# Patient Record
Sex: MALE | Race: BLACK OR AFRICAN AMERICAN | NOT HISPANIC OR LATINO
[De-identification: names, ages, dates, MRNs, and addresses within clinical notes are randomized per-mention and may not be internally consistent; named-entity substitution may affect disease eponyms.]

---

## 2017-11-07 ENCOUNTER — RESULT REVIEW (OUTPATIENT)
Age: 52
End: 2017-11-07

## 2017-11-07 ENCOUNTER — OUTPATIENT (OUTPATIENT)
Dept: OUTPATIENT SERVICES | Facility: HOSPITAL | Age: 52
LOS: 1 days | Discharge: HOME | End: 2017-11-07
Payer: COMMERCIAL

## 2017-11-07 DIAGNOSIS — K92.0 HEMATEMESIS: ICD-10-CM

## 2017-11-07 PROCEDURE — 43239 EGD BIOPSY SINGLE/MULTIPLE: CPT

## 2017-11-07 PROCEDURE — 88312 SPECIAL STAINS GROUP 1: CPT | Mod: 26

## 2017-11-07 PROCEDURE — 88305 TISSUE EXAM BY PATHOLOGIST: CPT | Mod: 26

## 2017-11-09 LAB — SURGICAL PATHOLOGY FINAL REPORT - CH: SIGNIFICANT CHANGE UP

## 2018-04-24 ENCOUNTER — EMERGENCY (EMERGENCY)
Facility: HOSPITAL | Age: 53
LOS: 1 days | Discharge: HOME | End: 2018-04-24
Attending: EMERGENCY MEDICINE
Payer: COMMERCIAL

## 2018-04-24 VITALS
DIASTOLIC BLOOD PRESSURE: 80 MMHG | SYSTOLIC BLOOD PRESSURE: 133 MMHG | TEMPERATURE: 98 F | RESPIRATION RATE: 18 BRPM | OXYGEN SATURATION: 98 % | HEART RATE: 78 BPM

## 2018-04-24 VITALS
HEART RATE: 80 BPM | TEMPERATURE: 98 F | DIASTOLIC BLOOD PRESSURE: 84 MMHG | RESPIRATION RATE: 20 BRPM | SYSTOLIC BLOOD PRESSURE: 125 MMHG | OXYGEN SATURATION: 97 %

## 2018-04-24 LAB
ALBUMIN SERPL ELPH-MCNC: 4.5 G/DL — SIGNIFICANT CHANGE UP (ref 3.3–5)
ALP SERPL-CCNC: 112 U/L — SIGNIFICANT CHANGE UP (ref 40–120)
ALT FLD-CCNC: 76 U/L — HIGH (ref 10–45)
ANION GAP SERPL CALC-SCNC: 20 MMOL/L — HIGH (ref 5–17)
APTT BLD: 31.6 SEC — SIGNIFICANT CHANGE UP (ref 27.5–37.4)
AST SERPL-CCNC: 180 U/L — HIGH (ref 10–40)
BASOPHILS # BLD AUTO: 0.1 K/UL — SIGNIFICANT CHANGE UP (ref 0–0.2)
BASOPHILS NFR BLD AUTO: 1 % — SIGNIFICANT CHANGE UP (ref 0–2)
BILIRUB SERPL-MCNC: 0.8 MG/DL — SIGNIFICANT CHANGE UP (ref 0.2–1.2)
BUN SERPL-MCNC: 10 MG/DL — SIGNIFICANT CHANGE UP (ref 7–23)
CALCIUM SERPL-MCNC: 9.3 MG/DL — SIGNIFICANT CHANGE UP (ref 8.4–10.5)
CHLORIDE SERPL-SCNC: 96 MMOL/L — SIGNIFICANT CHANGE UP (ref 96–108)
CO2 SERPL-SCNC: 25 MMOL/L — SIGNIFICANT CHANGE UP (ref 22–31)
CREAT SERPL-MCNC: 0.88 MG/DL — SIGNIFICANT CHANGE UP (ref 0.5–1.3)
EOSINOPHIL # BLD AUTO: 0.1 K/UL — SIGNIFICANT CHANGE UP (ref 0–0.5)
EOSINOPHIL NFR BLD AUTO: 2 % — SIGNIFICANT CHANGE UP (ref 0–6)
GLUCOSE SERPL-MCNC: 93 MG/DL — SIGNIFICANT CHANGE UP (ref 70–99)
HCT VFR BLD CALC: 41.3 % — SIGNIFICANT CHANGE UP (ref 39–50)
HGB BLD-MCNC: 14.1 G/DL — SIGNIFICANT CHANGE UP (ref 13–17)
INR BLD: 1.05 RATIO — SIGNIFICANT CHANGE UP (ref 0.88–1.16)
LYMPHOCYTES # BLD AUTO: 1.3 K/UL — SIGNIFICANT CHANGE UP (ref 1–3.3)
LYMPHOCYTES # BLD AUTO: 41 % — SIGNIFICANT CHANGE UP (ref 13–44)
MCHC RBC-ENTMCNC: 34.1 GM/DL — SIGNIFICANT CHANGE UP (ref 32–36)
MCHC RBC-ENTMCNC: 38.1 PG — HIGH (ref 27–34)
MCV RBC AUTO: 112 FL — HIGH (ref 80–100)
MONOCYTES # BLD AUTO: 0.3 K/UL — SIGNIFICANT CHANGE UP (ref 0–0.9)
MONOCYTES NFR BLD AUTO: 9 % — SIGNIFICANT CHANGE UP (ref 2–14)
NEUTROPHILS # BLD AUTO: 1.3 K/UL — LOW (ref 1.8–7.4)
NEUTROPHILS NFR BLD AUTO: 47 % — SIGNIFICANT CHANGE UP (ref 43–77)
PLATELET # BLD AUTO: 196 K/UL — SIGNIFICANT CHANGE UP (ref 150–400)
POTASSIUM SERPL-MCNC: 5.2 MMOL/L — SIGNIFICANT CHANGE UP (ref 3.5–5.3)
POTASSIUM SERPL-SCNC: 5.2 MMOL/L — SIGNIFICANT CHANGE UP (ref 3.5–5.3)
PROT SERPL-MCNC: 8.2 G/DL — SIGNIFICANT CHANGE UP (ref 6–8.3)
PROTHROM AB SERPL-ACNC: 11.3 SEC — SIGNIFICANT CHANGE UP (ref 9.8–12.7)
RBC # BLD: 3.7 M/UL — LOW (ref 4.2–5.8)
RBC # FLD: 11.9 % — SIGNIFICANT CHANGE UP (ref 10.3–14.5)
SODIUM SERPL-SCNC: 141 MMOL/L — SIGNIFICANT CHANGE UP (ref 135–145)
TROPONIN T SERPL-MCNC: <0.01 NG/ML — SIGNIFICANT CHANGE UP (ref 0–0.06)
TROPONIN T SERPL-MCNC: <0.01 NG/ML — SIGNIFICANT CHANGE UP (ref 0–0.06)
TSH SERPL-MCNC: 24.58 UIU/ML — HIGH (ref 0.27–4.2)
WBC # BLD: 3.1 K/UL — LOW (ref 3.8–10.5)
WBC # FLD AUTO: 3.1 K/UL — LOW (ref 3.8–10.5)

## 2018-04-24 PROCEDURE — 85730 THROMBOPLASTIN TIME PARTIAL: CPT

## 2018-04-24 PROCEDURE — 84484 ASSAY OF TROPONIN QUANT: CPT

## 2018-04-24 PROCEDURE — 71046 X-RAY EXAM CHEST 2 VIEWS: CPT | Mod: 26

## 2018-04-24 PROCEDURE — 80053 COMPREHEN METABOLIC PANEL: CPT

## 2018-04-24 PROCEDURE — 99284 EMERGENCY DEPT VISIT MOD MDM: CPT | Mod: 25

## 2018-04-24 PROCEDURE — 96374 THER/PROPH/DIAG INJ IV PUSH: CPT

## 2018-04-24 PROCEDURE — 99285 EMERGENCY DEPT VISIT HI MDM: CPT | Mod: 25

## 2018-04-24 PROCEDURE — 93005 ELECTROCARDIOGRAM TRACING: CPT

## 2018-04-24 PROCEDURE — 85027 COMPLETE CBC AUTOMATED: CPT

## 2018-04-24 PROCEDURE — 71046 X-RAY EXAM CHEST 2 VIEWS: CPT

## 2018-04-24 PROCEDURE — 96375 TX/PRO/DX INJ NEW DRUG ADDON: CPT

## 2018-04-24 PROCEDURE — 85610 PROTHROMBIN TIME: CPT

## 2018-04-24 PROCEDURE — 93010 ELECTROCARDIOGRAM REPORT: CPT

## 2018-04-24 PROCEDURE — 84443 ASSAY THYROID STIM HORMONE: CPT

## 2018-04-24 RX ORDER — FAMOTIDINE 10 MG/ML
20 INJECTION INTRAVENOUS ONCE
Qty: 0 | Refills: 0 | Status: COMPLETED | OUTPATIENT
Start: 2018-04-24 | End: 2018-04-24

## 2018-04-24 RX ORDER — ONDANSETRON 8 MG/1
4 TABLET, FILM COATED ORAL ONCE
Qty: 0 | Refills: 0 | Status: COMPLETED | OUTPATIENT
Start: 2018-04-24 | End: 2018-04-24

## 2018-04-24 RX ORDER — SODIUM CHLORIDE 9 MG/ML
1000 INJECTION INTRAMUSCULAR; INTRAVENOUS; SUBCUTANEOUS ONCE
Qty: 0 | Refills: 0 | Status: COMPLETED | OUTPATIENT
Start: 2018-04-24 | End: 2018-04-24

## 2018-04-24 RX ORDER — LIDOCAINE 4 G/100G
5 CREAM TOPICAL ONCE
Qty: 0 | Refills: 0 | Status: COMPLETED | OUTPATIENT
Start: 2018-04-24 | End: 2018-04-24

## 2018-04-24 RX ORDER — SUCRALFATE 1 G
1 TABLET ORAL ONCE
Qty: 0 | Refills: 0 | Status: COMPLETED | OUTPATIENT
Start: 2018-04-24 | End: 2018-04-24

## 2018-04-24 RX ADMIN — ONDANSETRON 4 MILLIGRAM(S): 8 TABLET, FILM COATED ORAL at 05:34

## 2018-04-24 RX ADMIN — Medication 30 MILLILITER(S): at 05:34

## 2018-04-24 RX ADMIN — FAMOTIDINE 20 MILLIGRAM(S): 10 INJECTION INTRAVENOUS at 05:34

## 2018-04-24 RX ADMIN — Medication 1 GRAM(S): at 05:34

## 2018-04-24 RX ADMIN — LIDOCAINE 5 MILLILITER(S): 4 CREAM TOPICAL at 05:34

## 2018-04-24 RX ADMIN — SODIUM CHLORIDE 4000 MILLILITER(S): 9 INJECTION INTRAMUSCULAR; INTRAVENOUS; SUBCUTANEOUS at 05:35

## 2018-04-24 NOTE — ED ADULT NURSE NOTE - CHIEF COMPLAINT QUOTE
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

## 2018-04-24 NOTE — ED PROVIDER NOTE - PROGRESS NOTE DETAILS
Guera ARAIZA: Patient signed out by Dr. Castillo pending 2nd trop. 2nd troponin negative. Patient reassessed - given initial complains of vomiting/ hematemesis (intermittently for weeks on tx for ulcer, has GI doc), no melena - will try PO challenge and reassess. Labs discussed with patient. Pt was reassessed, tolerating PO, stable and ready to be discharged.

## 2018-04-24 NOTE — ED PROVIDER NOTE - PHYSICAL EXAMINATION
Gen: NAD, AOx3  Head: NCAT  HEENT: PERRL, oral mucosa moist, normal conjunctiva  Lung: CTAB, no respiratory distress  CV: rrr, no murmurs, Normal perfusion  Abd: soft, epigastric tenderness no guarding no rebound, no CVA tenderness  MSK: No edema, no visible deformities  Neuro: No focal neurologic deficits  Skin: No rash   Psych: normal affect

## 2018-04-24 NOTE — ED ADULT TRIAGE NOTE - CHIEF COMPLAINT QUOTE
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

## 2018-04-24 NOTE — ED PROVIDER NOTE - OBJECTIVE STATEMENT
Patient is 52 y M with PMH thyroid presenting with hematemesis and decreased PO intake x 3 months. States that when he eats or drinks he often vomits dark red vomitus without coffee ground appearance. Also has "belching/acid" pain in chest rad to back, had upper endoscopy 10/2018 and was h pylori positive, discomfort now interfering with sleep.  Followed by GI and heme, currently on week 1 of triple therapy for h pylori. Has lost 10-15 lbs in a few months. Fam hx of heart disease in mother, ca in father.     PMD/GI: Reginaldo  Heme: Feldhammer?  ROS: Denies fever, palpitations, chills, recent sickness, HA, vision changes, cough, SOB, abdominal pain, dysuria, hematuria, rash, new joint aches, sick contacts, and recent travel.

## 2018-04-24 NOTE — ED ADULT NURSE NOTE - OBJECTIVE STATEMENT
Received patient awake and alert x 4, presenting to the ED with hematemesis and decreased PO in stake for the past 3 months. Patient states that when he eats and drinks he often vomits dark red emesis. Patient states he also has belching/acid type pain in the chest radiating to back, verbalized this pain woke him up from sleep. Denies any fever/chills, no SOB, no cough. Breathing unlabored with no S/S of distress noted, safety and comfort measures maintained, will continue to monitor.

## 2018-04-24 NOTE — ED PROVIDER NOTE - MEDICAL DECISION MAKING DETAILS
Attending MD Castillo: 52M with h/o hypothyroidism, gastritis pw chest and back pain, belching acute on chronic. Likely symptoms to known gastritis but given age will rule out ACS with serial Elli. GI cocktail and reassess

## 2018-06-12 ENCOUNTER — EMERGENCY (EMERGENCY)
Facility: HOSPITAL | Age: 53
LOS: 1 days | Discharge: HOME | End: 2018-06-12
Attending: EMERGENCY MEDICINE
Payer: COMMERCIAL

## 2018-06-12 VITALS
HEIGHT: 71 IN | DIASTOLIC BLOOD PRESSURE: 85 MMHG | RESPIRATION RATE: 16 BRPM | SYSTOLIC BLOOD PRESSURE: 121 MMHG | WEIGHT: 184.97 LBS | HEART RATE: 85 BPM | TEMPERATURE: 98 F | OXYGEN SATURATION: 97 %

## 2018-06-12 PROCEDURE — 99284 EMERGENCY DEPT VISIT MOD MDM: CPT

## 2018-06-12 PROCEDURE — 70450 CT HEAD/BRAIN W/O DYE: CPT

## 2018-06-12 PROCEDURE — 70450 CT HEAD/BRAIN W/O DYE: CPT | Mod: 26

## 2018-06-12 RX ORDER — ACETAMINOPHEN 500 MG
650 TABLET ORAL ONCE
Qty: 0 | Refills: 0 | Status: COMPLETED | OUTPATIENT
Start: 2018-06-12 | End: 2018-06-12

## 2018-06-12 RX ADMIN — Medication 650 MILLIGRAM(S): at 14:54

## 2018-06-12 NOTE — ED PROVIDER NOTE - PLAN OF CARE
1. Follow up with your primary care doctor in the next 1-2 days  2.Take Tylenol 1 g every 6 hours alternated with Motrin 400mg every 6 hours as needed for pain  3. For persistent symptoms follow up with the concussion center call [phone] to make an appointment   4. Return to the Emergency Room if you experience new or worsening symptoms
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
## 2018-06-12 NOTE — ED PROVIDER NOTE - PHYSICAL EXAMINATION
CONSTITUTIONAL: Patient is awake, alert and oriented x 3. Patient is well appearing and in no acute distress.  HEAD: NCAT,   EYES: PERRL b/l, EOMI,   ENT: Airway patent, Nasal mucosa clear. Mouth with normal mucosa. Throat has no vesicles, no oropharyngeal exudates and uvula is midline.  NECK: supple, No LAD,  LUNGS: CTA B/L, No ttp on palpation of chest   HEART: RRR.+S1S2 no murmurs,   ABDOMEN: Soft nd/nt+bs no rebound or guarding. No seat belt sign.   EXTREMITY: no edema or calf tenderness b/l, FROM upper and lower ext b/l  SKIN: with no rash or lesions.   NEURO: Cn3-12 grossly intact. Strength5/5UE/LE.NmlSensation.Gait normal. No pronator drift.

## 2018-06-12 NOTE — ED PROVIDER NOTE - CARE PLAN
Principal Discharge DX:	Concussion without loss of consciousness, initial encounter Principal Discharge DX:	Concussion without loss of consciousness, initial encounter  Assessment and plan of treatment:	1. Follow up with your primary care doctor in the next 1-2 days  2.Take Tylenol 1 g every 6 hours alternated with Motrin 400mg every 6 hours as needed for pain  3. For persistent symptoms follow up with the concussion center call [phone] to make an appointment   4. Return to the Emergency Room if you experience new or worsening symptoms

## 2018-06-21 NOTE — ED PROVIDER NOTE - ATTENDING CONTRIBUTION TO CARE
Attending MD Castillo:  I personally have seen and examined this patient.  Resident note reviewed and agree on plan of care and except where noted.  See HPI, PE, and MDM for details.       Attending MD Castillo: A & O x 3, NAD, EOMI b/l, PERRL b/l; lungs CTAB, heart with reg rhythm without murmur; abdomen soft NTND; extremities with no edema; affect appropriate. neuro exam non focal with no motor or sensory deficits. peripheral pulses full and equal in bilateral upper and lower extremities No

## 2018-06-29 ENCOUNTER — EMERGENCY (EMERGENCY)
Facility: HOSPITAL | Age: 53
LOS: 1 days | Discharge: HOME | End: 2018-06-29
Attending: EMERGENCY MEDICINE
Payer: COMMERCIAL

## 2018-06-29 VITALS
SYSTOLIC BLOOD PRESSURE: 137 MMHG | TEMPERATURE: 98 F | DIASTOLIC BLOOD PRESSURE: 87 MMHG | HEART RATE: 86 BPM | OXYGEN SATURATION: 95 % | RESPIRATION RATE: 16 BRPM

## 2018-06-29 VITALS
SYSTOLIC BLOOD PRESSURE: 136 MMHG | TEMPERATURE: 98 F | DIASTOLIC BLOOD PRESSURE: 88 MMHG | RESPIRATION RATE: 18 BRPM | HEART RATE: 93 BPM | OXYGEN SATURATION: 98 %

## 2018-06-29 DIAGNOSIS — M54.2 CERVICALGIA: ICD-10-CM

## 2018-06-29 DIAGNOSIS — R11.2 NAUSEA WITH VOMITING, UNSPECIFIED: ICD-10-CM

## 2018-06-29 DIAGNOSIS — R74.8 ABNORMAL LEVELS OF OTHER SERUM ENZYMES: ICD-10-CM
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
## 2018-06-29 DIAGNOSIS — R10.13 EPIGASTRIC PAIN: ICD-10-CM
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
## 2018-06-29 DIAGNOSIS — Z79.899 OTHER LONG TERM (CURRENT) DRUG THERAPY: ICD-10-CM
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
## 2018-06-29 DIAGNOSIS — E03.9 HYPOTHYROIDISM, UNSPECIFIED: ICD-10-CM
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
## 2018-06-29 LAB
ALBUMIN SERPL ELPH-MCNC: 4.4 G/DL — SIGNIFICANT CHANGE UP (ref 3.3–5)
ALP SERPL-CCNC: 106 U/L — SIGNIFICANT CHANGE UP (ref 40–120)
ALT FLD-CCNC: 62 U/L — HIGH (ref 10–45)
ANION GAP SERPL CALC-SCNC: 17 MMOL/L — SIGNIFICANT CHANGE UP (ref 5–17)
APTT BLD: 33.5 SEC — SIGNIFICANT CHANGE UP (ref 27.5–37.4)
AST SERPL-CCNC: 185 U/L — HIGH (ref 10–40)
BASOPHILS # BLD AUTO: 0 K/UL — SIGNIFICANT CHANGE UP (ref 0–0.2)
BILIRUB SERPL-MCNC: 0.7 MG/DL — SIGNIFICANT CHANGE UP (ref 0.2–1.2)
BUN SERPL-MCNC: 10 MG/DL — SIGNIFICANT CHANGE UP (ref 7–23)
CALCIUM SERPL-MCNC: 9.7 MG/DL — SIGNIFICANT CHANGE UP (ref 8.4–10.5)
CHLORIDE SERPL-SCNC: 99 MMOL/L — SIGNIFICANT CHANGE UP (ref 96–108)
CO2 SERPL-SCNC: 26 MMOL/L — SIGNIFICANT CHANGE UP (ref 22–31)
CREAT SERPL-MCNC: 0.82 MG/DL — SIGNIFICANT CHANGE UP (ref 0.5–1.3)
EOSINOPHIL # BLD AUTO: 0.1 K/UL — SIGNIFICANT CHANGE UP (ref 0–0.5)
EOSINOPHIL NFR BLD AUTO: 2 % — SIGNIFICANT CHANGE UP (ref 0–6)
GLUCOSE SERPL-MCNC: 102 MG/DL — HIGH (ref 70–99)
HCT VFR BLD CALC: 40.8 % — SIGNIFICANT CHANGE UP (ref 39–50)
HGB BLD-MCNC: 14.2 G/DL — SIGNIFICANT CHANGE UP (ref 13–17)
INR BLD: 1.06 RATIO — SIGNIFICANT CHANGE UP (ref 0.88–1.16)
LIDOCAIN IGE QN: 99 U/L — HIGH (ref 7–60)
LYMPHOCYTES # BLD AUTO: 1.1 K/UL — SIGNIFICANT CHANGE UP (ref 1–3.3)
LYMPHOCYTES # BLD AUTO: 39 % — SIGNIFICANT CHANGE UP (ref 13–44)
MCHC RBC-ENTMCNC: 34.8 GM/DL — SIGNIFICANT CHANGE UP (ref 32–36)
MCHC RBC-ENTMCNC: 39.1 PG — HIGH (ref 27–34)
MCV RBC AUTO: 112 FL — HIGH (ref 80–100)
MONOCYTES # BLD AUTO: 0.2 K/UL — SIGNIFICANT CHANGE UP (ref 0–0.9)
MONOCYTES NFR BLD AUTO: 9 % — SIGNIFICANT CHANGE UP (ref 2–14)
NEUTROPHILS # BLD AUTO: 1 K/UL — LOW (ref 1.8–7.4)
NEUTROPHILS NFR BLD AUTO: 50 % — SIGNIFICANT CHANGE UP (ref 43–77)
PLATELET # BLD AUTO: 205 K/UL — SIGNIFICANT CHANGE UP (ref 150–400)
POTASSIUM SERPL-MCNC: 4.3 MMOL/L — SIGNIFICANT CHANGE UP (ref 3.5–5.3)
POTASSIUM SERPL-SCNC: 4.3 MMOL/L — SIGNIFICANT CHANGE UP (ref 3.5–5.3)
PROT SERPL-MCNC: 7.9 G/DL — SIGNIFICANT CHANGE UP (ref 6–8.3)
PROTHROM AB SERPL-ACNC: 11.6 SEC — SIGNIFICANT CHANGE UP (ref 9.8–12.7)
RBC # BLD: 3.63 M/UL — LOW (ref 4.2–5.8)
RBC # FLD: 11.3 % — SIGNIFICANT CHANGE UP (ref 10.3–14.5)
SODIUM SERPL-SCNC: 142 MMOL/L — SIGNIFICANT CHANGE UP (ref 135–145)
WBC # BLD: 2.4 K/UL — LOW (ref 3.8–10.5)
WBC # FLD AUTO: 2.4 K/UL — LOW (ref 3.8–10.5)

## 2018-06-29 PROCEDURE — 71046 X-RAY EXAM CHEST 2 VIEWS: CPT | Mod: 26

## 2018-06-29 PROCEDURE — 85027 COMPLETE CBC AUTOMATED: CPT

## 2018-06-29 PROCEDURE — 96375 TX/PRO/DX INJ NEW DRUG ADDON: CPT

## 2018-06-29 PROCEDURE — 99053 MED SERV 10PM-8AM 24 HR FAC: CPT

## 2018-06-29 PROCEDURE — 96374 THER/PROPH/DIAG INJ IV PUSH: CPT

## 2018-06-29 PROCEDURE — 72125 CT NECK SPINE W/O DYE: CPT

## 2018-06-29 PROCEDURE — 85730 THROMBOPLASTIN TIME PARTIAL: CPT

## 2018-06-29 PROCEDURE — 99284 EMERGENCY DEPT VISIT MOD MDM: CPT | Mod: 25

## 2018-06-29 PROCEDURE — 72125 CT NECK SPINE W/O DYE: CPT | Mod: 26

## 2018-06-29 PROCEDURE — 80053 COMPREHEN METABOLIC PANEL: CPT

## 2018-06-29 PROCEDURE — 71046 X-RAY EXAM CHEST 2 VIEWS: CPT

## 2018-06-29 PROCEDURE — 85610 PROTHROMBIN TIME: CPT

## 2018-06-29 PROCEDURE — 83690 ASSAY OF LIPASE: CPT

## 2018-06-29 RX ORDER — FAMOTIDINE 10 MG/ML
20 INJECTION INTRAVENOUS ONCE
Qty: 0 | Refills: 0 | Status: COMPLETED | OUTPATIENT
Start: 2018-06-29 | End: 2018-06-29

## 2018-06-29 RX ORDER — LIDOCAINE 4 G/100G
1 CREAM TOPICAL ONCE
Qty: 0 | Refills: 0 | Status: COMPLETED | OUTPATIENT
Start: 2018-06-29 | End: 2018-06-29

## 2018-06-29 RX ORDER — PANTOPRAZOLE SODIUM 20 MG/1
1 TABLET, DELAYED RELEASE ORAL
Qty: 14 | Refills: 0 | OUTPATIENT
Start: 2018-06-29 | End: 2018-07-12

## 2018-06-29 RX ORDER — METHOCARBAMOL 500 MG/1
2 TABLET, FILM COATED ORAL
Qty: 20 | Refills: 0 | OUTPATIENT
Start: 2018-06-29

## 2018-06-29 RX ORDER — ONDANSETRON 8 MG/1
4 TABLET, FILM COATED ORAL ONCE
Qty: 0 | Refills: 0 | Status: COMPLETED | OUTPATIENT
Start: 2018-06-29 | End: 2018-06-29

## 2018-06-29 RX ORDER — ACETAMINOPHEN 500 MG
1000 TABLET ORAL ONCE
Qty: 0 | Refills: 0 | Status: COMPLETED | OUTPATIENT
Start: 2018-06-29 | End: 2018-06-29

## 2018-06-29 RX ADMIN — FAMOTIDINE 20 MILLIGRAM(S): 10 INJECTION INTRAVENOUS at 02:34

## 2018-06-29 RX ADMIN — Medication 400 MILLIGRAM(S): at 02:34

## 2018-06-29 RX ADMIN — ONDANSETRON 4 MILLIGRAM(S): 8 TABLET, FILM COATED ORAL at 02:34

## 2018-06-29 RX ADMIN — LIDOCAINE 1 PATCH: 4 CREAM TOPICAL at 02:35

## 2018-06-29 RX ADMIN — Medication 30 MILLILITER(S): at 02:34
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
## 2018-06-29 NOTE — ED PROVIDER NOTE - CONSTITUTIONAL, MLM
separately recorded in the [facility] 

 


Mono % (normal... Well appearing, well nourished, awake, alert, oriented to person, place, time/situation and in no apparent distress.
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
## 2018-12-31 PROBLEM — E03.9 HYPOTHYROIDISM, UNSPECIFIED: Chronic | Status: ACTIVE | Noted: 2018-06-29

## 2019-01-15 ENCOUNTER — APPOINTMENT (OUTPATIENT)
Dept: PULMONOLOGY | Facility: CLINIC | Age: 54
End: 2019-01-15

## 2019-02-19 ENCOUNTER — APPOINTMENT (OUTPATIENT)
Dept: PULMONOLOGY | Facility: CLINIC | Age: 54
End: 2019-02-19
Payer: COMMERCIAL

## 2019-02-19 VITALS
HEIGHT: 71 IN | SYSTOLIC BLOOD PRESSURE: 147 MMHG | TEMPERATURE: 98.4 F | DIASTOLIC BLOOD PRESSURE: 90 MMHG | WEIGHT: 170 LBS | OXYGEN SATURATION: 99 % | BODY MASS INDEX: 23.8 KG/M2 | HEART RATE: 67 BPM

## 2019-02-19 PROCEDURE — 99203 OFFICE O/P NEW LOW 30 MIN: CPT | Mod: 25

## 2019-02-19 PROCEDURE — 94729 DIFFUSING CAPACITY: CPT

## 2019-02-19 PROCEDURE — 94726 PLETHYSMOGRAPHY LUNG VOLUMES: CPT

## 2019-02-19 PROCEDURE — ZZZZZ: CPT

## 2019-02-19 PROCEDURE — 94010 BREATHING CAPACITY TEST: CPT

## 2019-02-19 NOTE — PROCEDURE
[FreeTextEntry1] : CXR- 6/18- no acute infiltrates. Reviewed personally\par \par Spirometry from [facility] records -12/10/18- normal\par \par CXR report only 12/10/18- Normal\par \par PFTs today- Spirometry is normal. Lung volumes are normal. DLCO is normal.

## 2019-02-19 NOTE — ASSESSMENT
[FreeTextEntry1] : 1) Dyspnea- possibly from GERD vs nasal congestion. DDx- Diaphragmatic weakness, CHF. He has no functional limitations. Symptoms have only been on lying down flat and are intermittent.

## 2019-04-20 ENCOUNTER — INPATIENT (INPATIENT)
Facility: HOSPITAL | Age: 54
LOS: 4 days | Discharge: HOME | DRG: 438 | End: 2019-04-25
Attending: INTERNAL MEDICINE | Admitting: INTERNAL MEDICINE
Payer: COMMERCIAL

## 2019-04-20 VITALS
RESPIRATION RATE: 18 BRPM | DIASTOLIC BLOOD PRESSURE: 87 MMHG | TEMPERATURE: 98 F | OXYGEN SATURATION: 99 % | WEIGHT: 182.98 LBS | SYSTOLIC BLOOD PRESSURE: 145 MMHG | HEART RATE: 128 BPM | HEIGHT: 71 IN

## 2019-04-20 DIAGNOSIS — N17.9 ACUTE KIDNEY FAILURE, UNSPECIFIED: ICD-10-CM

## 2019-04-20 DIAGNOSIS — E03.9 HYPOTHYROIDISM, UNSPECIFIED: ICD-10-CM

## 2019-04-20 DIAGNOSIS — K85.90 ACUTE PANCREATITIS WITHOUT NECROSIS OR INFECTION, UNSPECIFIED: ICD-10-CM

## 2019-04-20 DIAGNOSIS — K85.01 IDIOPATHIC ACUTE PANCREATITIS WITH UNINFECTED NECROSIS: ICD-10-CM

## 2019-04-20 DIAGNOSIS — F10.10 ALCOHOL ABUSE, UNCOMPLICATED: ICD-10-CM

## 2019-04-20 DIAGNOSIS — K21.9 GASTRO-ESOPHAGEAL REFLUX DISEASE WITHOUT ESOPHAGITIS: ICD-10-CM

## 2019-04-20 DIAGNOSIS — R94.5 ABNORMAL RESULTS OF LIVER FUNCTION STUDIES: ICD-10-CM

## 2019-04-20 LAB
ALBUMIN SERPL ELPH-MCNC: 5 G/DL — SIGNIFICANT CHANGE UP (ref 3.3–5)
ALP SERPL-CCNC: 151 U/L — HIGH (ref 40–120)
ALT FLD-CCNC: 47 U/L — HIGH (ref 10–45)
ANION GAP SERPL CALC-SCNC: 24 MMOL/L — HIGH (ref 5–17)
APTT BLD: 30.9 SEC — SIGNIFICANT CHANGE UP (ref 27.5–36.3)
AST SERPL-CCNC: 130 U/L — HIGH (ref 10–40)
BASE EXCESS BLDV CALC-SCNC: -3.1 MMOL/L — LOW (ref -2–2)
BASE EXCESS BLDV CALC-SCNC: 0.2 MMOL/L — SIGNIFICANT CHANGE UP (ref -2–2)
BASOPHILS # BLD AUTO: 0 K/UL — SIGNIFICANT CHANGE UP (ref 0–0.2)
BASOPHILS NFR BLD AUTO: 0 % — SIGNIFICANT CHANGE UP (ref 0–2)
BILIRUB DIRECT SERPL-MCNC: 0.5 MG/DL — HIGH (ref 0–0.2)
BILIRUB INDIRECT FLD-MCNC: 1.4 MG/DL — HIGH (ref 0.2–1)
BILIRUB SERPL-MCNC: 1.9 MG/DL — HIGH (ref 0.2–1.2)
BILIRUB SERPL-MCNC: 1.9 MG/DL — HIGH (ref 0.2–1.2)
BUN SERPL-MCNC: 9 MG/DL — SIGNIFICANT CHANGE UP (ref 7–23)
CA-I SERPL-SCNC: 1.05 MMOL/L — LOW (ref 1.12–1.3)
CA-I SERPL-SCNC: 1.13 MMOL/L — SIGNIFICANT CHANGE UP (ref 1.12–1.3)
CALCIUM SERPL-MCNC: 10.1 MG/DL — SIGNIFICANT CHANGE UP (ref 8.4–10.5)
CHLORIDE BLDV-SCNC: 104 MMOL/L — SIGNIFICANT CHANGE UP (ref 96–108)
CHLORIDE BLDV-SCNC: 104 MMOL/L — SIGNIFICANT CHANGE UP (ref 96–108)
CHLORIDE SERPL-SCNC: 95 MMOL/L — LOW (ref 96–108)
CO2 BLDV-SCNC: 22 MMOL/L — SIGNIFICANT CHANGE UP (ref 22–30)
CO2 BLDV-SCNC: 27 MMOL/L — SIGNIFICANT CHANGE UP (ref 22–30)
CO2 SERPL-SCNC: 21 MMOL/L — LOW (ref 22–31)
CREAT SERPL-MCNC: 1.34 MG/DL — HIGH (ref 0.5–1.3)
EOSINOPHIL # BLD AUTO: 0 K/UL — SIGNIFICANT CHANGE UP (ref 0–0.5)
EOSINOPHIL NFR BLD AUTO: 0.1 % — SIGNIFICANT CHANGE UP (ref 0–6)
GAS PNL BLDV: 137 MMOL/L — SIGNIFICANT CHANGE UP (ref 136–145)
GAS PNL BLDV: 140 MMOL/L — SIGNIFICANT CHANGE UP (ref 136–145)
GAS PNL BLDV: SIGNIFICANT CHANGE UP
GLUCOSE BLDV-MCNC: 115 MG/DL — HIGH (ref 70–99)
GLUCOSE BLDV-MCNC: 92 MG/DL — SIGNIFICANT CHANGE UP (ref 70–99)
GLUCOSE SERPL-MCNC: 117 MG/DL — HIGH (ref 70–99)
HAV IGM SER-ACNC: SIGNIFICANT CHANGE UP
HBV CORE IGM SER-ACNC: SIGNIFICANT CHANGE UP
HBV SURFACE AG SER-ACNC: SIGNIFICANT CHANGE UP
HCO3 BLDV-SCNC: 21 MMOL/L — SIGNIFICANT CHANGE UP (ref 21–29)
HCO3 BLDV-SCNC: 25 MMOL/L — SIGNIFICANT CHANGE UP (ref 21–29)
HCT VFR BLD CALC: 46.4 % — SIGNIFICANT CHANGE UP (ref 39–50)
HCT VFR BLDA CALC: 42 % — SIGNIFICANT CHANGE UP (ref 39–50)
HCT VFR BLDA CALC: 43 % — SIGNIFICANT CHANGE UP (ref 39–50)
HCV AB S/CO SERPL IA: 0.11 S/CO — SIGNIFICANT CHANGE UP (ref 0–0.99)
HCV AB SERPL-IMP: SIGNIFICANT CHANGE UP
HGB BLD CALC-MCNC: 13.8 G/DL — SIGNIFICANT CHANGE UP (ref 13–17)
HGB BLD CALC-MCNC: 13.9 G/DL — SIGNIFICANT CHANGE UP (ref 13–17)
HGB BLD-MCNC: 15.7 G/DL — SIGNIFICANT CHANGE UP (ref 13–17)
INR BLD: 1.27 RATIO — HIGH (ref 0.88–1.16)
LACTATE BLDV-MCNC: 2.7 MMOL/L — HIGH (ref 0.7–2)
LACTATE BLDV-MCNC: 5.7 MMOL/L — CRITICAL HIGH (ref 0.7–2)
LIDOCAIN IGE QN: >530 U/L — HIGH (ref 7–60)
LYMPHOCYTES # BLD AUTO: 0.7 K/UL — LOW (ref 1–3.3)
LYMPHOCYTES # BLD AUTO: 5.6 % — LOW (ref 13–44)
MCHC RBC-ENTMCNC: 33.8 GM/DL — SIGNIFICANT CHANGE UP (ref 32–36)
MCHC RBC-ENTMCNC: 38.3 PG — HIGH (ref 27–34)
MCV RBC AUTO: 113 FL — HIGH (ref 80–100)
MONOCYTES # BLD AUTO: 1 K/UL — HIGH (ref 0–0.9)
MONOCYTES NFR BLD AUTO: 8.2 % — SIGNIFICANT CHANGE UP (ref 2–14)
NEUTROPHILS # BLD AUTO: 11 K/UL — HIGH (ref 1.8–7.4)
NEUTROPHILS NFR BLD AUTO: 86.1 % — HIGH (ref 43–77)
PCO2 BLDV: 34 MMHG — LOW (ref 35–50)
PCO2 BLDV: 45 MMHG — SIGNIFICANT CHANGE UP (ref 35–50)
PH BLDV: 7.37 — SIGNIFICANT CHANGE UP (ref 7.35–7.45)
PH BLDV: 7.4 — SIGNIFICANT CHANGE UP (ref 7.35–7.45)
PLATELET # BLD AUTO: 318 K/UL — SIGNIFICANT CHANGE UP (ref 150–400)
PO2 BLDV: 38 MMHG — SIGNIFICANT CHANGE UP (ref 25–45)
PO2 BLDV: 50 MMHG — HIGH (ref 25–45)
POTASSIUM BLDV-SCNC: 5 MMOL/L — SIGNIFICANT CHANGE UP (ref 3.5–5.3)
POTASSIUM BLDV-SCNC: 7.4 MMOL/L — CRITICAL HIGH (ref 3.5–5.3)
POTASSIUM SERPL-MCNC: 4.9 MMOL/L — SIGNIFICANT CHANGE UP (ref 3.5–5.3)
POTASSIUM SERPL-SCNC: 4.9 MMOL/L — SIGNIFICANT CHANGE UP (ref 3.5–5.3)
PROT SERPL-MCNC: 8.7 G/DL — HIGH (ref 6–8.3)
PROTHROM AB SERPL-ACNC: 14.7 SEC — HIGH (ref 10–12.9)
RBC # BLD: 4.1 M/UL — LOW (ref 4.2–5.8)
RBC # FLD: 12.2 % — SIGNIFICANT CHANGE UP (ref 10.3–14.5)
SAO2 % BLDV: 68 % — SIGNIFICANT CHANGE UP (ref 67–88)
SAO2 % BLDV: 84 % — SIGNIFICANT CHANGE UP (ref 67–88)
SODIUM SERPL-SCNC: 140 MMOL/L — SIGNIFICANT CHANGE UP (ref 135–145)
WBC # BLD: 12.8 K/UL — HIGH (ref 3.8–10.5)
WBC # FLD AUTO: 12.8 K/UL — HIGH (ref 3.8–10.5)

## 2019-04-20 PROCEDURE — 93010 ELECTROCARDIOGRAM REPORT: CPT

## 2019-04-20 PROCEDURE — 76705 ECHO EXAM OF ABDOMEN: CPT | Mod: 26,RT

## 2019-04-20 PROCEDURE — 99285 EMERGENCY DEPT VISIT HI MDM: CPT | Mod: 25

## 2019-04-20 PROCEDURE — 99221 1ST HOSP IP/OBS SF/LOW 40: CPT

## 2019-04-20 PROCEDURE — 74177 CT ABD & PELVIS W/CONTRAST: CPT | Mod: 26

## 2019-04-20 RX ORDER — MORPHINE SULFATE 50 MG/1
2 CAPSULE, EXTENDED RELEASE ORAL EVERY 4 HOURS
Qty: 0 | Refills: 0 | Status: DISCONTINUED | OUTPATIENT
Start: 2019-04-20 | End: 2019-04-25

## 2019-04-20 RX ORDER — PIPERACILLIN AND TAZOBACTAM 4; .5 G/20ML; G/20ML
3.38 INJECTION, POWDER, LYOPHILIZED, FOR SOLUTION INTRAVENOUS ONCE
Qty: 0 | Refills: 0 | Status: COMPLETED | OUTPATIENT
Start: 2019-04-20 | End: 2019-04-20

## 2019-04-20 RX ORDER — MORPHINE SULFATE 50 MG/1
4 CAPSULE, EXTENDED RELEASE ORAL ONCE
Qty: 0 | Refills: 0 | Status: DISCONTINUED | OUTPATIENT
Start: 2019-04-20 | End: 2019-04-20

## 2019-04-20 RX ORDER — PANTOPRAZOLE SODIUM 20 MG/1
40 TABLET, DELAYED RELEASE ORAL DAILY
Qty: 0 | Refills: 0 | Status: DISCONTINUED | OUTPATIENT
Start: 2019-04-20 | End: 2019-04-25

## 2019-04-20 RX ORDER — SODIUM CHLORIDE 9 MG/ML
1000 INJECTION INTRAMUSCULAR; INTRAVENOUS; SUBCUTANEOUS ONCE
Qty: 0 | Refills: 0 | Status: COMPLETED | OUTPATIENT
Start: 2019-04-20 | End: 2019-04-20

## 2019-04-20 RX ORDER — SODIUM CHLORIDE 9 MG/ML
2000 INJECTION INTRAMUSCULAR; INTRAVENOUS; SUBCUTANEOUS ONCE
Qty: 0 | Refills: 0 | Status: COMPLETED | OUTPATIENT
Start: 2019-04-20 | End: 2019-04-20

## 2019-04-20 RX ORDER — INFLUENZA VIRUS VACCINE 15; 15; 15; 15 UG/.5ML; UG/.5ML; UG/.5ML; UG/.5ML
0.5 SUSPENSION INTRAMUSCULAR ONCE
Qty: 0 | Refills: 0 | Status: DISCONTINUED | OUTPATIENT
Start: 2019-04-20 | End: 2019-04-25

## 2019-04-20 RX ORDER — SODIUM CHLORIDE 9 MG/ML
1000 INJECTION INTRAMUSCULAR; INTRAVENOUS; SUBCUTANEOUS
Qty: 0 | Refills: 0 | Status: DISCONTINUED | OUTPATIENT
Start: 2019-04-20 | End: 2019-04-22

## 2019-04-20 RX ORDER — ONDANSETRON 8 MG/1
4 TABLET, FILM COATED ORAL ONCE
Qty: 0 | Refills: 0 | Status: COMPLETED | OUTPATIENT
Start: 2019-04-20 | End: 2019-04-20

## 2019-04-20 RX ORDER — ENOXAPARIN SODIUM 100 MG/ML
40 INJECTION SUBCUTANEOUS DAILY
Qty: 0 | Refills: 0 | Status: DISCONTINUED | OUTPATIENT
Start: 2019-04-20 | End: 2019-04-23

## 2019-04-20 RX ADMIN — ONDANSETRON 4 MILLIGRAM(S): 8 TABLET, FILM COATED ORAL at 12:46

## 2019-04-20 RX ADMIN — PANTOPRAZOLE SODIUM 40 MILLIGRAM(S): 20 TABLET, DELAYED RELEASE ORAL at 19:49

## 2019-04-20 RX ADMIN — SODIUM CHLORIDE 2000 MILLILITER(S): 9 INJECTION INTRAMUSCULAR; INTRAVENOUS; SUBCUTANEOUS at 12:47

## 2019-04-20 RX ADMIN — ENOXAPARIN SODIUM 40 MILLIGRAM(S): 100 INJECTION SUBCUTANEOUS at 23:04

## 2019-04-20 RX ADMIN — ONDANSETRON 4 MILLIGRAM(S): 8 TABLET, FILM COATED ORAL at 13:20

## 2019-04-20 RX ADMIN — SODIUM CHLORIDE 150 MILLILITER(S): 9 INJECTION INTRAMUSCULAR; INTRAVENOUS; SUBCUTANEOUS at 19:49

## 2019-04-20 RX ADMIN — MORPHINE SULFATE 4 MILLIGRAM(S): 50 CAPSULE, EXTENDED RELEASE ORAL at 18:30

## 2019-04-20 RX ADMIN — SODIUM CHLORIDE 1000 MILLILITER(S): 9 INJECTION INTRAMUSCULAR; INTRAVENOUS; SUBCUTANEOUS at 16:25

## 2019-04-20 RX ADMIN — PIPERACILLIN AND TAZOBACTAM 200 GRAM(S): 4; .5 INJECTION, POWDER, LYOPHILIZED, FOR SOLUTION INTRAVENOUS at 13:25

## 2019-04-20 RX ADMIN — MORPHINE SULFATE 4 MILLIGRAM(S): 50 CAPSULE, EXTENDED RELEASE ORAL at 14:09

## 2019-04-20 RX ADMIN — MORPHINE SULFATE 4 MILLIGRAM(S): 50 CAPSULE, EXTENDED RELEASE ORAL at 18:00

## 2019-04-20 RX ADMIN — MORPHINE SULFATE 4 MILLIGRAM(S): 50 CAPSULE, EXTENDED RELEASE ORAL at 13:41

## 2019-04-20 NOTE — ED ADULT NURSE NOTE - OBJECTIVE STATEMENT
53 yr old male to ED A&Ox3 presents with +abd discomfort, nausea, and vomiting since Wednesday. Pt reports last BM was Wednesday. Pt reports that he unable to tolerate PO intake including Gatorade and water.  +weakness. No chest pain or SOB. Denies any fevers or chills.  No acure resp distress noted. Resp even and unlabored. +guarding noted with palpation of abd. abd ND. +BS. +PP. YBARRA. Skin warm, dry and intact. 20 G IV placed to RT FA. Bloods obtained and sent. Will continue to monitor.
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
## 2019-04-20 NOTE — CONSULT NOTE ADULT - ATTENDING COMMENTS
seen and examined 04-20-19 @ 1855    afeb  AVSS  belching  soft / mild epigastric tenderness with guarding / ND  no jaundice    WBC = 13  hgb = 15.7  MCV = 113  Cr = 1.3 (baseline = 0.8)  Mg = 1.4  D/T bili = 0.5 / 1.9  AST = 130  ALT = 47  lipase > 530    U/S abd (4/20/2019) - no gallstones, CBD 4 mm  CT abd/pelvis (4/20/2019) - enlarged liver with steatosis, inflammation of pancreas, 1.5 cm hypodensity in uncinate, proximal duodenal diverticulum which was present on 9/8/2009 CT abd/pelvis.      alcoholic pancreatitis  -The patient's family gives a history of significant alcohol abuse, although the patient is in denial. His AST>ALT, high MCV and hepatic steatosis also suggest significant alcohol abuse.  -I recommended against consuming alcohol in the future. I warned the patient that alcoholic pancreatitis will likely recur if he continues to drink and that any episode of this disease could be fatal.  -supportive care and advance diet as tolerated  -maintenance IVF when NPO    hypodense pancreatic lesion  -f/u with his PMD or GI for repeat imaging in 6 weeks to assure this resolves or is benign seen and examined 04-20-19 @ 1855    afeb  AVSS  belching  soft / mild epigastric tenderness with guarding / ND  no jaundice    WBC = 13  hgb = 15.7  MCV = 113  Cr = 1.3 (baseline = 0.8)  Mg = 1.4  D/T bili = 0.5 / 1.9  AST = 130  ALT = 47  lipase > 530    U/S abd (4/20/2019) - no gallstones, CBD 4 mm  CT abd/pelvis (4/20/2019) - enlarged liver with steatosis, inflammation of pancreas, 1.5 cm hypodensity in uncinate, proximal duodenal diverticulum which was present on 9/8/2009 CT abd/pelvis.      alcoholic pancreatitis  -The patient's family gives a history of significant alcohol abuse, although the patient is in denial. His AST>ALT, high MCV and hepatic steatosis also suggest significant alcohol abuse.  -I recommended against consuming alcohol in the future. I warned the patient that alcoholic pancreatitis will likely recur if he continues to drink and that any episode of this disease could be fatal.  -supportive care and advance diet as tolerated  -maintenance IVF when NPO    hypodense pancreatic lesion  -f/u with his PMD or GI for repeat imaging in 6 weeks to assure this resolves or is benign    hepatic steatosis raises concern for alcoholic cirrhosis  -outpatient hepatology referral

## 2019-04-20 NOTE — H&P ADULT - RS GEN PE MLT RESP DETAILS PC
normal/respirations non-labored/clear to auscultation bilaterally/airway patent/breath sounds equal/good air movement

## 2019-04-20 NOTE — ED PROVIDER NOTE - SHIFT CHANGE DETAILS
Attending MD Quarles: Pancreatitis, with area of necrosis on CT, initial Lac 5.7, down trending after IVFs but still elevated 3.5.  Pending sx consult.  Will await.  Will need admission.

## 2019-04-20 NOTE — H&P ADULT - PROBLEM SELECTOR PLAN 1
Hemodynamically stable. IVF,NPO ,Pain meds and holding Abxs as no fever . Likely cause Alcohol . CT scan and US noted.

## 2019-04-20 NOTE — ED PROVIDER NOTE - ATTENDING CONTRIBUTION TO CARE
attending Dk: 53yM h/o GI ulcer and ? hiatal hernia p/w 4 days N/V, not tolerating PO. Epigastric pain. No prior abdominal surgeries. On exam, tachycardic, ill appearing, dry MM, abdomen soft with +guarding, tenderness mid abdomen without rebound or rigidity. Will obtain labs including lipase/lytes, IVF, pain/nausea control, CT A/P and reassess

## 2019-04-20 NOTE — CONSULT NOTE ADULT - ASSESSMENT
54 yo man with a hx of GERD presents to the ED with epigastric pain, N/V, concerning for pancreatitis. Tachycardia and lactic acidemia improved with 3L IVF. Mild epigastric TTP on exam. CT reveals pancreatitis with a 1.3 cm hypodense lesion in the pancreatitic head, possibly necrotic.    - no urgent surgical intervention  - recommend internal medicine evaluation for continued management of pancreatitis, pain control  - may switch patient to maintenance IVF at this time  - no indication of antibiotics The pancreatic necrosis does not appear infected, and the leukocytosis if likely reactive  - surgery will cont to follow    [facility]  [id number] 54 yo man with a hx of GERD presents to the ED with epigastric pain, N/V, concerning for pancreatitis. Tachycardia and lactic acidemia improved with 3L IVF. Mild epigastric TTP on exam. CT reveals pancreatitis with a 1.3 cm hypodense lesion in the pancreatitic head, possibly necrotic.    - no urgent surgical intervention  - recommend internal medicine evaluation for continued management of pancreatitis, pain control  - may switch patient to maintenance IVF at this time  - no indication of antibiotics The pancreatic necrosis does not appear infected, and the leukocytosis is likely reactive  - surgery will cont to follow    [facility]  [id number]

## 2019-04-20 NOTE — CONSULT NOTE ADULT - SUBJECTIVE AND OBJECTIVE BOX
Consulting surgical team: [facility], [id number]  Consulting attending: Dr. Magnsu Morejon    HPI:  54 yo man with a hx of GERD presents to the ED due to epigastric abdominal pain with associated nausea and vomiting x4 days. He is also complaining of hiccups. The patient reports he was recently in [place] where he was drinking daily. He denies fever, chills, diarrhea. He reports a colonoscopy within the past few years that was normal.    ED Course: afebrile, tachycardic to 128. Lactate initially 5.7. Received 3L IVF, zosyn, and pain control. HR decreased to 109, lactate improved to 3.5. Mild epigastric TTP on exam. CT reveals pancreatitis with a 1.3 cm hypodense lesion in the pancreatitic head, possibly necrotic.      PAST MEDICAL HISTORY:  Hernia  Hypothyroid      PAST SURGICAL HISTORY:  Right knee meniscal repair    MEDICATIONS:  Protonix    ALLERGIES:  No Known Allergies      VITALS & I/Os:  Vital Signs Last 24 Hrs  T(C): 36.9 (20 Apr 2019 13:28), Max: 36.9 (20 Apr 2019 13:28)  T(F): 98.5 (20 Apr 2019 13:28), Max: 98.5 (20 Apr 2019 13:28)  HR: 109 (20 Apr 2019 13:28) (109 - 128)  BP: 164/98 (20 Apr 2019 13:28) (142/97 - 164/98)  BP(mean): --  RR: 19 (20 Apr 2019 13:28) (16 - 19)  SpO2: 100% (20 Apr 2019 13:28) (98% - 100%)      PHYSICAL EXAM:  GEN: NAD, resting quietly  PULM: symmetric chest rise bilaterally, no increased WOB  CV: regular rate, peripheral pulses intact  ABD: soft, non-distended, mild epigastric tenderness, no rebound, no guarding, no peritoneal signs  EXTR: no cyanosis or edema, moving all extremities    LABS:                        15.7   12.8  )-----------( 318      ( 20 Apr 2019 12:37 )             46.4     04-20    140  |  95<L>  |  9   ----------------------------<  117<H>  4.9   |  21<L>  |  1.34<H>    Ca    10.1      20 Apr 2019 12:37  Phos  2.6     04-20  Mg     1.4     04-20    TPro  8.7<H>  /  Alb  5.0  /  TBili  1.9<H>  /  DBili  0.5<H>  /  AST  130<H>  /  ALT  47<H>  /  AlkPhos  151<H>  04-20    Lactate:  04-20 @ 15:18  3.5  04-20 @ 12:37  5.7    PT/INR - ( 20 Apr 2019 12:37 )   PT: 14.7 sec;   INR: 1.27 ratio         PTT - ( 20 Apr 2019 12:37 )  PTT:30.9 sec      IMAGING:  < from: CT Abdomen and Pelvis w/ IV Cont (04.20.19 @ 15:43) >  IMPRESSION:     Acute pancreatitis with 1.3 cm hypodense lesion in the pancreatic   head/uncinate process which may represent focus of necrosis. Follow-up is   required.    A hypodensity measuring 1.3 x 1.3 x 1.1 cm in the distal stomach/proximal   duodenum which may represent aduodenal diverticulum versus an ulcer.    Hepatomegaly and diffuse hepatic steatosis..    < end of copied text >

## 2019-04-21 LAB
ALBUMIN SERPL ELPH-MCNC: 3.6 G/DL — SIGNIFICANT CHANGE UP (ref 3.3–5)
ALP SERPL-CCNC: 97 U/L — SIGNIFICANT CHANGE UP (ref 40–120)
ALT FLD-CCNC: 28 U/L — SIGNIFICANT CHANGE UP (ref 10–45)
AMYLASE P1 CFR SERPL: 151 U/L — HIGH (ref 25–125)
ANION GAP SERPL CALC-SCNC: 14 MMOL/L — SIGNIFICANT CHANGE UP (ref 5–17)
AST SERPL-CCNC: 76 U/L — HIGH (ref 10–40)
BILIRUB SERPL-MCNC: 1.2 MG/DL — SIGNIFICANT CHANGE UP (ref 0.2–1.2)
BUN SERPL-MCNC: 13 MG/DL — SIGNIFICANT CHANGE UP (ref 7–23)
CALCIUM SERPL-MCNC: 8.2 MG/DL — LOW (ref 8.4–10.5)
CHLORIDE SERPL-SCNC: 104 MMOL/L — SIGNIFICANT CHANGE UP (ref 96–108)
CO2 SERPL-SCNC: 23 MMOL/L — SIGNIFICANT CHANGE UP (ref 22–31)
CREAT SERPL-MCNC: 0.99 MG/DL — SIGNIFICANT CHANGE UP (ref 0.5–1.3)
GLUCOSE SERPL-MCNC: 65 MG/DL — LOW (ref 70–99)
HCT VFR BLD CALC: 37.1 % — LOW (ref 39–50)
HCV AB S/CO SERPL IA: 0.09 S/CO — SIGNIFICANT CHANGE UP (ref 0–0.99)
HCV AB SERPL-IMP: SIGNIFICANT CHANGE UP
HGB BLD-MCNC: 12.4 G/DL — LOW (ref 13–17)
LIDOCAIN IGE QN: 295 U/L — HIGH (ref 7–60)
MAGNESIUM SERPL-MCNC: 1.4 MG/DL — LOW (ref 1.6–2.6)
MCHC RBC-ENTMCNC: 33.4 GM/DL — SIGNIFICANT CHANGE UP (ref 32–36)
MCHC RBC-ENTMCNC: 37.2 PG — HIGH (ref 27–34)
MCV RBC AUTO: 111.4 FL — HIGH (ref 80–100)
PLATELET # BLD AUTO: 193 K/UL — SIGNIFICANT CHANGE UP (ref 150–400)
POTASSIUM SERPL-MCNC: 4.7 MMOL/L — SIGNIFICANT CHANGE UP (ref 3.5–5.3)
POTASSIUM SERPL-SCNC: 4.7 MMOL/L — SIGNIFICANT CHANGE UP (ref 3.5–5.3)
PROT SERPL-MCNC: 6.1 G/DL — SIGNIFICANT CHANGE UP (ref 6–8.3)
RBC # BLD: 3.33 M/UL — LOW (ref 4.2–5.8)
RBC # FLD: 13.2 % — SIGNIFICANT CHANGE UP (ref 10.3–14.5)
SODIUM SERPL-SCNC: 141 MMOL/L — SIGNIFICANT CHANGE UP (ref 135–145)
WBC # BLD: 7.82 K/UL — SIGNIFICANT CHANGE UP (ref 3.8–10.5)
WBC # FLD AUTO: 7.82 K/UL — SIGNIFICANT CHANGE UP (ref 3.8–10.5)

## 2019-04-21 RX ORDER — LEVOTHYROXINE SODIUM 125 MCG
75 TABLET ORAL AT BEDTIME
Qty: 0 | Refills: 0 | Status: DISCONTINUED | OUTPATIENT
Start: 2019-04-21 | End: 2019-04-24

## 2019-04-21 RX ORDER — MAGNESIUM SULFATE 500 MG/ML
1 VIAL (ML) INJECTION ONCE
Qty: 0 | Refills: 0 | Status: COMPLETED | OUTPATIENT
Start: 2019-04-21 | End: 2019-04-21

## 2019-04-21 RX ORDER — MAGNESIUM OXIDE 400 MG ORAL TABLET 241.3 MG
400 TABLET ORAL
Qty: 0 | Refills: 0 | Status: DISCONTINUED | OUTPATIENT
Start: 2019-04-21 | End: 2019-04-21

## 2019-04-21 RX ORDER — ONDANSETRON 8 MG/1
4 TABLET, FILM COATED ORAL EVERY 6 HOURS
Qty: 0 | Refills: 0 | Status: DISCONTINUED | OUTPATIENT
Start: 2019-04-21 | End: 2019-04-25

## 2019-04-21 RX ORDER — MAGNESIUM SULFATE 500 MG/ML
2 VIAL (ML) INJECTION ONCE
Qty: 0 | Refills: 0 | Status: COMPLETED | OUTPATIENT
Start: 2019-04-21 | End: 2019-04-21

## 2019-04-21 RX ADMIN — Medication 50 GRAM(S): at 13:54

## 2019-04-21 RX ADMIN — MORPHINE SULFATE 2 MILLIGRAM(S): 50 CAPSULE, EXTENDED RELEASE ORAL at 10:05

## 2019-04-21 RX ADMIN — ENOXAPARIN SODIUM 40 MILLIGRAM(S): 100 INJECTION SUBCUTANEOUS at 13:54

## 2019-04-21 RX ADMIN — PANTOPRAZOLE SODIUM 40 MILLIGRAM(S): 20 TABLET, DELAYED RELEASE ORAL at 13:53

## 2019-04-21 RX ADMIN — SODIUM CHLORIDE 150 MILLILITER(S): 9 INJECTION INTRAMUSCULAR; INTRAVENOUS; SUBCUTANEOUS at 09:23

## 2019-04-21 RX ADMIN — MORPHINE SULFATE 2 MILLIGRAM(S): 50 CAPSULE, EXTENDED RELEASE ORAL at 15:57

## 2019-04-21 RX ADMIN — MORPHINE SULFATE 2 MILLIGRAM(S): 50 CAPSULE, EXTENDED RELEASE ORAL at 16:56

## 2019-04-21 RX ADMIN — Medication 100 GRAM(S): at 17:13

## 2019-04-21 RX ADMIN — Medication 75 MICROGRAM(S): at 22:29

## 2019-04-21 RX ADMIN — ONDANSETRON 4 MILLIGRAM(S): 8 TABLET, FILM COATED ORAL at 10:11

## 2019-04-21 RX ADMIN — MORPHINE SULFATE 2 MILLIGRAM(S): 50 CAPSULE, EXTENDED RELEASE ORAL at 09:23

## 2019-04-21 NOTE — PROGRESS NOTE ADULT - SUBJECTIVE AND OBJECTIVE BOX
INTERVAL HPI/OVERNIGHT EVENTS: I feel better.   Vital Signs Last 24 Hrs  T(C): 36.6 (21 Apr 2019 16:39), Max: 37.1 (21 Apr 2019 00:24)  T(F): 97.9 (21 Apr 2019 16:39), Max: 98.8 (21 Apr 2019 04:24)  HR: 81 (21 Apr 2019 16:39) (77 - 92)  BP: 162/88 (21 Apr 2019 16:39) (137/70 - 162/88)  BP(mean): --  RR: 18 (21 Apr 2019 16:39) (18 - 18)  SpO2: 96% (21 Apr 2019 16:39) (94% - 97%)  I&O's Summary    20 Apr 2019 07:01  -  21 Apr 2019 07:00  --------------------------------------------------------  IN: 1800 mL / OUT: 0 mL / NET: 1800 mL    21 Apr 2019 07:01  -  21 Apr 2019 19:12  --------------------------------------------------------  IN: 750 mL / OUT: 0 mL / NET: 750 mL      MEDICATIONS  (STANDING):  enoxaparin Injectable 40 milliGRAM(s) SubCutaneous daily  influenza   Vaccine 0.5 milliLiter(s) IntraMuscular once  pantoprazole  Injectable 40 milliGRAM(s) IV Push daily  sodium chloride 0.9%. 1000 milliLiter(s) (150 mL/Hr) IV Continuous <Continuous>    MEDICATIONS  (PRN):  morphine  - Injectable 2 milliGRAM(s) IV Push every 4 hours PRN Severe Pain (7 - 10)  ondansetron Injectable 4 milliGRAM(s) IV Push every 6 hours PRN Nausea and/or Vomiting    LABS:                        12.4   7.82  )-----------( 193      ( 21 Apr 2019 10:10 )             37.1     04-21    141  |  104  |  13  ----------------------------<  65<L>  4.7   |  23  |  0.99    Ca    8.2<L>      21 Apr 2019 07:21  Phos  2.6     04-20  Mg     1.4     04-21    TPro  6.1  /  Alb  3.6  /  TBili  1.2  /  DBili  x   /  AST  76<H>  /  ALT  28  /  AlkPhos  97  04-21    PT/INR - ( 20 Apr 2019 12:37 )   PT: 14.7 sec;   INR: 1.27 ratio         PTT - ( 20 Apr 2019 12:37 )  PTT:30.9 sec    CAPILLARY BLOOD GLUCOSE              REVIEW OF SYSTEMS:  CONSTITUTIONAL: No fever, weight loss, or fatigue  EYES: No eye pain, visual disturbances, or discharge  ENMT:  No difficulty hearing, tinnitus, vertigo; No sinus or throat pain  NECK: No pain or stiffness  RESPIRATORY: No cough, wheezing, chills or hemoptysis; No shortness of breath  CARDIOVASCULAR: No chest pain, palpitations, dizziness, or leg swelling  GASTROINTESTINAL: No abdominal or epigastric pain. No nausea, vomiting, or hematemesis; No diarrhea or constipation. No melena or hematochezia.  GENITOURINARY: No dysuria, frequency, hematuria, or incontinence  NEUROLOGICAL: No headaches, memory loss, loss of strength, numbness, or tremors      Consultant(s) Notes Reviewed:  [x ] YES  [ ] NO    PHYSICAL EXAM:  GENERAL: NAD, well-groomed, well-developed ,not in any distress ,  HEAD:  Atraumatic, Normocephalic  EYES: EOMI, PERRLA, conjunctiva and sclera clear  ENMT: No tonsillar erythema, exudates, or enlargement; Moist mucous membranes, Good dentition, No lesions  NECK: Supple, No JVD, Normal thyroid  NERVOUS SYSTEM:  Alert & Oriented X3, No focal deficit   CHEST/LUNG: Good air entry bilateral with no  rales, rhonchi, wheezing, or rubs  HEART: Regular rate and rhythm; No murmurs, rubs, or gallops  ABDOMEN: Soft, tender, Nondistended; Bowel sounds present  EXTREMITIES:  2+ Peripheral Pulses, No clubbing, cyanosis, or edema    Care Discussed with Consultants/Other Providers [ x] YES  [ ] NO

## 2019-04-21 NOTE — PROGRESS NOTE ADULT - ASSESSMENT
52 y/o male with PMH of Hypothyroidism, GERD and recent back surgeries with alcohol abuse  presents to the ED for 4 days of nausea and vomiting. Patient not tolerating even small sips of water. + epigastric pain, mostly just prior to vomiting. last bm 3 days ago, not passing gas since that time. no fever or chills. no hx abd surgeries. Reports last colonoscopy a few years ago and reportedly normal.  Recently returned from [place], no sick contacts .     Problem/Plan - 1:  ·  Problem: Idiopathic acute pancreatitis with uninfected necrosis.  Plan: Improving . Hemodynamically stable. IVF,NPO ,Pain meds and holding Abxs as no fever . Likely cause Alcohol . CT scan and US noted.   Will check MRI abdomen.      Problem/Plan - 2:  ·  Problem: Hypothyroid.  Plan: Synthroid 75mcg IV as takes 150mcg PO.      Problem/Plan - 3:  ·  Problem: GERD (gastroesophageal reflux disease).  Plan: PPI IV.      Problem/Plan - 4:  ·  Problem: Alcohol abuse.  Plan: Will watch for withdrawal .      Problem/Plan - 5:  ·  Problem: JOHNATHAN (acute kidney injury).  Plan: IVF . No NSAID etc.      Problem/Plan - 6:  Problem: LFTs abnormal. Plan: Likely alcohol induced.

## 2019-04-22 LAB
ALBUMIN SERPL ELPH-MCNC: 3.1 G/DL — LOW (ref 3.3–5)
ALP SERPL-CCNC: 101 U/L — SIGNIFICANT CHANGE UP (ref 40–120)
ALT FLD-CCNC: 23 U/L — SIGNIFICANT CHANGE UP (ref 10–45)
AMYLASE P1 CFR SERPL: 63 U/L — SIGNIFICANT CHANGE UP (ref 25–125)
ANION GAP SERPL CALC-SCNC: 13 MMOL/L — SIGNIFICANT CHANGE UP (ref 5–17)
AST SERPL-CCNC: 57 U/L — HIGH (ref 10–40)
BILIRUB SERPL-MCNC: 1.1 MG/DL — SIGNIFICANT CHANGE UP (ref 0.2–1.2)
BUN SERPL-MCNC: 16 MG/DL — SIGNIFICANT CHANGE UP (ref 7–23)
CALCIUM SERPL-MCNC: 8 MG/DL — LOW (ref 8.4–10.5)
CHLORIDE SERPL-SCNC: 103 MMOL/L — SIGNIFICANT CHANGE UP (ref 96–108)
CO2 SERPL-SCNC: 24 MMOL/L — SIGNIFICANT CHANGE UP (ref 22–31)
CREAT SERPL-MCNC: 0.7 MG/DL — SIGNIFICANT CHANGE UP (ref 0.5–1.3)
GLUCOSE SERPL-MCNC: 86 MG/DL — SIGNIFICANT CHANGE UP (ref 70–99)
HCT VFR BLD CALC: 32.7 % — LOW (ref 39–50)
HGB BLD-MCNC: 11.4 G/DL — LOW (ref 13–17)
LIDOCAIN IGE QN: 172 U/L — HIGH (ref 7–60)
MAGNESIUM SERPL-MCNC: 2.2 MG/DL — SIGNIFICANT CHANGE UP (ref 1.6–2.6)
MCHC RBC-ENTMCNC: 34.9 GM/DL — SIGNIFICANT CHANGE UP (ref 32–36)
MCHC RBC-ENTMCNC: 37.6 PG — HIGH (ref 27–34)
MCV RBC AUTO: 107.9 FL — HIGH (ref 80–100)
PLATELET # BLD AUTO: 159 K/UL — SIGNIFICANT CHANGE UP (ref 150–400)
POTASSIUM SERPL-MCNC: 4.1 MMOL/L — SIGNIFICANT CHANGE UP (ref 3.5–5.3)
POTASSIUM SERPL-SCNC: 4.1 MMOL/L — SIGNIFICANT CHANGE UP (ref 3.5–5.3)
PROT SERPL-MCNC: 5.5 G/DL — LOW (ref 6–8.3)
RBC # BLD: 3.03 M/UL — LOW (ref 4.2–5.8)
RBC # FLD: 12.5 % — SIGNIFICANT CHANGE UP (ref 10.3–14.5)
SODIUM SERPL-SCNC: 140 MMOL/L — SIGNIFICANT CHANGE UP (ref 135–145)
WBC # BLD: 5.65 K/UL — SIGNIFICANT CHANGE UP (ref 3.8–10.5)
WBC # FLD AUTO: 5.65 K/UL — SIGNIFICANT CHANGE UP (ref 3.8–10.5)

## 2019-04-22 PROCEDURE — 74183 MRI ABD W/O CNTR FLWD CNTR: CPT | Mod: 26

## 2019-04-22 RX ORDER — SODIUM CHLORIDE 9 MG/ML
1000 INJECTION, SOLUTION INTRAVENOUS
Qty: 0 | Refills: 0 | Status: DISCONTINUED | OUTPATIENT
Start: 2019-04-22 | End: 2019-04-23

## 2019-04-22 RX ADMIN — Medication 75 MICROGRAM(S): at 21:49

## 2019-04-22 RX ADMIN — MORPHINE SULFATE 2 MILLIGRAM(S): 50 CAPSULE, EXTENDED RELEASE ORAL at 19:14

## 2019-04-22 RX ADMIN — PANTOPRAZOLE SODIUM 40 MILLIGRAM(S): 20 TABLET, DELAYED RELEASE ORAL at 12:07

## 2019-04-22 RX ADMIN — MORPHINE SULFATE 2 MILLIGRAM(S): 50 CAPSULE, EXTENDED RELEASE ORAL at 01:30

## 2019-04-22 RX ADMIN — SODIUM CHLORIDE 150 MILLILITER(S): 9 INJECTION, SOLUTION INTRAVENOUS at 19:40

## 2019-04-22 RX ADMIN — MORPHINE SULFATE 2 MILLIGRAM(S): 50 CAPSULE, EXTENDED RELEASE ORAL at 10:25

## 2019-04-22 RX ADMIN — MORPHINE SULFATE 2 MILLIGRAM(S): 50 CAPSULE, EXTENDED RELEASE ORAL at 00:39

## 2019-04-22 RX ADMIN — SODIUM CHLORIDE 150 MILLILITER(S): 9 INJECTION INTRAMUSCULAR; INTRAVENOUS; SUBCUTANEOUS at 06:17

## 2019-04-22 RX ADMIN — MORPHINE SULFATE 2 MILLIGRAM(S): 50 CAPSULE, EXTENDED RELEASE ORAL at 17:22

## 2019-04-22 RX ADMIN — SODIUM CHLORIDE 150 MILLILITER(S): 9 INJECTION INTRAMUSCULAR; INTRAVENOUS; SUBCUTANEOUS at 12:07

## 2019-04-22 RX ADMIN — MORPHINE SULFATE 2 MILLIGRAM(S): 50 CAPSULE, EXTENDED RELEASE ORAL at 12:05

## 2019-04-22 NOTE — PROGRESS NOTE ADULT - SUBJECTIVE AND OBJECTIVE BOX
INTERVAL HPI/OVERNIGHT EVENTS: I feel better so want to eat.   Vital Signs Last 24 Hrs  T(C): 36.9 (22 Apr 2019 20:00), Max: 37.1 (22 Apr 2019 00:30)  T(F): 98.4 (22 Apr 2019 20:00), Max: 98.8 (22 Apr 2019 00:30)  HR: 68 (22 Apr 2019 20:00) (61 - 77)  BP: 158/70 (22 Apr 2019 20:00) (150/82 - 168/88)  BP(mean): --  RR: 18 (22 Apr 2019 20:00) (18 - 18)  SpO2: 98% (22 Apr 2019 20:00) (96% - 99%)  I&O's Summary    21 Apr 2019 07:01  -  22 Apr 2019 07:00  --------------------------------------------------------  IN: 2550 mL / OUT: 0 mL / NET: 2550 mL    22 Apr 2019 07:01  -  22 Apr 2019 20:14  --------------------------------------------------------  IN: 1350 mL / OUT: 0 mL / NET: 1350 mL      MEDICATIONS  (STANDING):  enoxaparin Injectable 40 milliGRAM(s) SubCutaneous daily  influenza   Vaccine 0.5 milliLiter(s) IntraMuscular once  lactated ringers. 1000 milliLiter(s) (150 mL/Hr) IV Continuous <Continuous>  levothyroxine Injectable 75 MICROGram(s) IV Push at bedtime  pantoprazole  Injectable 40 milliGRAM(s) IV Push daily    MEDICATIONS  (PRN):  morphine  - Injectable 2 milliGRAM(s) IV Push every 4 hours PRN Severe Pain (7 - 10)  ondansetron Injectable 4 milliGRAM(s) IV Push every 6 hours PRN Nausea and/or Vomiting    LABS:                        11.4   5.65  )-----------( 159      ( 22 Apr 2019 08:53 )             32.7     04-22    140  |  103  |  16  ----------------------------<  86  4.1   |  24  |  0.70    Ca    8.0<L>      22 Apr 2019 07:50  Mg     2.2     04-22    TPro  5.5<L>  /  Alb  3.1<L>  /  TBili  1.1  /  DBili  x   /  AST  57<H>  /  ALT  23  /  AlkPhos  101  04-22        CAPILLARY BLOOD GLUCOSE              REVIEW OF SYSTEMS:  CONSTITUTIONAL: No fever, weight loss, or fatigue  EYES: No eye pain, visual disturbances, or discharge  ENMT:  No difficulty hearing, tinnitus, vertigo; No sinus or throat pain  RESPIRATORY: No cough, wheezing, chills or hemoptysis; No shortness of breath  CARDIOVASCULAR: No chest pain, palpitations, dizziness, or leg swelling  GASTROINTESTINAL: No abdominal or epigastric pain. No nausea, vomiting, or hematemesis; No diarrhea or constipation. No melena or hematochezia.  GENITOURINARY: No dysuria, frequency, hematuria, or incontinence  NEUROLOGICAL: No headaches, memory loss, loss of strength, numbness, or tremors      RADIOLOGY & ADDITIONAL TESTS:    Consultant(s) Notes Reviewed:  [x ] YES  [ ] NO    PHYSICAL EXAM:  GENERAL: NAD, well-groomed, well-developed,not in any distress ,  HEAD:  Atraumatic, Normocephalic  EYES: EOMI, PERRLA, conjunctiva and sclera clear  ENMT: No tonsillar erythema, exudates, or enlargement; Moist mucous membranes, Good dentition, No lesions  NECK: Supple, No JVD, Normal thyroid  NERVOUS SYSTEM:  Alert & Oriented X3, No focal deficit   CHEST/LUNG: Good air entry bilateral with no  rales, rhonchi, wheezing, or rubs  HEART: Regular rate and rhythm; No murmurs, rubs, or gallops  ABDOMEN: Soft, Nontender, Nondistended; Bowel sounds present  EXTREMITIES:  2+ Peripheral Pulses, No clubbing, cyanosis, or edema    Care Discussed with Consultants/Other Providers [ x] YES  [ ] NO

## 2019-04-22 NOTE — PROGRESS NOTE ADULT - ASSESSMENT
52 yo man with a hx of GERD presents to the ED with epigastric pain, N/V, concerning for pancreatitis. Tachycardia and lactic acidemia improved with 3L IVF. Mild epigastric TTP on exam. CT reveals pancreatitis with a 1.3 cm hypodense lesion in the pancreatitic head, possibly necrotic.    - No urgent surgical intervention  - No indication of antibiotics, the pancreatic necrosis does not appear infected, and the leukocytosis is likely reactive  - Pending MRCP  - Surgery will cont to follow    Karen Khan PGY-2  Surgery Pager [phone]

## 2019-04-22 NOTE — CHART NOTE - NSCHARTNOTEFT_GEN_A_CORE
patient s/p MRCP results as follows: Acute pancreatitis with concern for focal necrosis in the uncinate process. Short-term follow-up imaging is recommended. Acute thrombus within the anterior division right portal vein. Marked hepatic steatosis. " Dr everett made aware of portal vein thrombus and GI consult called. Await recs.

## 2019-04-22 NOTE — PROGRESS NOTE ADULT - SUBJECTIVE AND OBJECTIVE BOX
General Surgery Team Daily Progress Note    SUBJECTIVE:   Patient seen and examined, no acute events overnight. Denies abdominal pain at this time, endorses intermittent hiccuping w/ pain but denies N/V.     OBJECTIVE:   Vital Signs Last 24 Hrs  T(C): 36.9 (22 Apr 2019 10:08), Max: 37.1 (22 Apr 2019 00:30)  T(F): 98.4 (22 Apr 2019 10:08), Max: 98.8 (22 Apr 2019 00:30)  HR: 73 (22 Apr 2019 10:08) (70 - 81)  BP: 166/93 (22 Apr 2019 10:08) (150/82 - 166/93)  BP(mean): --  RR: 18 (22 Apr 2019 10:08) (18 - 18)  SpO2: 98% (22 Apr 2019 10:08) (96% - 99%)    PHYSICAL EXAM:  GEN: NAD, resting quietly  PULM: symmetric chest rise bilaterally, no increased WOB  CV: regular rate, peripheral pulses intact  ABD: soft, non-distended, mild epigastric tenderness, no rebound, no guarding, no peritoneal signs  EXTR: no cyanosis or edema, moving all extremities    RADIOLOGY & ADDITIONAL STUDIES: no new studies performed    CBC (04-22 @ 08:53)                          11.4<L>                   5.65    )--------------(  159        --    % Neuts, --    % Lymphs, ANC: --                              32.7<L>  CBC (04-21 @ 10:10)                          12.4<L>                   7.82    )--------------(  193        --    % Neuts, --    % Lymphs, ANC: --                              37.1<L>    BMP (04-22 @ 07:50)       140     |  103     |  16    			Ca++ --      Ca 8.0<L>       ---------------------------------( 86    		Mg 2.2          4.1     |  24      |  0.70  			Ph --      BMP (04-21 @ 07:21)       141     |  104     |  13    			Ca++ --      Ca 8.2<L>       ---------------------------------( 65<L> 		Mg 1.4<L>       4.7     |  23      |  0.99  			Ph --        LFTs (04-22 @ 07:50)      TPro 5.5<L> / Alb 3.1<L> / TBili 1.1 / DBili -- / AST 57<H> / ALT 23 / AlkPhos 101  LFTs (04-21 @ 07:21)      TPro 6.1 / Alb 3.6 / TBili 1.2 / DBili -- / AST 76<H> / ALT 28 / AlkPhos 97

## 2019-04-22 NOTE — PROGRESS NOTE ADULT - ASSESSMENT
54 y/o male with PMH of Hypothyroidism, GERD and recent back surgeries with alcohol abuse  presents to the ED for 4 days of nausea and vomiting. Patient not tolerating even small sips of water. + epigastric pain, mostly just prior to vomiting. last bm 3 days ago, not passing gas since that time. no fever or chills. no hx abd surgeries. Reports last colonoscopy a few years ago and reportedly normal.  Recently returned from [place], no sick contacts .     Problem/Plan - 1:  ·  Problem: Idiopathic acute pancreatitis with uninfected necrosis.  Plan: Improving . Hemodynamically stable. IVF and will start Clears. Pain meds and holding Abxs as no fever . Likely cause Alcohol . CT scan and US noted.    MRI abdomen. < from: MR MRCP w/wo IV Cont (04.22.19 @ 14:55) >  IMPRESSION:     Acute pancreatitis with concern for focal necrosis in the uncinate   process. Short-term follow-up imaging is recommended.    Acute thrombus within the anterior division right portal vein.    Marked hepatic steatosis.    < end of copied text >  [facility] consulted .     Problem/Plan - 2:  ·  Problem: Hypothyroid.  Plan: Synthroid 75mcg IV as takes 150mcg PO.      Problem/Plan - 3:  ·  Problem: GERD (gastroesophageal reflux disease).  Plan: PPI IV.      Problem/Plan - 4:  ·  Problem: Alcohol abuse.  Plan: No Sign or Symptom of withdrawal .      Problem/Plan - 5:  ·  Problem: JOHNATHAN (acute kidney injury).  Plan: IVF . No NSAID etc.      Problem/Plan - 6:  Problem: Right Portal Vein Thrombosis . Plan: GI consulted.      Problem/Plan - 7:  Problem: LFTs abnormal. Plan: Likely alcohol induced. LFT trending.

## 2019-04-23 ENCOUNTER — TRANSCRIPTION ENCOUNTER (OUTPATIENT)
Age: 54
End: 2019-04-23

## 2019-04-23 LAB
FOLATE SERPL-MCNC: <2 NG/ML — LOW
VIT B12 SERPL-MCNC: 391 PG/ML — SIGNIFICANT CHANGE UP (ref 232–1245)

## 2019-04-23 PROCEDURE — 99223 1ST HOSP IP/OBS HIGH 75: CPT | Mod: GC

## 2019-04-23 PROCEDURE — 99233 SBSQ HOSP IP/OBS HIGH 50: CPT

## 2019-04-23 RX ORDER — SODIUM CHLORIDE 9 MG/ML
1000 INJECTION, SOLUTION INTRAVENOUS
Qty: 0 | Refills: 0 | Status: DISCONTINUED | OUTPATIENT
Start: 2019-04-23 | End: 2019-04-24

## 2019-04-23 RX ORDER — APIXABAN 2.5 MG/1
10 TABLET, FILM COATED ORAL EVERY 12 HOURS
Qty: 0 | Refills: 0 | Status: DISCONTINUED | OUTPATIENT
Start: 2019-04-23 | End: 2019-04-25

## 2019-04-23 RX ORDER — AMLODIPINE BESYLATE 2.5 MG/1
2.5 TABLET ORAL DAILY
Qty: 0 | Refills: 0 | Status: DISCONTINUED | OUTPATIENT
Start: 2019-04-23 | End: 2019-04-24

## 2019-04-23 RX ORDER — APIXABAN 2.5 MG/1
1 TABLET, FILM COATED ORAL
Qty: 60 | Refills: 0 | OUTPATIENT
Start: 2019-04-23 | End: 2019-05-22

## 2019-04-23 RX ORDER — APIXABAN 2.5 MG/1
2 TABLET, FILM COATED ORAL
Qty: 24 | Refills: 0 | OUTPATIENT
Start: 2019-04-23 | End: 2019-04-28

## 2019-04-23 RX ADMIN — APIXABAN 10 MILLIGRAM(S): 2.5 TABLET, FILM COATED ORAL at 21:08

## 2019-04-23 RX ADMIN — MORPHINE SULFATE 2 MILLIGRAM(S): 50 CAPSULE, EXTENDED RELEASE ORAL at 08:49

## 2019-04-23 RX ADMIN — Medication 75 MICROGRAM(S): at 21:37

## 2019-04-23 RX ADMIN — SODIUM CHLORIDE 100 MILLILITER(S): 9 INJECTION, SOLUTION INTRAVENOUS at 21:07

## 2019-04-23 RX ADMIN — MORPHINE SULFATE 2 MILLIGRAM(S): 50 CAPSULE, EXTENDED RELEASE ORAL at 21:23

## 2019-04-23 RX ADMIN — AMLODIPINE BESYLATE 2.5 MILLIGRAM(S): 2.5 TABLET ORAL at 21:07

## 2019-04-23 RX ADMIN — MORPHINE SULFATE 2 MILLIGRAM(S): 50 CAPSULE, EXTENDED RELEASE ORAL at 09:15

## 2019-04-23 RX ADMIN — MORPHINE SULFATE 2 MILLIGRAM(S): 50 CAPSULE, EXTENDED RELEASE ORAL at 13:15

## 2019-04-23 RX ADMIN — MORPHINE SULFATE 2 MILLIGRAM(S): 50 CAPSULE, EXTENDED RELEASE ORAL at 12:58

## 2019-04-23 RX ADMIN — PANTOPRAZOLE SODIUM 40 MILLIGRAM(S): 20 TABLET, DELAYED RELEASE ORAL at 12:53

## 2019-04-23 RX ADMIN — SODIUM CHLORIDE 150 MILLILITER(S): 9 INJECTION, SOLUTION INTRAVENOUS at 08:49

## 2019-04-23 RX ADMIN — ENOXAPARIN SODIUM 40 MILLIGRAM(S): 100 INJECTION SUBCUTANEOUS at 12:53

## 2019-04-23 RX ADMIN — MORPHINE SULFATE 2 MILLIGRAM(S): 50 CAPSULE, EXTENDED RELEASE ORAL at 21:08

## 2019-04-23 NOTE — PROGRESS NOTE ADULT - ATTENDING COMMENTS
seen and examined 04-23-19 @ 1420    tolerating clears  c/o nausea but no vomiting    soft / NT / ND      acute alcoholic pancreatitis resolved  -advance diet as tolerated  -abstinence from alcohol in the future    small heterogenous cystic collection in uncinate process  -I'm still not convinced that this is necrosis based on MRI, so he needs repeat CT or MRI in a few months to assure resolution and r/o a pancreatic tumor.    right anterior portal vein thrombosis  possible EtOH cirrhosis  -Given that his acute pancreatitis was relatively mild, I'm still not convinced that it is the etiology of his splanchnic vein thrombosis.  -care as per hepatology

## 2019-04-23 NOTE — CONSULT NOTE ADULT - SUBJECTIVE AND OBJECTIVE BOX
HEPATOLOGY INITIAL CONSULT    HPI:  52 y/o male with PMH of Hypothyroidism, GERD and recent back surgeries with alcohol abuse  presents to the ED for 4 days of nausea and vomiting. Patient not tolerating even small sips of water. + epigastric pain, mostly just prior to vomiting. last bm 3 days ago, not passing gas since that time. no fever or chills. no hx abd surgeries. reports last colonoscopy a few years ago and reportedly normal. recently returned from [place], no sick contacts (20 Apr 2019 19:09)      The patient has been treated for acute pancreatitis. He received further imaging including an MRCP which was significant for an acute R portal vein thrombus and therefore hepatology was consulted for recommendations in regards to anticoagulation.     PAST MEDICAL & SURGICAL HISTORY:  Hernia  Hypothyroid      Review of Systems: Negative except as per HPI.    MEDICATIONS  (STANDING):  enoxaparin Injectable 40 milliGRAM(s) SubCutaneous daily  influenza   Vaccine 0.5 milliLiter(s) IntraMuscular once  lactated ringers. 1000 milliLiter(s) (150 mL/Hr) IV Continuous <Continuous>  levothyroxine Injectable 75 MICROGram(s) IV Push at bedtime  pantoprazole  Injectable 40 milliGRAM(s) IV Push daily    MEDICATIONS  (PRN):  morphine  - Injectable 2 milliGRAM(s) IV Push every 4 hours PRN Severe Pain (7 - 10)  ondansetron Injectable 4 milliGRAM(s) IV Push every 6 hours PRN Nausea and/or Vomiting      ALLERGIES:      SOCIAL HISTORY:  - Alcohol:  - Recreational drug use:    FAMILY HISTORY:      Vital Signs Last 24 Hrs  T(C): 36.9 (23 Apr 2019 07:47), Max: 37.2 (23 Apr 2019 00:00)  T(F): 98.5 (23 Apr 2019 07:47), Max: 98.9 (23 Apr 2019 00:00)  HR: 67 (23 Apr 2019 07:47) (61 - 72)  BP: 168/92 (23 Apr 2019 07:47) (158/70 - 168/92)  BP(mean): --  RR: 18 (23 Apr 2019 07:47) (17 - 18)  SpO2: 96% (23 Apr 2019 07:47) (95% - 98%)    PHYSICAL EXAM:  Constitutional: no acute distress  Eyes: no icterus  Neck: no masses, no LAD  Respiratory: normal inspiratory effort; no wheezing or crackles  Cardiovascular: RRR, normal S1/S2, no murmurs/rubs/gallops  Gastrointestinal: soft, nondistended, nontender, +BS  Rectal:   Extremities: no LE edema  Neurological: AAOx3, no asterixis  Skin: no rashes, bruises, petechiae    LABS:                        11.4   5.65  )-----------( 159      ( 22 Apr 2019 08:53 )             32.7     04-22    140  |  103  |  16  ----------------------------<  86  4.1   |  24  |  0.70    Ca    8.0<L>      22 Apr 2019 07:50  Mg     2.2     04-22    TPro  5.5<L>  /  Alb  3.1<L>  /  TBili  1.1  /  DBili  x   /  AST  57<H>  /  ALT  23  /  AlkPhos  101  04-22      LIVER FUNCTIONS - ( 22 Apr 2019 07:50 )  Alb: 3.1 g/dL / Pro: 5.5 g/dL / ALK PHOS: 101 U/L / ALT: 23 U/L / AST: 57 U/L / GGT: x             RADIOLOGY & ADDITIONAL STUDIES: HEPATOLOGY INITIAL CONSULT    HPI:  52 y/o male with PMH of Hypothyroidism, GERD and recent back surgeries with alcohol abuse  presents to the ED for 4 days of nausea and vomiting. Patient not tolerating even small sips of water. + epigastric pain, mostly just prior to vomiting. last bm 3 days ago, not passing gas since that time. no fever or chills. no hx abd surgeries. reports last colonoscopy a few years ago and reportedly normal. recently returned from [place], no sick contacts (20 Apr 2019 19:09)      The patient has been treated for acute pancreatitis during this admission. He received further imaging including an MRCP with IV contrast which was significant for an acute R portal vein thrombus and therefore hepatology was consulted for recommendations in regards to anticoagulation. He has never had pancreatitis before and endorses social alcohol use apart from an increase in his use in the last week while on vacation. No history of cirrhosis or blood clots.    PAST MEDICAL & SURGICAL HISTORY:  Hernia  Hypothyroid      Review of Systems: Negative except as per HPI.    MEDICATIONS  (STANDING):  enoxaparin Injectable 40 milliGRAM(s) SubCutaneous daily  influenza   Vaccine 0.5 milliLiter(s) IntraMuscular once  lactated ringers. 1000 milliLiter(s) (150 mL/Hr) IV Continuous <Continuous>  levothyroxine Injectable 75 MICROGram(s) IV Push at bedtime  pantoprazole  Injectable 40 milliGRAM(s) IV Push daily    MEDICATIONS  (PRN):  morphine  - Injectable 2 milliGRAM(s) IV Push every 4 hours PRN Severe Pain (7 - 10)  ondansetron Injectable 4 milliGRAM(s) IV Push every 6 hours PRN Nausea and/or Vomiting      ALLERGIES: None       SOCIAL HISTORY:  - Alcohol: Drinks on average one drink per day for the last 10 years.   - Recreational drug use: None     FAMILY HISTORY: Mother on hospice with MM. Father passed from prostate cancer.       Vital Signs Last 24 Hrs  T(C): 36.9 (23 Apr 2019 07:47), Max: 37.2 (23 Apr 2019 00:00)  T(F): 98.5 (23 Apr 2019 07:47), Max: 98.9 (23 Apr 2019 00:00)  HR: 67 (23 Apr 2019 07:47) (61 - 72)  BP: 168/92 (23 Apr 2019 07:47) (158/70 - 168/92)  BP(mean): --  RR: 18 (23 Apr 2019 07:47) (17 - 18)  SpO2: 96% (23 Apr 2019 07:47) (95% - 98%)    PHYSICAL EXAM:  Constitutional: no acute distress  Eyes: no icterus  Neck: no masses, no LAD  Respiratory: normal inspiratory effort; no wheezing or crackles  Cardiovascular: RRR, normal S1/S2, no murmurs/rubs/gallops  Gastrointestinal: soft, nondistended, nontender, +BS  Extremities: no LE edema  Neurological: AAOx3, no asterixis  Skin: no rashes, bruises, petechiae    LABS:                        11.4   5.65  )-----------( 159      ( 22 Apr 2019 08:53 )             32.7     04-22    140  |  103  |  16  ----------------------------<  86  4.1   |  24  |  0.70    Ca    8.0<L>      22 Apr 2019 07:50  Mg     2.2     04-22    TPro  5.5<L>  /  Alb  3.1<L>  /  TBili  1.1  /  DBili  x   /  AST  57<H>  /  ALT  23  /  AlkPhos  101  04-22      LIVER FUNCTIONS - ( 22 Apr 2019 07:50 )  Alb: 3.1 g/dL / Pro: 5.5 g/dL / ALK PHOS: 101 U/L / ALT: 23 U/L / AST: 57 U/L / GGT: x             RADIOLOGY & ADDITIONAL STUDIES:    < from: MR MRCP w/wo IV Cont (04.22.19 @ 14:55) >  IMPRESSION:     Acute pancreatitis with concern for focal necrosis in the uncinate   process. Short-term follow-up imaging is recommended.    Acute thrombus within the anterior division right portal vein.    Marked hepatic steatosis.                    CAMERON MILLAN M.D., RADIOLOGY RESIDENT  This document has been electronically signed.  JABARI CORDON M.D., ATTENDING RADIOLOGIST  This document has been electronically signed. Apr 22 2019  3:34PM    < end of copied text >

## 2019-04-23 NOTE — CONSULT NOTE ADULT - ASSESSMENT
This is a 52 y/o M with PMH of Hypothyroidism, GERD, and alcohol abuse who presented to the ED with 4 days of abdominal pain, nausea, and vomiting and admitted for acute pancreatitis now complicated by new R portal vein thrombus.    IMPRESSION  # Acute Pancreatitis likely secondary to alcohol use  # R portal vein thrombus in the setting of acute pancreatitis without evidence of cirrhosis     RECOMMENDATIONS  - Continue management of pancreatitis with pain control and feeding as tolerated, no need to trend lipase.  - Alcohol cessation counseling  - R portal vein thrombus without evidence of cirrhosis should be anti-coagulated. If no invasive procedures are planned, could initiate a DOAC (rivaroxaban, apixaban) and treat for at least 3-6 months with appropriate outpatient follow up. This is a 54 y/o M with PMH of Hypothyroidism, GERD, and alcohol abuse who presented to the ED with 4 days of abdominal pain, nausea, and vomiting and admitted for acute pancreatitis now complicated by new R portal vein thrombus.    IMPRESSION  # Acute Pancreatitis likely secondary to alcohol use  # R portal vein thrombus in the setting of acute pancreatitis without evidence of cirrhosis     RECOMMENDATIONS  - Continue management of pancreatitis with pain control and feeding as tolerated, no need to trend lipase.  - Alcohol cessation counseling  - R portal vein thrombus without evidence of cirrhosis should be anti-coagulated. If no invasive procedures are planned, could initiate a DOAC (rivaroxaban, apixaban) and treat for at least 3-6 months with appropriate outpatient follow up.     Discussed with Dr. Charles Rocha and GI fellow    Isi Gonzalez  PGY 2 Internal Medicine Resident

## 2019-04-23 NOTE — PROGRESS NOTE ADULT - SUBJECTIVE AND OBJECTIVE BOX
INTERVAL HPI/OVERNIGHT EVENTS: Kids in room . Said feel better.   Vital Signs Last 24 Hrs  T(C): 36.6 (23 Apr 2019 16:02), Max: 37.2 (23 Apr 2019 00:00)  T(F): 97.9 (23 Apr 2019 16:02), Max: 98.9 (23 Apr 2019 00:00)  HR: 63 (23 Apr 2019 16:02) (62 - 72)  BP: 165/98 (23 Apr 2019 16:02) (158/70 - 168/92)  BP(mean): --  RR: 18 (23 Apr 2019 16:02) (17 - 18)  SpO2: 97% (23 Apr 2019 16:02) (95% - 98%)  I&O's Summary    22 Apr 2019 07:01  -  23 Apr 2019 07:00  --------------------------------------------------------  IN: 3390 mL / OUT: 0 mL / NET: 3390 mL    23 Apr 2019 07:01  -  23 Apr 2019 19:13  --------------------------------------------------------  IN: 400 mL / OUT: 0 mL / NET: 400 mL      MEDICATIONS  (STANDING):  apixaban 10 milliGRAM(s) Oral every 12 hours  influenza   Vaccine 0.5 milliLiter(s) IntraMuscular once  lactated ringers. 1000 milliLiter(s) (150 mL/Hr) IV Continuous <Continuous>  levothyroxine Injectable 75 MICROGram(s) IV Push at bedtime  pantoprazole  Injectable 40 milliGRAM(s) IV Push daily    MEDICATIONS  (PRN):  morphine  - Injectable 2 milliGRAM(s) IV Push every 4 hours PRN Severe Pain (7 - 10)  ondansetron Injectable 4 milliGRAM(s) IV Push every 6 hours PRN Nausea and/or Vomiting    LABS:                        11.4   5.65  )-----------( 159      ( 22 Apr 2019 08:53 )             32.7     04-22    140  |  103  |  16  ----------------------------<  86  4.1   |  24  |  0.70    Ca    8.0<L>      22 Apr 2019 07:50  Mg     2.2     04-22    TPro  5.5<L>  /  Alb  3.1<L>  /  TBili  1.1  /  DBili  x   /  AST  57<H>  /  ALT  23  /  AlkPhos  101  04-22        CAPILLARY BLOOD GLUCOSE              REVIEW OF SYSTEMS:  CONSTITUTIONAL: No fever, weight loss, or fatigue  EYES: No eye pain, visual disturbances, or discharge  ENMT:  No difficulty hearing, tinnitus, vertigo; No sinus or throat pain  NECK: No pain or stiffness  RESPIRATORY: No cough, wheezing, chills or hemoptysis; No shortness of breath  CARDIOVASCULAR: No chest pain, palpitations, dizziness, or leg swelling  GASTROINTESTINAL: No abdominal or epigastric pain. No nausea, vomiting, or hematemesis; No diarrhea or constipation. No melena or hematochezia.  GENITOURINARY: No dysuria, frequency, hematuria, or incontinence  NEUROLOGICAL: No headaches, memory loss, loss of strength, numbness, or tremors      Consultant(s) Notes Reviewed:  [x ] YES  [ ] NO    PHYSICAL EXAM:  GENERAL: NAD, well-groomed, well-developed, not in any distress ,  HEAD:  Atraumatic, Normocephalic  EYES: EOMI, PERRLA, conjunctiva and sclera clear  ENMT: No tonsillar erythema, exudates, or enlargement; Moist mucous membranes, Good dentition, No lesions  NECK: Supple, No JVD, Normal thyroid  NERVOUS SYSTEM:  Alert & Oriented X3, No focal deficit   CHEST/LUNG: Good air entry bilateral with no  rales, rhonchi, wheezing, or rubs  HEART: Regular rate and rhythm; No murmurs, rubs, or gallops  ABDOMEN: Soft, mild tender, Nondistended; Bowel sounds present  EXTREMITIES:  2+ Peripheral Pulses, No clubbing, cyanosis, or edema    Care Discussed with Consultants/Other Providers [ x] YES  [ ] NO

## 2019-04-23 NOTE — DISCHARGE NOTE PROVIDER - HOSPITAL COURSE
52 y/o male with PMH of Hypothyroidism, GERD and recent back surgeries with alcohol abuse  presents to the ED for 4 days of nausea and vomiting. Patient not tolerating even small sips of water. + epigastric pain, mostly just prior to vomiting. last bm 3 days ago, not passing gas since that time. no fever or chills. no hx abd surgeries. Reports last colonoscopy a few years ago and reportedly normal.  Recently returned from [place], no sick contacts . 52 y/o male with PMH of Hypothyroidism, GERD and recent back surgeries with alcohol abuse  presents to the ED for 4 days of nausea and vomiting. Patient not tolerating even small sips of water. + epigastric pain, mostly just prior to vomiting. last bm 3 days ago, not passing gas since that time. no fever or chills. no hx abd surgeries. Reports last colonoscopy a few years ago and reportedly normal.  Recently returned from [place], no sick contacts .    TC reveals pancreatitis with a 1.3 cm hypodense lesion in the pancreatitic head, possibly necrotic. · Assessment        52 y/o male with PMH of Hypothyroidism, GERD and recent back surgeries with alcohol abuse  presents to the ED for 4 days of nausea and vomiting. Patient not tolerating even small sips of water. + epigastric pain, mostly just prior to vomiting. last bm 3 days ago, not passing gas since that time. no fever or chills. no hx abd surgeries. Reports last colonoscopy a few years ago and reportedly normal.  Recently returned from [place], no sick contacts .         Problem/Plan - 1:    ·  Problem: Acute pancreatitis with uninfected necrosis.  Plan: Improving . Hemodynamically stable. IVF and will start regular diet. Pain meds and holding Abxs as no fever . Likely cause Alcohol so advised to quit. . CT scan and US noted.      MRI abdomen. < from: MR MRCP w/wo IV Cont (04.22.19 @ 14:55) >    IMPRESSION:         Acute pancreatitis with concern for focal necrosis in the uncinate     process. Short-term follow-up imaging is recommended.        Acute thrombus within the anterior division right portal vein.        Marked hepatic steatosis.        < end of copied text >        Needs repeat CT scan or mRI in 4 to 6 weeks . Explained in detail to patient      Problem/Plan - 2:    ·  Problem: Hypothyroid.  Plan: Synthroid 75mcg IV to PO 150mcg PO.          Problem/Plan - 3:    ·  Problem: GERD (gastroesophageal reflux disease).  Plan: PPI PO now.          Problem/Plan - 4:    ·  Problem: Alcohol abuse.  Plan: No Sign or Symptom of withdrawal . Advised to quit .          Problem/Plan - 5:    ·  Problem: JOHNATHAN (acute kidney injury).  Plan: IVF . No NSAID etc.          Problem/Plan - 6:    Problem: Right Portal Vein Thrombosis . Plan: Hepatology recommending eliquis  for 6 months and Rpt Doppler US in 2 months. Outpt follow up.          Problem/Plan - 7:    Problem: HTN . Plan: Increased Amlodipine to 5mg .         Problem/Plan - 8:    Problem: LFTs abnormal. Plan: Likely alcohol induced. LFT trending.         Disposition : DC planning home to follow up with PCP and GI/Hepatology in 1 to 2 weeks.

## 2019-04-23 NOTE — PROGRESS NOTE ADULT - SUBJECTIVE AND OBJECTIVE BOX
General Surgery Team Daily Progress Note    SUBJECTIVE:   Patient seen and examined, no acute events overnight. Denies abdominal pain at this time, tolerating CLD.     OBJECTIVE:   Vital Signs Last 24 Hrs  T(C): 36.9 (23 Apr 2019 03:57), Max: 37.2 (23 Apr 2019 00:00)  T(F): 98.5 (23 Apr 2019 03:57), Max: 98.9 (23 Apr 2019 00:00)  HR: 72 (23 Apr 2019 03:57) (61 - 73)  BP: 162/70 (23 Apr 2019 03:57) (158/70 - 168/91)  BP(mean): --  RR: 18 (23 Apr 2019 03:57) (17 - 18)  SpO2: 96% (23 Apr 2019 03:57) (95% - 98%)    PHYSICAL EXAM:  GEN: NAD, resting quietly  PULM: symmetric chest rise bilaterally, no increased WOB  CV: regular rate, peripheral pulses intact  ABD: soft, non-distended, nontender, no rebound, no guarding, no peritoneal signs  EXTR: no cyanosis or edema, moving all extremities    CBC (04-22 @ 08:53)                          11.4<L>                   5.65    )--------------(  159        --    % Neuts, --    % Lymphs, ANC: --                              32.7<L>    BMP (04-22 @ 07:50)       140     |  103     |  16    			Ca++ --      Ca 8.0<L>       ---------------------------------( 86    		Mg 2.2          4.1     |  24      |  0.70  			Ph --        LFTs (04-22 @ 07:50)      TPro 5.5<L> / Alb 3.1<L> / TBili 1.1 / DBili -- / AST 57<H> / ALT 23 / AlkPhos 101    < from: MR MRCP w/wo IV Cont (04.22.19 @ 14:55) >  IMPRESSION:     Acute pancreatitis with concern for focal necrosis in the uncinate   process. Short-term follow-up imaging is recommended.    Acute thrombus within the anterior division right portal vein.    Marked hepatic steatosis.    CAMERON MILLAN M.D., RADIOLOGY RESIDENT  This document has been electronically signed.  JABARI CORDON M.D., ATTENDING RADIOLOGIST  This document has been electronically signed. Apr 22 2019  3:34PM        < end of copied text > General Surgery Team Daily Progress Note    SUBJECTIVE:   Patient seen and examined, no acute events overnight. Denies abdominal pain at this time, tolerating CLD.     OBJECTIVE:   Vital Signs Last 24 Hrs  T(C): 36.9 (23 Apr 2019 07:47), Max: 37.2 (23 Apr 2019 00:00)  T(F): 98.5 (23 Apr 2019 07:47), Max: 98.9 (23 Apr 2019 00:00)  HR: 67 (23 Apr 2019 07:47) (61 - 73)  BP: 168/92 (23 Apr 2019 07:47) (158/70 - 168/92)  BP(mean): --  RR: 18 (23 Apr 2019 07:47) (17 - 18)  SpO2: 96% (23 Apr 2019 07:47) (95% - 98%)    PHYSICAL EXAM:  GEN: NAD, resting quietly  PULM: symmetric chest rise bilaterally, no increased WOB  CV: regular rate, peripheral pulses intact  ABD: soft, non-distended, nontender, no rebound, no guarding, no peritoneal signs  EXTR: no cyanosis or edema, moving all extremities    CBC (04-22 @ 08:53)                          11.4<L>                   5.65    )--------------(  159        --    % Neuts, --    % Lymphs, ANC: --                              32.7<L>    BMP (04-22 @ 07:50)       140     |  103     |  16    			Ca++ --      Ca 8.0<L>       ---------------------------------( 86    		Mg 2.2          4.1     |  24      |  0.70  			Ph --        LFTs (04-22 @ 07:50)      TPro 5.5<L> / Alb 3.1<L> / TBili 1.1 / DBili -- / AST 57<H> / ALT 23 / AlkPhos 101    < from: MR MRCP w/wo IV Cont (04.22.19 @ 14:55) >  IMPRESSION:     Acute pancreatitis with concern for focal necrosis in the uncinate   process. Short-term follow-up imaging is recommended.    Acute thrombus within the anterior division right portal vein.    Marked hepatic steatosis.    CAMERON MILLAN M.D., RADIOLOGY RESIDENT  This document has been electronically signed.  JABARI CORDON M.D., ATTENDING RADIOLOGIST  This document has been electronically signed. Apr 22 2019  3:34PM        < end of copied text >

## 2019-04-23 NOTE — DISCHARGE NOTE PROVIDER - CARE PROVIDER_API CALL
Nick Rocha)  Gastroenterology; Internal Medicine  [address] [place] [address]  Phone: [phone]  Fax: [phone]  Follow Up Time:

## 2019-04-23 NOTE — PROGRESS NOTE ADULT - ASSESSMENT
52 y/o male with PMH of Hypothyroidism, GERD and recent back surgeries with alcohol abuse  presents to the ED for 4 days of nausea and vomiting. Patient not tolerating even small sips of water. + epigastric pain, mostly just prior to vomiting. last bm 3 days ago, not passing gas since that time. no fever or chills. no hx abd surgeries. Reports last colonoscopy a few years ago and reportedly normal.  Recently returned from [place], no sick contacts .     Problem/Plan - 1:  ·  Problem: Idiopathic acute pancreatitis with uninfected necrosis.  Plan: Improving . Hemodynamically stable. IVF and will start Clears. Pain meds and holding Abxs as no fever . Likely cause Alcohol . CT scan and US noted.    MRI abdomen. < from: MR MRCP w/wo IV Cont (04.22.19 @ 14:55) >  IMPRESSION:     Acute pancreatitis with concern for focal necrosis in the uncinate   process. Short-term follow-up imaging is recommended.    Acute thrombus within the anterior division right portal vein.    Marked hepatic steatosis.    < end of copied text >     Problem/Plan - 2:  ·  Problem: Hypothyroid.  Plan: Synthroid 75mcg IV as takes 150mcg PO.      Problem/Plan - 3:  ·  Problem: GERD (gastroesophageal reflux disease).  Plan: PPI IV.      Problem/Plan - 4:  ·  Problem: Alcohol abuse.  Plan: No Sign or Symptom of withdrawal .      Problem/Plan - 5:  ·  Problem: JOHNATHAN (acute kidney injury).  Plan: IVF . No NSAID etc.      Problem/Plan - 6:  Problem: Right Portal Vein Thrombosis . Plan: Hepatology recommending NOAC for 6 months and Rpt Doppler US in 2 months.      Problem/Plan - 7:  Problem: HTN . Plan: Started Amlodipine.     Problem/Plan - 8:  Problem: LFTs abnormal. Plan: Likely alcohol induced. LFT trending.

## 2019-04-23 NOTE — DISCHARGE NOTE PROVIDER - NSDCCPCAREPLAN_GEN_ALL_CORE_FT
PRINCIPAL DISCHARGE DIAGNOSIS  Diagnosis: Idiopathic acute pancreatitis with uninfected necrosis  Assessment and Plan of Treatment: Idiopathic acute pancreatitis with uninfected necrosis      SECONDARY DISCHARGE DIAGNOSES  Diagnosis: Thrombosis, portal vein  Assessment and Plan of Treatment:     Diagnosis: LFTs abnormal  Assessment and Plan of Treatment: LFTs abnormal PRINCIPAL DISCHARGE DIAGNOSIS  Diagnosis: Idiopathic acute pancreatitis with uninfected necrosis  Assessment and Plan of Treatment: please  follow up with gastroenterology  outpatient for further care      SECONDARY DISCHARGE DIAGNOSES  Diagnosis: Hypothyroid  Assessment and Plan of Treatment: you do not make enough thyroid hormone  signs & symptoms of low levels of thyroid hormone - tired, getting cold easily, coarse or thin hair, constipation, shortness of breath, swelling, irregular periods  your doctor will do thyroid hormone blood tests at least once a year to monitor if medication dose is adequate  take your thyroid medicine as directed by your doctor & on empty stomach      Diagnosis: Thrombosis, portal vein  Assessment and Plan of Treatment: continue xarelto as instructed. Take your "blood thinners" as prescribed.  Walking is encouraged, increase activity as tolerated.  If you develop new leg pain, swelling, and/or redness contact your healthcare provider.  If you develop new chest pain with difficulty breathing, a rapid heart rate and/or a feeling of passing out call emergency medical services 911.      Diagnosis: LFTs abnormal  Assessment and Plan of Treatment: improved

## 2019-04-23 NOTE — PROGRESS NOTE ADULT - ASSESSMENT
54 yo man with a hx of GERD presents to the ED with epigastric pain, N/V, concerning for pancreatitis. Tachycardia and lactic acidemia improved with 3L IVF. Mild epigastric TTP on exam. CT reveals pancreatitis with a 1.3 cm hypodense lesion in the pancreatitic head, possibly necrotic.    - No urgent surgical intervention  - No indication of antibiotics, the pancreatic necrosis does not appear infected, and the leukocytosis is likely reactive  - Surgery will cont to follow    Karen Khan PGY-2  Surgery Pager [phone] 52 yo man with a hx of GERD presents to the ED with epigastric pain, N/V, concerning for pancreatitis. Tachycardia and lactic acidemia improved with 3L IVF. Mild epigastric TTP on exam. CT reveals pancreatitis with a 1.3 cm hypodense lesion in the pancreatitic head, possibly necrotic.    - No urgent surgical intervention  - No indication of antibiotics, the pancreatic necrosis does not appear infected, and the leukocytosis is likely reactive  - Recommend hepatology for portal vein thrombosis, diffuse steatosis seen on CT and MRCP    Karen Khan PGY-2  Surgery Pager [phone]

## 2019-04-23 NOTE — CONSULT NOTE ADULT - ATTENDING COMMENTS
Patient was seen and examined with Hepatology team at rounds. Agree with above.   A 52 y/o man with history of Hypothyroidism, GERD, and alcohol abuse presented with acute pancreatitis was seen for new Rt. PVT.   Patient denies decompensated liver disease or jaundice or alcohol cirrhosis in the past. Abdominal imaging did not suggest cirrhosis.   PVT likely provoked by acute pancreatitis.   Would recommend anticoagulation DOAC for up to 6 months, and serial D-dimers.  Also abdominal US with Doppler in 2 months to assess resolution of PVT.

## 2019-04-24 LAB
ANION GAP SERPL CALC-SCNC: 13 MMOL/L — SIGNIFICANT CHANGE UP (ref 5–17)
BUN SERPL-MCNC: 12 MG/DL — SIGNIFICANT CHANGE UP (ref 7–23)
CALCIUM SERPL-MCNC: 8.8 MG/DL — SIGNIFICANT CHANGE UP (ref 8.4–10.5)
CHLORIDE SERPL-SCNC: 94 MMOL/L — LOW (ref 96–108)
CO2 SERPL-SCNC: 25 MMOL/L — SIGNIFICANT CHANGE UP (ref 22–31)
CREAT SERPL-MCNC: 0.71 MG/DL — SIGNIFICANT CHANGE UP (ref 0.5–1.3)
GLUCOSE SERPL-MCNC: 85 MG/DL — SIGNIFICANT CHANGE UP (ref 70–99)
HCT VFR BLD CALC: 36.3 % — LOW (ref 39–50)
HGB BLD-MCNC: 12.6 G/DL — LOW (ref 13–17)
MCHC RBC-ENTMCNC: 34.7 GM/DL — SIGNIFICANT CHANGE UP (ref 32–36)
MCHC RBC-ENTMCNC: 37.4 PG — HIGH (ref 27–34)
MCV RBC AUTO: 107.7 FL — HIGH (ref 80–100)
PLATELET # BLD AUTO: 180 K/UL — SIGNIFICANT CHANGE UP (ref 150–400)
POTASSIUM SERPL-MCNC: 4 MMOL/L — SIGNIFICANT CHANGE UP (ref 3.5–5.3)
POTASSIUM SERPL-SCNC: 4 MMOL/L — SIGNIFICANT CHANGE UP (ref 3.5–5.3)
RBC # BLD: 3.37 M/UL — LOW (ref 4.2–5.8)
RBC # FLD: 11.5 % — SIGNIFICANT CHANGE UP (ref 10.3–14.5)
SODIUM SERPL-SCNC: 132 MMOL/L — LOW (ref 135–145)
WBC # BLD: 4.91 K/UL — SIGNIFICANT CHANGE UP (ref 3.8–10.5)
WBC # FLD AUTO: 4.91 K/UL — SIGNIFICANT CHANGE UP (ref 3.8–10.5)

## 2019-04-24 RX ORDER — APIXABAN 2.5 MG/1
1 TABLET, FILM COATED ORAL
Qty: 60 | Refills: 0 | OUTPATIENT
Start: 2019-04-24 | End: 2019-05-23

## 2019-04-24 RX ORDER — RIVAROXABAN 15 MG-20MG
1 KIT ORAL
Qty: 1 | Refills: 0 | OUTPATIENT
Start: 2019-04-24

## 2019-04-24 RX ORDER — FOLIC ACID 0.8 MG
1 TABLET ORAL DAILY
Qty: 0 | Refills: 0 | Status: DISCONTINUED | OUTPATIENT
Start: 2019-04-24 | End: 2019-04-25

## 2019-04-24 RX ORDER — DOCUSATE SODIUM 100 MG
100 CAPSULE ORAL DAILY
Qty: 0 | Refills: 0 | Status: DISCONTINUED | OUTPATIENT
Start: 2019-04-24 | End: 2019-04-25

## 2019-04-24 RX ORDER — APIXABAN 2.5 MG/1
2 TABLET, FILM COATED ORAL
Qty: 24 | Refills: 0 | OUTPATIENT
Start: 2019-04-24 | End: 2019-04-29

## 2019-04-24 RX ORDER — SODIUM CHLORIDE 9 MG/ML
1000 INJECTION INTRAMUSCULAR; INTRAVENOUS; SUBCUTANEOUS
Qty: 0 | Refills: 0 | Status: DISCONTINUED | OUTPATIENT
Start: 2019-04-24 | End: 2019-04-25

## 2019-04-24 RX ORDER — LEVOTHYROXINE SODIUM 125 MCG
150 TABLET ORAL DAILY
Qty: 0 | Refills: 0 | Status: DISCONTINUED | OUTPATIENT
Start: 2019-04-24 | End: 2019-04-25

## 2019-04-24 RX ORDER — AMLODIPINE BESYLATE 2.5 MG/1
5 TABLET ORAL DAILY
Qty: 0 | Refills: 0 | Status: DISCONTINUED | OUTPATIENT
Start: 2019-04-24 | End: 2019-04-25

## 2019-04-24 RX ADMIN — MORPHINE SULFATE 2 MILLIGRAM(S): 50 CAPSULE, EXTENDED RELEASE ORAL at 21:53

## 2019-04-24 RX ADMIN — MORPHINE SULFATE 2 MILLIGRAM(S): 50 CAPSULE, EXTENDED RELEASE ORAL at 10:20

## 2019-04-24 RX ADMIN — APIXABAN 10 MILLIGRAM(S): 2.5 TABLET, FILM COATED ORAL at 05:20

## 2019-04-24 RX ADMIN — SODIUM CHLORIDE 75 MILLILITER(S): 9 INJECTION INTRAMUSCULAR; INTRAVENOUS; SUBCUTANEOUS at 17:56

## 2019-04-24 RX ADMIN — AMLODIPINE BESYLATE 2.5 MILLIGRAM(S): 2.5 TABLET ORAL at 05:20

## 2019-04-24 RX ADMIN — MORPHINE SULFATE 2 MILLIGRAM(S): 50 CAPSULE, EXTENDED RELEASE ORAL at 22:05

## 2019-04-24 RX ADMIN — APIXABAN 10 MILLIGRAM(S): 2.5 TABLET, FILM COATED ORAL at 17:57

## 2019-04-24 RX ADMIN — PANTOPRAZOLE SODIUM 40 MILLIGRAM(S): 20 TABLET, DELAYED RELEASE ORAL at 14:03

## 2019-04-24 RX ADMIN — MORPHINE SULFATE 2 MILLIGRAM(S): 50 CAPSULE, EXTENDED RELEASE ORAL at 10:09

## 2019-04-24 RX ADMIN — SODIUM CHLORIDE 100 MILLILITER(S): 9 INJECTION, SOLUTION INTRAVENOUS at 14:03

## 2019-04-24 RX ADMIN — SODIUM CHLORIDE 100 MILLILITER(S): 9 INJECTION, SOLUTION INTRAVENOUS at 10:09

## 2019-04-24 RX ADMIN — Medication 1 MILLIGRAM(S): at 14:02

## 2019-04-24 NOTE — PROGRESS NOTE ADULT - SUBJECTIVE AND OBJECTIVE BOX
INTERVAL HPI/OVERNIGHT EVENTS: I want to eat .   Vital Signs Last 24 Hrs  T(C): 37.2 (24 Apr 2019 16:07), Max: 37.2 (24 Apr 2019 16:07)  T(F): 98.9 (24 Apr 2019 16:07), Max: 98.9 (24 Apr 2019 16:07)  HR: 68 (24 Apr 2019 16:07) (62 - 68)  BP: 162/95 (24 Apr 2019 16:07) (155/94 - 163/95)  BP(mean): --  RR: 18 (24 Apr 2019 16:07) (18 - 18)  SpO2: 96% (24 Apr 2019 16:07) (94% - 96%)  I&O's Summary    23 Apr 2019 07:01  -  24 Apr 2019 07:00  --------------------------------------------------------  IN: 800 mL / OUT: 0 mL / NET: 800 mL    24 Apr 2019 07:01  -  24 Apr 2019 17:12  --------------------------------------------------------  IN: 350 mL / OUT: 0 mL / NET: 350 mL      MEDICATIONS  (STANDING):  amLODIPine   Tablet 2.5 milliGRAM(s) Oral daily  apixaban 10 milliGRAM(s) Oral every 12 hours  folic acid 1 milliGRAM(s) Oral daily  influenza   Vaccine 0.5 milliLiter(s) IntraMuscular once  lactated ringers. 1000 milliLiter(s) (100 mL/Hr) IV Continuous <Continuous>  levothyroxine Injectable 75 MICROGram(s) IV Push at bedtime  pantoprazole  Injectable 40 milliGRAM(s) IV Push daily    MEDICATIONS  (PRN):  morphine  - Injectable 2 milliGRAM(s) IV Push every 4 hours PRN Severe Pain (7 - 10)  ondansetron Injectable 4 milliGRAM(s) IV Push every 6 hours PRN Nausea and/or Vomiting    LABS:                        12.6   4.91  )-----------( 180      ( 24 Apr 2019 10:34 )             36.3     04-24    132<L>  |  94<L>  |  12  ----------------------------<  85  4.0   |  25  |  0.71    Ca    8.8      24 Apr 2019 07:17          CAPILLARY BLOOD GLUCOSE              REVIEW OF SYSTEMS:  CONSTITUTIONAL: No fever, weight loss, or fatigue  EYES: No eye pain, visual disturbances, or discharge  ENMT:  No difficulty hearing, tinnitus, vertigo; No sinus or throat pain  NECK: No pain or stiffness  RESPIRATORY: No cough, wheezing, chills or hemoptysis; No shortness of breath  CARDIOVASCULAR: No chest pain, palpitations, dizziness, or leg swelling  GASTROINTESTINAL: No abdominal or epigastric pain. No nausea, vomiting, or hematemesis; No diarrhea or constipation. No melena or hematochezia.  GENITOURINARY: No dysuria, frequency, hematuria, or incontinence  NEUROLOGICAL: No headaches, memory loss, loss of strength, numbness, or tremors    Consultant(s) Notes Reviewed:  [x ] YES  [ ] NO    PHYSICAL EXAM:  GENERAL: NAD, well-groomed, well-developed, not in any distress ,  HEAD:  Atraumatic, Normocephalic  EYES: EOMI, PERRLA, conjunctiva and sclera clear  NECK: Supple, No JVD, Normal thyroid  NERVOUS SYSTEM:  Alert & Oriented X3, No focal deficit   CHEST/LUNG: Good air entry bilateral with no  rales, rhonchi, wheezing, or rubs  HEART: Regular rate and rhythm; No murmurs, rubs, or gallops  ABDOMEN: Soft, Nontender, Nondistended; Bowel sounds present  EXTREMITIES:  2+ Peripheral Pulses, No clubbing, cyanosis, or edema    Care Discussed with Consultants/Other Providers [ x] YES  [ ] NO

## 2019-04-24 NOTE — PROGRESS NOTE ADULT - ASSESSMENT
52 y/o male with PMH of Hypothyroidism, GERD and recent back surgeries with alcohol abuse  presents to the ED for 4 days of nausea and vomiting. Patient not tolerating even small sips of water. + epigastric pain, mostly just prior to vomiting. last bm 3 days ago, not passing gas since that time. no fever or chills. no hx abd surgeries. Reports last colonoscopy a few years ago and reportedly normal.  Recently returned from [place], no sick contacts .     Problem/Plan - 1:  ·  Problem: Acute pancreatitis with uninfected necrosis.  Plan: Improving . Hemodynamically stable. IVF and will start regular diet. Pain meds and holding Abxs as no fever . Likely cause Alcohol so advised to quit. . CT scan and US noted.    MRI abdomen. < from: MR MRCP w/wo IV Cont (04.22.19 @ 14:55) >  IMPRESSION:     Acute pancreatitis with concern for focal necrosis in the uncinate   process. Short-term follow-up imaging is recommended.    Acute thrombus within the anterior division right portal vein.    Marked hepatic steatosis.    < end of copied text >     Problem/Plan - 2:  ·  Problem: Hypothyroid.  Plan: Synthroid 75mcg IV to PO 150mcg PO.      Problem/Plan - 3:  ·  Problem: GERD (gastroesophageal reflux disease).  Plan: PPI PO now.      Problem/Plan - 4:  ·  Problem: Alcohol abuse.  Plan: No Sign or Symptom of withdrawal . Advised to quit .      Problem/Plan - 5:  ·  Problem: JOHNATHAN (acute kidney injury).  Plan: IVF . No NSAID etc.      Problem/Plan - 6:  Problem: Right Portal Vein Thrombosis . Plan: Hepatology recommending NOAC for 6 months and Rpt Doppler US in 2 months. Outpt follow up.      Problem/Plan - 7:  Problem: HTN . Plan: Increased Amlodipine to 5mg .     Problem/Plan - 8:  Problem: LFTs abnormal. Plan: Likely alcohol induced. LFT trending.

## 2019-04-25 ENCOUNTER — TRANSCRIPTION ENCOUNTER (OUTPATIENT)
Age: 54
End: 2019-04-25

## 2019-04-25 VITALS
OXYGEN SATURATION: 98 % | TEMPERATURE: 98 F | DIASTOLIC BLOOD PRESSURE: 92 MMHG | SYSTOLIC BLOOD PRESSURE: 156 MMHG | HEART RATE: 67 BPM | RESPIRATION RATE: 18 BRPM

## 2019-04-25 PROCEDURE — 82947 ASSAY GLUCOSE BLOOD QUANT: CPT

## 2019-04-25 PROCEDURE — 96375 TX/PRO/DX INJ NEW DRUG ADDON: CPT

## 2019-04-25 PROCEDURE — 82803 BLOOD GASES ANY COMBINATION: CPT

## 2019-04-25 PROCEDURE — 84100 ASSAY OF PHOSPHORUS: CPT

## 2019-04-25 PROCEDURE — 85610 PROTHROMBIN TIME: CPT

## 2019-04-25 PROCEDURE — 80053 COMPREHEN METABOLIC PANEL: CPT

## 2019-04-25 PROCEDURE — 83605 ASSAY OF LACTIC ACID: CPT

## 2019-04-25 PROCEDURE — 96374 THER/PROPH/DIAG INJ IV PUSH: CPT | Mod: XU

## 2019-04-25 PROCEDURE — 85014 HEMATOCRIT: CPT

## 2019-04-25 PROCEDURE — 85730 THROMBOPLASTIN TIME PARTIAL: CPT

## 2019-04-25 PROCEDURE — 93005 ELECTROCARDIOGRAM TRACING: CPT

## 2019-04-25 PROCEDURE — 86803 HEPATITIS C AB TEST: CPT

## 2019-04-25 PROCEDURE — 82150 ASSAY OF AMYLASE: CPT

## 2019-04-25 PROCEDURE — 82248 BILIRUBIN DIRECT: CPT

## 2019-04-25 PROCEDURE — 82746 ASSAY OF FOLIC ACID SERUM: CPT

## 2019-04-25 PROCEDURE — A9585: CPT

## 2019-04-25 PROCEDURE — 80074 ACUTE HEPATITIS PANEL: CPT

## 2019-04-25 PROCEDURE — 83735 ASSAY OF MAGNESIUM: CPT

## 2019-04-25 PROCEDURE — 80048 BASIC METABOLIC PNL TOTAL CA: CPT

## 2019-04-25 PROCEDURE — 82435 ASSAY OF BLOOD CHLORIDE: CPT

## 2019-04-25 PROCEDURE — 85027 COMPLETE CBC AUTOMATED: CPT

## 2019-04-25 PROCEDURE — 82607 VITAMIN B-12: CPT

## 2019-04-25 PROCEDURE — 82330 ASSAY OF CALCIUM: CPT

## 2019-04-25 PROCEDURE — 83690 ASSAY OF LIPASE: CPT

## 2019-04-25 PROCEDURE — 74183 MRI ABD W/O CNTR FLWD CNTR: CPT

## 2019-04-25 PROCEDURE — 76705 ECHO EXAM OF ABDOMEN: CPT

## 2019-04-25 PROCEDURE — 74177 CT ABD & PELVIS W/CONTRAST: CPT

## 2019-04-25 PROCEDURE — 84295 ASSAY OF SERUM SODIUM: CPT

## 2019-04-25 PROCEDURE — 84132 ASSAY OF SERUM POTASSIUM: CPT

## 2019-04-25 PROCEDURE — 99285 EMERGENCY DEPT VISIT HI MDM: CPT | Mod: 25

## 2019-04-25 PROCEDURE — 96376 TX/PRO/DX INJ SAME DRUG ADON: CPT

## 2019-04-25 PROCEDURE — 82247 BILIRUBIN TOTAL: CPT

## 2019-04-25 RX ORDER — AMLODIPINE BESYLATE 2.5 MG/1
1 TABLET ORAL
Qty: 0 | Refills: 0 | DISCHARGE
Start: 2019-04-25

## 2019-04-25 RX ORDER — APIXABAN 2.5 MG/1
1 TABLET, FILM COATED ORAL
Qty: 60 | Refills: 0 | OUTPATIENT
Start: 2019-04-25 | End: 2019-05-24

## 2019-04-25 RX ORDER — ACETAMINOPHEN 500 MG
650 TABLET ORAL ONCE
Qty: 0 | Refills: 0 | Status: COMPLETED | OUTPATIENT
Start: 2019-04-25 | End: 2019-04-25

## 2019-04-25 RX ORDER — APIXABAN 2.5 MG/1
2 TABLET, FILM COATED ORAL
Qty: 20 | Refills: 0 | OUTPATIENT
Start: 2019-04-25 | End: 2019-04-29

## 2019-04-25 RX ORDER — PANTOPRAZOLE SODIUM 20 MG/1
40 TABLET, DELAYED RELEASE ORAL
Qty: 0 | Refills: 0 | DISCHARGE
Start: 2019-04-25

## 2019-04-25 RX ORDER — APIXABAN 2.5 MG/1
1 TABLET, FILM COATED ORAL
Qty: 60 | Refills: 0
Start: 2019-04-25 | End: 2019-05-24

## 2019-04-25 RX ORDER — LEVOTHYROXINE SODIUM 125 MCG
1 TABLET ORAL
Qty: 30 | Refills: 0
Start: 2019-04-25 | End: 2019-05-24

## 2019-04-25 RX ORDER — APIXABAN 2.5 MG/1
2 TABLET, FILM COATED ORAL
Qty: 20 | Refills: 0
Start: 2019-04-25 | End: 2019-04-29

## 2019-04-25 RX ORDER — FOLIC ACID 0.8 MG
1 TABLET ORAL
Qty: 30 | Refills: 0
Start: 2019-04-25 | End: 2019-05-24

## 2019-04-25 RX ADMIN — Medication 100 MILLIGRAM(S): at 05:01

## 2019-04-25 RX ADMIN — Medication 150 MICROGRAM(S): at 05:02

## 2019-04-25 RX ADMIN — Medication 1 MILLIGRAM(S): at 11:08

## 2019-04-25 RX ADMIN — Medication 650 MILLIGRAM(S): at 02:31

## 2019-04-25 RX ADMIN — PANTOPRAZOLE SODIUM 40 MILLIGRAM(S): 20 TABLET, DELAYED RELEASE ORAL at 11:07

## 2019-04-25 RX ADMIN — MORPHINE SULFATE 2 MILLIGRAM(S): 50 CAPSULE, EXTENDED RELEASE ORAL at 11:14

## 2019-04-25 RX ADMIN — AMLODIPINE BESYLATE 5 MILLIGRAM(S): 2.5 TABLET ORAL at 05:00

## 2019-04-25 RX ADMIN — APIXABAN 10 MILLIGRAM(S): 2.5 TABLET, FILM COATED ORAL at 17:02

## 2019-04-25 RX ADMIN — MORPHINE SULFATE 2 MILLIGRAM(S): 50 CAPSULE, EXTENDED RELEASE ORAL at 11:47

## 2019-04-25 RX ADMIN — APIXABAN 10 MILLIGRAM(S): 2.5 TABLET, FILM COATED ORAL at 05:01

## 2019-04-25 NOTE — PROGRESS NOTE ADULT - REASON FOR ADMISSION
Vomiting with abdominal pain

## 2019-04-25 NOTE — PROGRESS NOTE ADULT - SUBJECTIVE AND OBJECTIVE BOX
INTERVAL HPI/OVERNIGHT EVENTS: Seen and examined earlier . I feel great so want to go home.   Vital Signs Last 24 Hrs  T(C): 36.7 (25 Apr 2019 08:08), Max: 37.2 (24 Apr 2019 16:07)  T(F): 98 (25 Apr 2019 08:08), Max: 98.9 (24 Apr 2019 16:07)  HR: 67 (25 Apr 2019 08:08) (65 - 68)  BP: 156/92 (25 Apr 2019 08:08) (156/92 - 168/96)  BP(mean): --  RR: 18 (25 Apr 2019 08:08) (18 - 18)  SpO2: 98% (25 Apr 2019 08:08) (96% - 98%)  I&O's Summary    24 Apr 2019 07:01  -  25 Apr 2019 07:00  --------------------------------------------------------  IN: 1250 mL / OUT: 0 mL / NET: 1250 mL    25 Apr 2019 07:01  -  25 Apr 2019 11:07  --------------------------------------------------------  IN: 250 mL / OUT: 0 mL / NET: 250 mL      MEDICATIONS  (STANDING):  amLODIPine   Tablet 5 milliGRAM(s) Oral daily  apixaban 10 milliGRAM(s) Oral every 12 hours  docusate sodium 100 milliGRAM(s) Oral daily  folic acid 1 milliGRAM(s) Oral daily  influenza   Vaccine 0.5 milliLiter(s) IntraMuscular once  levothyroxine 150 MICROGram(s) Oral daily  pantoprazole  Injectable 40 milliGRAM(s) IV Push daily  sodium chloride 0.9%. 1000 milliLiter(s) (75 mL/Hr) IV Continuous <Continuous>    MEDICATIONS  (PRN):  morphine  - Injectable 2 milliGRAM(s) IV Push every 4 hours PRN Severe Pain (7 - 10)  ondansetron Injectable 4 milliGRAM(s) IV Push every 6 hours PRN Nausea and/or Vomiting    LABS:                        12.6   4.91  )-----------( 180      ( 24 Apr 2019 10:34 )             36.3     04-24    132<L>  |  94<L>  |  12  ----------------------------<  85  4.0   |  25  |  0.71    Ca    8.8      24 Apr 2019 07:17          CAPILLARY BLOOD GLUCOSE              REVIEW OF SYSTEMS:  CONSTITUTIONAL: No fever, weight loss, or fatigue  EYES: No eye pain, visual disturbances, or discharge  ENMT:  No difficulty hearing, tinnitus, vertigo; No sinus or throat pain  NECK: No pain or stiffness  RESPIRATORY: No cough, wheezing, chills or hemoptysis; No shortness of breath  CARDIOVASCULAR: No chest pain, palpitations, dizziness, or leg swelling  GASTROINTESTINAL: No abdominal or epigastric pain. No nausea, vomiting, or hematemesis; No diarrhea or constipation. No melena or hematochezia.  GENITOURINARY: No dysuria, frequency, hematuria, or incontinence  NEUROLOGICAL: No headaches, memory loss, loss of strength, numbness, or tremors      Consultant(s) Notes Reviewed:  [x ] YES  [ ] NO    PHYSICAL EXAM:  GENERAL: NAD, well-groomed, well-developed,not in any distress ,  HEAD:  Atraumatic, Normocephalic  EYES: EOMI, PERRLA, conjunctiva and sclera clear  ENMT: No tonsillar erythema, exudates, or enlargement; Moist mucous membranes, Good dentition, No lesions  NECK: Supple, No JVD, Normal thyroid  NERVOUS SYSTEM:  Alert & Oriented X3, No focal deficit   CHEST/LUNG: Good air entry bilateral with no  rales, rhonchi, wheezing, or rubs  HEART: Regular rate and rhythm; No murmurs, rubs, or gallops  ABDOMEN: Soft, Nontender, Nondistended; Bowel sounds present  EXTREMITIES:  2+ Peripheral Pulses, No clubbing, cyanosis, or edema    Care Discussed with Consultants/Other Providers [ x] YES  [ ] NO

## 2019-04-25 NOTE — DISCHARGE NOTE NURSING/CASE MANAGEMENT/SOCIAL WORK - NSDCDPATPORTLINK_GEN_ALL_CORE
You can access the [facility], offered by [facility], by registering with the following website: [email or web address]

## 2019-04-25 NOTE — PROGRESS NOTE ADULT - ASSESSMENT
52 y/o male with PMH of Hypothyroidism, GERD and recent back surgeries with alcohol abuse  presents to the ED for 4 days of nausea and vomiting. Patient not tolerating even small sips of water. + epigastric pain, mostly just prior to vomiting. last bm 3 days ago, not passing gas since that time. no fever or chills. no hx abd surgeries. Reports last colonoscopy a few years ago and reportedly normal.  Recently returned from [place], no sick contacts .     Problem/Plan - 1:  ·  Problem: Acute pancreatitis with uninfected necrosis.  Plan: Improving . Hemodynamically stable. IVF and will start regular diet. Pain meds and holding Abxs as no fever . Likely cause Alcohol so advised to quit. . CT scan and US noted.    MRI abdomen. < from: MR MRCP w/wo IV Cont (04.22.19 @ 14:55) >  IMPRESSION:     Acute pancreatitis with concern for focal necrosis in the uncinate   process. Short-term follow-up imaging is recommended.    Acute thrombus within the anterior division right portal vein.    Marked hepatic steatosis.    < end of copied text >     Problem/Plan - 2:  ·  Problem: Hypothyroid.  Plan: Synthroid 75mcg IV to PO 150mcg PO.      Problem/Plan - 3:  ·  Problem: GERD (gastroesophageal reflux disease).  Plan: PPI PO now.      Problem/Plan - 4:  ·  Problem: Alcohol abuse.  Plan: No Sign or Symptom of withdrawal . Advised to quit .      Problem/Plan - 5:  ·  Problem: JOHNATHAN (acute kidney injury).  Plan: IVF . No NSAID etc.      Problem/Plan - 6:  Problem: Right Portal Vein Thrombosis . Plan: Hepatology recommending NOAC for 6 months and Rpt Doppler US in 2 months. Outpt follow up.      Problem/Plan - 7:  Problem: HTN . Plan: Increased Amlodipine to 5mg .     Problem/Plan - 8:  Problem: LFTs abnormal. Plan: Likely alcohol induced. LFT trending. 52 y/o male with PMH of Hypothyroidism, GERD and recent back surgeries with alcohol abuse  presents to the ED for 4 days of nausea and vomiting. Patient not tolerating even small sips of water. + epigastric pain, mostly just prior to vomiting. last bm 3 days ago, not passing gas since that time. no fever or chills. no hx abd surgeries. Reports last colonoscopy a few years ago and reportedly normal.  Recently returned from [place], no sick contacts .     Problem/Plan - 1:  ·  Problem: Acute pancreatitis with uninfected necrosis.  Plan: Improving . Hemodynamically stable. IVF and will start regular diet. Pain meds and holding Abxs as no fever . Likely cause Alcohol so advised to quit. . CT scan and US noted.    MRI abdomen. < from: MR MRCP w/wo IV Cont (04.22.19 @ 14:55) >  IMPRESSION:     Acute pancreatitis with concern for focal necrosis in the uncinate   process. Short-term follow-up imaging is recommended.    Acute thrombus within the anterior division right portal vein.    Marked hepatic steatosis.    < end of copied text >    Needs repeat CT scan or mRI in 4 to 6 weeks . Explained in detail to patient    Problem/Plan - 2:  ·  Problem: Hypothyroid.  Plan: Synthroid 75mcg IV to PO 150mcg PO.      Problem/Plan - 3:  ·  Problem: GERD (gastroesophageal reflux disease).  Plan: PPI PO now.      Problem/Plan - 4:  ·  Problem: Alcohol abuse.  Plan: No Sign or Symptom of withdrawal . Advised to quit .      Problem/Plan - 5:  ·  Problem: JOHNATHAN (acute kidney injury).  Plan: IVF . No NSAID etc.      Problem/Plan - 6:  Problem: Right Portal Vein Thrombosis . Plan: Hepatology recommending NOAC for 6 months and Rpt Doppler US in 2 months. Outpt follow up.      Problem/Plan - 7:  Problem: HTN . Plan: Increased Amlodipine to 5mg .     Problem/Plan - 8:  Problem: LFTs abnormal. Plan: Likely alcohol induced. LFT trending.     Disposition : DC planning home to follow up with PCP and GI/Hepatology in 1 to 2 weeks.

## 2019-04-29 PROBLEM — K46.9 UNSPECIFIED ABDOMINAL HERNIA WITHOUT OBSTRUCTION OR GANGRENE: Chronic | Status: ACTIVE | Noted: 2019-04-20

## 2019-05-14 ENCOUNTER — APPOINTMENT (OUTPATIENT)
Dept: SURGERY | Facility: CLINIC | Age: 54
End: 2019-05-14
Payer: COMMERCIAL

## 2019-05-14 VITALS
HEIGHT: 71 IN | SYSTOLIC BLOOD PRESSURE: 132 MMHG | OXYGEN SATURATION: 100 % | WEIGHT: 163 LBS | BODY MASS INDEX: 22.82 KG/M2 | HEART RATE: 79 BPM | DIASTOLIC BLOOD PRESSURE: 88 MMHG

## 2019-05-14 DIAGNOSIS — I81 PORTAL VEIN THROMBOSIS: ICD-10-CM

## 2019-05-14 PROCEDURE — 99204 OFFICE O/P NEW MOD 45 MIN: CPT

## 2019-05-17 PROBLEM — I81 PORTAL VEIN THROMBOSIS: Status: ACTIVE | Noted: 2019-05-17

## 2019-06-07 ENCOUNTER — OTHER (OUTPATIENT)
Age: 54
End: 2019-06-07

## 2019-06-11 ENCOUNTER — APPOINTMENT (OUTPATIENT)
Dept: SURGERY | Facility: CLINIC | Age: 54
End: 2019-06-11
Payer: COMMERCIAL

## 2019-06-11 VITALS
TEMPERATURE: 97 F | SYSTOLIC BLOOD PRESSURE: 134 MMHG | DIASTOLIC BLOOD PRESSURE: 83 MMHG | HEIGHT: 71 IN | RESPIRATION RATE: 18 BRPM | HEART RATE: 87 BPM | BODY MASS INDEX: 24.08 KG/M2 | WEIGHT: 172 LBS

## 2019-06-11 PROCEDURE — 99214 OFFICE O/P EST MOD 30 MIN: CPT

## 2019-06-12 NOTE — REASON FOR VISIT
[Referred By: ___] : Referred By: [unfilled] [Initial Consultation] : an initial consultation for [FreeTextEntry2] : s/p acute pancreatitis  [Follow-Up Visit] : a follow-up visit for

## 2019-06-12 NOTE — ASSESSMENT
[FreeTextEntry1] : 53 y.o. M with reported ETOH abuse history, s/p recent hospitalization for acute pancreatitis (index episode) and found to have right portal vein thrombosis for which he was seen by hepatologist and was prescribed AC. Will plan for repeat CT abdomen in 1 week, then RTC after.

## 2019-06-12 NOTE — REASON FOR VISIT
[Initial Consultation] : an initial consultation for [Referred By: ___] : Referred By: [unfilled] [FreeTextEntry2] : s/p acute pancreatitis  [Follow-Up Visit] : a follow-up visit for

## 2019-06-17 NOTE — PHYSICAL EXAM
[de-identified] : Supple  [de-identified] : Normal lips  [de-identified] : Anicteric  [de-identified] : Normal respiratory effort  [de-identified] : No distended  [de-identified] : Oriented to person, place and time

## 2019-06-17 NOTE — HISTORY OF PRESENT ILLNESS
[de-identified] : Patient presents for follow up visit, s/p one acute pancreatitis episode (index 4/20/19). He is known to me and returns for review of repeat imaging.  On recent CT abdomen 5/30/19, there is no reported evidence of portal vein thrombosis; Head of pancreas is disorganized, with ?mass. His recent Ca 19-9 level drawn was < 2, which indicates chantel blood type and is therefore not a helpful marker for this patient. Patient reports he is eating ok.

## 2019-06-25 ENCOUNTER — TRANSCRIPTION ENCOUNTER (OUTPATIENT)
Age: 54
End: 2019-06-25

## 2019-07-11 ENCOUNTER — OUTPATIENT (OUTPATIENT)
Dept: OUTPATIENT SERVICES | Facility: HOSPITAL | Age: 54
LOS: 1 days | End: 2019-07-11
Payer: SELF-PAY

## 2019-07-11 ENCOUNTER — RESULT REVIEW (OUTPATIENT)
Age: 54
End: 2019-07-11

## 2019-07-11 ENCOUNTER — APPOINTMENT (OUTPATIENT)
Dept: GASTROENTEROLOGY | Facility: HOSPITAL | Age: 54
End: 2019-07-11

## 2019-07-11 DIAGNOSIS — K86.2 CYST OF PANCREAS: ICD-10-CM

## 2019-07-11 DIAGNOSIS — K85.11 BILIARY ACUTE PANCREATITIS WITH UNINFECTED NECROSIS: ICD-10-CM

## 2019-07-11 PROCEDURE — 43242 EGD US FINE NEEDLE BX/ASPIR: CPT | Mod: GC

## 2019-07-11 PROCEDURE — 88173 CYTOPATH EVAL FNA REPORT: CPT | Mod: 26

## 2019-07-11 PROCEDURE — 88173 CYTOPATH EVAL FNA REPORT: CPT

## 2019-07-11 PROCEDURE — 88305 TISSUE EXAM BY PATHOLOGIST: CPT

## 2019-07-11 PROCEDURE — 43242 EGD US FINE NEEDLE BX/ASPIR: CPT

## 2019-07-11 PROCEDURE — 88305 TISSUE EXAM BY PATHOLOGIST: CPT | Mod: 26

## 2019-09-25 NOTE — ASSESSMENT
DOS:  2/3/20  Location:  [facility]   Patient Status:  Inpatient  Anesthesia:  Choice and single femoral nerve block    Planned Procedure:  Total knee arthroplasty left    Please obtain 2 weeks prior to patient's surgery date whenever possible:  History & Physical Needed for PreOperative Medical Clearance for upcoming surgery.   PreOp Tests Needed (PLEASE ORDER).    - CBC & AUTO DIFFERENTIAL [01108]  - COMPREHENSIVE METABOLIC PANEL [90510]  - URINALYSIS & REFLEX MICRO WITH CULTURE IF INDICATED [[id number]]  - PROTHROMBIN TIME (AKA INR) [11176]... Order if patient currently on Coumadin  - ELECTROCARDIOGRAM 12-LEAD  [ECG1]... within 6 months (Males over 40 yrs old / Females over 50 yrs old)  - XR CHEST PA AND LATERAL [[id number]]... within 6 months    Total Joint Arthroplasty Only:  - STAPH AUREUS/MRSA (PRE-SURG SCREEN) CLINIC COLLECT [[id number]]   Specimen Source (per surgery type): Total Knee = Nares ... Total Hip = Nares/Groin ... Total Shoulder = Nares    Please place orders needed.    [FreeTextEntry1] : S/p episode of acute pancreatitis in April, might be 2/2 ETOH, however possible pancreatic mass on CT. \par -Referral to advance endoscopy GI for EUS to r/o pancreas mass\par -RTC after EUS \par The purpose of the EUS is to R/O malignancy as the cause of the pancreatitis.

## 2019-11-16 NOTE — ED PROVIDER NOTE - NEUROLOGICAL, MLM
Refill approved as requested.  
Alert and oriented, no focal deficits, no motor or sensory deficits.

## 2020-07-31 NOTE — ED ADULT NURSE NOTE - FINAL NURSING ELECTRONIC SIGNATURE
Requested Prescriptions   Pending Prescriptions Disp Refills     oxybutynin (DITROPAN) 5 MG tablet 30 tablet 1     Sig: Take 1 tablet (5 mg) by mouth daily Appointment required for additional refills       There is no refill protocol information for this order        Last office visit: 7/2/2019 with prescribing provider:  Dr. Yung   Future Office Visit:          Denise Behrendt  Specialty CSS    
20-Apr-2019 18:18

## 2020-08-05 ENCOUNTER — INPATIENT (INPATIENT)
Facility: HOSPITAL | Age: 55
LOS: 4 days | Discharge: HOME | DRG: 871 | End: 2020-08-10
Attending: SURGERY | Admitting: SURGERY
Payer: COMMERCIAL

## 2020-08-05 VITALS
HEIGHT: 71 IN | DIASTOLIC BLOOD PRESSURE: 78 MMHG | HEART RATE: 124 BPM | OXYGEN SATURATION: 100 % | TEMPERATURE: 100 F | WEIGHT: 169.98 LBS | SYSTOLIC BLOOD PRESSURE: 125 MMHG | RESPIRATION RATE: 20 BRPM

## 2020-08-05 DIAGNOSIS — R18.8 OTHER ASCITES: ICD-10-CM

## 2020-08-05 LAB
ALBUMIN SERPL ELPH-MCNC: 4.1 G/DL — SIGNIFICANT CHANGE UP (ref 3.3–5)
ALP SERPL-CCNC: 123 U/L — HIGH (ref 40–120)
ALT FLD-CCNC: 6 U/L — LOW (ref 10–45)
ANION GAP SERPL CALC-SCNC: 19 MMOL/L — HIGH (ref 5–17)
APPEARANCE UR: ABNORMAL
APTT BLD: 22.7 SEC — LOW (ref 27.5–35.5)
AST SERPL-CCNC: 18 U/L — SIGNIFICANT CHANGE UP (ref 10–40)
BACTERIA # UR AUTO: NEGATIVE — SIGNIFICANT CHANGE UP
BASOPHILS # BLD AUTO: 0.02 K/UL — SIGNIFICANT CHANGE UP (ref 0–0.2)
BASOPHILS NFR BLD AUTO: 0.2 % — SIGNIFICANT CHANGE UP (ref 0–2)
BILIRUB SERPL-MCNC: 1.6 MG/DL — HIGH (ref 0.2–1.2)
BILIRUB UR-MCNC: ABNORMAL
BUN SERPL-MCNC: 8 MG/DL — SIGNIFICANT CHANGE UP (ref 7–23)
CALCIUM SERPL-MCNC: 9.6 MG/DL — SIGNIFICANT CHANGE UP (ref 8.4–10.5)
CHLORIDE SERPL-SCNC: 94 MMOL/L — LOW (ref 96–108)
CO2 SERPL-SCNC: 24 MMOL/L — SIGNIFICANT CHANGE UP (ref 22–31)
COLOR SPEC: YELLOW — SIGNIFICANT CHANGE UP
CREAT SERPL-MCNC: 0.81 MG/DL — SIGNIFICANT CHANGE UP (ref 0.5–1.3)
DIFF PNL FLD: ABNORMAL
EOSINOPHIL # BLD AUTO: 0.01 K/UL — SIGNIFICANT CHANGE UP (ref 0–0.5)
EOSINOPHIL NFR BLD AUTO: 0.1 % — SIGNIFICANT CHANGE UP (ref 0–6)
EPI CELLS # UR: 2 /HPF — SIGNIFICANT CHANGE UP
GAS PNL BLDV: SIGNIFICANT CHANGE UP
GAS PNL BLDV: SIGNIFICANT CHANGE UP
GLUCOSE SERPL-MCNC: 138 MG/DL — HIGH (ref 70–99)
GLUCOSE UR QL: NEGATIVE — SIGNIFICANT CHANGE UP
HCT VFR BLD CALC: 39.9 % — SIGNIFICANT CHANGE UP (ref 39–50)
HGB BLD-MCNC: 12.9 G/DL — LOW (ref 13–17)
HYALINE CASTS # UR AUTO: 3 /LPF — SIGNIFICANT CHANGE UP (ref 0–7)
IMM GRANULOCYTES NFR BLD AUTO: 0.4 % — SIGNIFICANT CHANGE UP (ref 0–1.5)
INR BLD: 1.27 RATIO — HIGH (ref 0.88–1.16)
KETONES UR-MCNC: ABNORMAL
LEUKOCYTE ESTERASE UR-ACNC: NEGATIVE — SIGNIFICANT CHANGE UP
LIDOCAIN IGE QN: 58 U/L — SIGNIFICANT CHANGE UP (ref 7–60)
LYMPHOCYTES # BLD AUTO: 1.16 K/UL — SIGNIFICANT CHANGE UP (ref 1–3.3)
LYMPHOCYTES # BLD AUTO: 11.1 % — LOW (ref 13–44)
MCHC RBC-ENTMCNC: 31.9 PG — SIGNIFICANT CHANGE UP (ref 27–34)
MCHC RBC-ENTMCNC: 32.3 GM/DL — SIGNIFICANT CHANGE UP (ref 32–36)
MCV RBC AUTO: 98.5 FL — SIGNIFICANT CHANGE UP (ref 80–100)
MONOCYTES # BLD AUTO: 0.76 K/UL — SIGNIFICANT CHANGE UP (ref 0–0.9)
MONOCYTES NFR BLD AUTO: 7.3 % — SIGNIFICANT CHANGE UP (ref 2–14)
NEUTROPHILS # BLD AUTO: 8.42 K/UL — HIGH (ref 1.8–7.4)
NEUTROPHILS NFR BLD AUTO: 80.9 % — HIGH (ref 43–77)
NITRITE UR-MCNC: NEGATIVE — SIGNIFICANT CHANGE UP
NRBC # BLD: 0 /100 WBCS — SIGNIFICANT CHANGE UP (ref 0–0)
PH UR: 6 — SIGNIFICANT CHANGE UP (ref 5–8)
PLATELET # BLD AUTO: 274 K/UL — SIGNIFICANT CHANGE UP (ref 150–400)
POTASSIUM SERPL-MCNC: 3.8 MMOL/L — SIGNIFICANT CHANGE UP (ref 3.5–5.3)
POTASSIUM SERPL-SCNC: 3.8 MMOL/L — SIGNIFICANT CHANGE UP (ref 3.5–5.3)
PROT SERPL-MCNC: 8.3 G/DL — SIGNIFICANT CHANGE UP (ref 6–8.3)
PROT UR-MCNC: 100 — SIGNIFICANT CHANGE UP
PROTHROM AB SERPL-ACNC: 14.9 SEC — HIGH (ref 10.6–13.6)
RBC # BLD: 4.05 M/UL — LOW (ref 4.2–5.8)
RBC # FLD: 17.3 % — HIGH (ref 10.3–14.5)
RBC CASTS # UR COMP ASSIST: 2 /HPF — SIGNIFICANT CHANGE UP (ref 0–4)
SARS-COV-2 RNA SPEC QL NAA+PROBE: SIGNIFICANT CHANGE UP
SODIUM SERPL-SCNC: 137 MMOL/L — SIGNIFICANT CHANGE UP (ref 135–145)
SP GR SPEC: 1.02 — SIGNIFICANT CHANGE UP (ref 1.01–1.02)
TSH SERPL-MCNC: 21.1 UIU/ML — HIGH (ref 0.27–4.2)
UROBILINOGEN FLD QL: ABNORMAL
WBC # BLD: 10.41 K/UL — SIGNIFICANT CHANGE UP (ref 3.8–10.5)
WBC # FLD AUTO: 10.41 K/UL — SIGNIFICANT CHANGE UP (ref 3.8–10.5)
WBC UR QL: 4 /HPF — SIGNIFICANT CHANGE UP (ref 0–5)

## 2020-08-05 PROCEDURE — 71260 CT THORAX DX C+: CPT | Mod: 26

## 2020-08-05 PROCEDURE — 71045 X-RAY EXAM CHEST 1 VIEW: CPT | Mod: 26

## 2020-08-05 PROCEDURE — 99285 EMERGENCY DEPT VISIT HI MDM: CPT

## 2020-08-05 PROCEDURE — 93010 ELECTROCARDIOGRAM REPORT: CPT

## 2020-08-05 PROCEDURE — 74177 CT ABD & PELVIS W/CONTRAST: CPT | Mod: 26

## 2020-08-05 RX ORDER — MEROPENEM 1 G/30ML
1000 INJECTION INTRAVENOUS ONCE
Refills: 0 | Status: COMPLETED | OUTPATIENT
Start: 2020-08-05 | End: 2020-08-05

## 2020-08-05 RX ORDER — LEVOTHYROXINE SODIUM 125 MCG
150 TABLET ORAL DAILY
Refills: 0 | Status: DISCONTINUED | OUTPATIENT
Start: 2020-08-05 | End: 2020-08-05

## 2020-08-05 RX ORDER — ACETAMINOPHEN 500 MG
650 TABLET ORAL ONCE
Refills: 0 | Status: COMPLETED | OUTPATIENT
Start: 2020-08-05 | End: 2020-08-05

## 2020-08-05 RX ORDER — SODIUM CHLORIDE 9 MG/ML
1000 INJECTION, SOLUTION INTRAVENOUS
Refills: 0 | Status: DISCONTINUED | OUTPATIENT
Start: 2020-08-05 | End: 2020-08-06

## 2020-08-05 RX ORDER — PANTOPRAZOLE SODIUM 20 MG/1
40 TABLET, DELAYED RELEASE ORAL DAILY
Refills: 0 | Status: DISCONTINUED | OUTPATIENT
Start: 2020-08-05 | End: 2020-08-07

## 2020-08-05 RX ORDER — AMLODIPINE BESYLATE 2.5 MG/1
10 TABLET ORAL DAILY
Refills: 0 | Status: DISCONTINUED | OUTPATIENT
Start: 2020-08-05 | End: 2020-08-10

## 2020-08-05 RX ORDER — MORPHINE SULFATE 50 MG/1
4 CAPSULE, EXTENDED RELEASE ORAL ONCE
Refills: 0 | Status: DISCONTINUED | OUTPATIENT
Start: 2020-08-05 | End: 2020-08-05

## 2020-08-05 RX ORDER — MEROPENEM 1 G/30ML
1000 INJECTION INTRAVENOUS EVERY 8 HOURS
Refills: 0 | Status: DISCONTINUED | OUTPATIENT
Start: 2020-08-06 | End: 2020-08-07

## 2020-08-05 RX ORDER — LEVOTHYROXINE SODIUM 125 MCG
75 TABLET ORAL AT BEDTIME
Refills: 0 | Status: DISCONTINUED | OUTPATIENT
Start: 2020-08-05 | End: 2020-08-07

## 2020-08-05 RX ORDER — SODIUM CHLORIDE 9 MG/ML
500 INJECTION INTRAMUSCULAR; INTRAVENOUS; SUBCUTANEOUS ONCE
Refills: 0 | Status: COMPLETED | OUTPATIENT
Start: 2020-08-05 | End: 2020-08-05

## 2020-08-05 RX ORDER — PIPERACILLIN AND TAZOBACTAM 4; .5 G/20ML; G/20ML
3.38 INJECTION, POWDER, LYOPHILIZED, FOR SOLUTION INTRAVENOUS ONCE
Refills: 0 | Status: COMPLETED | OUTPATIENT
Start: 2020-08-05 | End: 2020-08-05

## 2020-08-05 RX ORDER — SODIUM CHLORIDE 9 MG/ML
1000 INJECTION INTRAMUSCULAR; INTRAVENOUS; SUBCUTANEOUS ONCE
Refills: 0 | Status: COMPLETED | OUTPATIENT
Start: 2020-08-05 | End: 2020-08-05

## 2020-08-05 RX ORDER — MEROPENEM 1 G/30ML
INJECTION INTRAVENOUS
Refills: 0 | Status: DISCONTINUED | OUTPATIENT
Start: 2020-08-05 | End: 2020-08-07

## 2020-08-05 RX ORDER — ENOXAPARIN SODIUM 100 MG/ML
40 INJECTION SUBCUTANEOUS DAILY
Refills: 0 | Status: DISCONTINUED | OUTPATIENT
Start: 2020-08-05 | End: 2020-08-10

## 2020-08-05 RX ORDER — AMLODIPINE BESYLATE 2.5 MG/1
10 TABLET ORAL DAILY
Refills: 0 | Status: DISCONTINUED | OUTPATIENT
Start: 2020-08-05 | End: 2020-08-05

## 2020-08-05 RX ADMIN — SODIUM CHLORIDE 100 MILLILITER(S): 9 INJECTION, SOLUTION INTRAVENOUS at 23:50

## 2020-08-05 RX ADMIN — Medication 650 MILLIGRAM(S): at 16:42

## 2020-08-05 RX ADMIN — PIPERACILLIN AND TAZOBACTAM 200 GRAM(S): 4; .5 INJECTION, POWDER, LYOPHILIZED, FOR SOLUTION INTRAVENOUS at 16:57

## 2020-08-05 RX ADMIN — MORPHINE SULFATE 4 MILLIGRAM(S): 50 CAPSULE, EXTENDED RELEASE ORAL at 22:29

## 2020-08-05 RX ADMIN — MORPHINE SULFATE 4 MILLIGRAM(S): 50 CAPSULE, EXTENDED RELEASE ORAL at 20:00

## 2020-08-05 RX ADMIN — SODIUM CHLORIDE 1000 MILLILITER(S): 9 INJECTION INTRAMUSCULAR; INTRAVENOUS; SUBCUTANEOUS at 16:51

## 2020-08-05 RX ADMIN — MORPHINE SULFATE 4 MILLIGRAM(S): 50 CAPSULE, EXTENDED RELEASE ORAL at 16:42

## 2020-08-05 RX ADMIN — Medication 650 MILLIGRAM(S): at 17:12

## 2020-08-05 RX ADMIN — MORPHINE SULFATE 4 MILLIGRAM(S): 50 CAPSULE, EXTENDED RELEASE ORAL at 17:12

## 2020-08-05 RX ADMIN — SODIUM CHLORIDE 1000 MILLILITER(S): 9 INJECTION INTRAMUSCULAR; INTRAVENOUS; SUBCUTANEOUS at 16:58

## 2020-08-05 RX ADMIN — MEROPENEM 100 MILLIGRAM(S): 1 INJECTION INTRAVENOUS at 22:30

## 2020-08-05 RX ADMIN — SODIUM CHLORIDE 500 MILLILITER(S): 9 INJECTION INTRAMUSCULAR; INTRAVENOUS; SUBCUTANEOUS at 20:00

## 2020-08-05 NOTE — ED PROVIDER NOTE - NS ED ROS FT
Review of Systems:  Constitutional: No fever, No weight loss, Decreased PO intake last two days  Head: No headache   Eyes: No blurry vision, No diplopia  Neuro: No tremors, No muscle weakness   Cardiovascular: +Chest pain - radiates from abdomen into L chest  Respiratory: No increased respiratory effort, No SOB, No cough  GI: No nausea, No vomiting, No diarrhea  : No dysuria, No hematuria  Skin: No rash  MSK: Chronic lower back pain   All other systems negative except as per HPI

## 2020-08-05 NOTE — H&P ADULT - NSHPPHYSICALEXAM_GEN_ALL_CORE
PHYSICAL EXAM:  Constitutional: well developed, well nourished, NAD  Eyes: anicteric  ENMT: normal facies, symmetric  Respiratory: Breathing comfortably    Gastrointestinal: abdomen soft, nontender, nondistended.   Extremities: FROM, warm  Neurological: intact, non-focal  Psychiatric: oriented x 3; appropriate

## 2020-08-05 NOTE — ED ADULT NURSE NOTE - NSIMPLEMENTINTERV_GEN_ALL_ED
Implemented All Universal Safety Interventions:  [place] to call system. Call bell, personal items and telephone within reach. Instruct patient to call for assistance. Room bathroom lighting operational. Non-slip footwear when patient is off stretcher. Physically safe environment: no spills, clutter or unnecessary equipment. Stretcher in lowest position, wheels locked, appropriate side rails in place.

## 2020-08-05 NOTE — H&P ADULT - HISTORY OF PRESENT ILLNESS
Patient is a 54 year old male with a past medical history of GERD, PUD last had an endoscopy 5 weeks ago showing gastric ulcer, hypothyroidism, and PTSD, with past surgical history of alcoholic pancreatitis, admitted previously for pancreatitis in April 22, MRCP showed uncinate necrosis. Patient seen outpatient by Dr Duke, patient received EUS that was negative for any cancer. Patient complains of 5 days of diffuse sharp abdominal pain that is non radiating. Patient states that is happens when he eats food. He states that pain is similar to when he had pancreatitis in past. Patient received CT scan showing Interval development of peripancreatic inflammatory changes and multiple rim-enhancing fluid collections the largest of which is seen adjacent to the pancreatic tail/splenic hilum which measures 4.0 x 2.9 cm. Additional collection adjacent to the uncinate process the pancreas as well as within the spleen. These are felt to represent multiple abscesses, and are new since previous exam. In the ED patient was tachycardic, febrile, and WBC was WNL. Patient denies chest pain, SOB, dizziness and lightheadedness

## 2020-08-05 NOTE — ED PROVIDER NOTE - OBJECTIVE STATEMENT
54 yr M with HTN, GERD, PUD, PTSD, hypothyroidism who presents with 5 days of abdominal pain that acutely worsened in the past 24hrs. Pain is 10/10 in severity, cramping in nature, Located in the LUQ and mid-epigastrium with radiation into the chest. He has chronic lower back pain and does not endorse any new back pain currently. The pain is neither worse nor better when he changes position. Pt ate avocados, mixed veggies, and had two shots of alcohol over the weekend prior to symptom onset, which he believes triggered this episode. He has not had any N/V/D but does feel like he needs to belch but cannot. He saw his gastroenterologist in the office yesterday due to his pain, but felt the pain was bad enough that he could not wait for the results of the US or labs before coming into the ED. Pt has a 19 pack-year smoking history but quit 30 years ago. He has not taken any OTC or other medications for pain control. 54 yr M with HTN, GERD, PUD, PTSD, hypothyroidism, and admission for pancreatitis last year who presents with 5 days of abdominal pain that acutely worsened in the past 24hrs. Pain is 10/10 in severity, cramping in nature, Located in the LUQ and mid-epigastrium with radiation into the chest. He has chronic lower back pain and does not endorse any new back pain currently. The pain is neither worse nor better when he changes position. Pt ate avocados, mixed veggies, and had two shots of alcohol over the weekend prior to symptom onset, which he believes triggered this episode. He has not had any N/V/D but does feel like he needs to belch but cannot. He saw his gastroenterologist in the office yesterday due to his pain, but felt the pain was bad enough that he could not wait for the results of the US or labs before coming into the ED. Pt has a 19 pack-year smoking history but quit 30 years ago. He has not taken any OTC or other medications for pain control.  DDX includes pancreatitis

## 2020-08-05 NOTE — ED ADULT NURSE REASSESSMENT NOTE - NS ED NURSE REASSESS COMMENT FT1
Patient given urinal, IV fluids still running. A&O, patient seems to be comfortable at this time. Bed locked and in lowest position.

## 2020-08-05 NOTE — ED PROVIDER NOTE - PROGRESS NOTE DETAILS
Emely Leonard MD spoke w/ surgery resident recommending admission to Dr. Hernandez service, for additional workup and monitoring

## 2020-08-05 NOTE — ED ADULT NURSE NOTE - OBJECTIVE STATEMENT
Male 54 years old with past medical history of Hypothyroidism, HTN, PTSD and GERD came in for abdominal pain cramping, sharp 10/10 radiating to his left shoulder which started over the weekend. Denies nausea, vomiting, diarrhea. chest pain, sob or urinary symptoms. Pt states it started after eating avocado. Drinks alcohol occasionally, last drink Saturday. Left lower quadrant tender on palpation. Febrile temp 101.9. History of back and neck surgery, states he has always pain in his back. Comfort/safety maintained. Will continue to reassess.

## 2020-08-05 NOTE — H&P ADULT - NSHPLABSRESULTS_GEN_ALL_CORE
< from: CT Abdomen and Pelvis w/ IV Cont (08.05.20 @ 18:46) >    Chest: Thyroid gland is unremarkable. Aorta and pulmonary arteries are normal in size. No pericardial effusion. No lymphadenopathy. Centrilobular emphysema is noted.    Small left pleural effusion noted with left basilar consolidation, new since previous exam.    Abdomen and Pelvis: Small amount of perihepatic and perisplenic ascites is noted.    No focal hepatic lesions are seen. Multifocal hypodensity noted within the spleen. Additionally there are several rim-enhancing fluid collections adjacent to the tail of the pancreas and at the splenic hilum, the largest of which measures 2.9 x 4.0 cm. A second collection is noted adjacent to the uncinate process and measures 2.3 cm, also new since the previous exam.    No retroperitoneal lymphadenopathy is seen. The retroperitoneal vasculature is within normal limits.    Both kidneys enhance symmetrically without stones or hydronephrosis    No evidence of bowel obstruction. Small amount of pelvic ascites is also noted.    No acute bony abnormality    IMPRESSION:    CT CHEST: Interval development of small left effusion with left basilar consolidation.      CT abdomen and pelvis:    Acute pancreatitis.    Interval development of peripancreatic inflammatory changes and multiple rim-enhancing fluid collections the largest of which is seen adjacent to the pancreatic tail/splenic hilum which measures 4.0 x 2.9 cm. Additional collection adjacent to the uncinate process the pancreas as well as within the spleen. These are felt to represent multiple abscesses, and are new since previous exam.    No evidence of free air.    Ascites    < end of copied text >

## 2020-08-05 NOTE — ED ADULT NURSE REASSESSMENT NOTE - NS ED NURSE REASSESS COMMENT FT1
Patient aware they are being admitted to the hospital. Will be notified when bed is available. Patient/family has no further questions at this time. Vitals stable, patient awake with no complaints at this time. Call bell at bedside.

## 2020-08-05 NOTE — ED PROVIDER NOTE - PHYSICAL EXAMINATION
PHYSICAL EXAM  GENERAL: NAD, lying comfortably in bed   HEAD:  Atraumatic, Normocephalic  EYES: EOMI b/l, PERRLA b/l, conjunctiva and sclera clear  NECK: Supple, No JVD, No LAD   CHEST/LUNG: Clear to auscultation bilaterally; No crackles or wheezes  HEART: Tachycardic; S1 and S2 present, No murmurs, rubs, or gallops  ABDOMEN: Nondistended. Tenderness to palpation in the LUQ and mid-epigastrium. Guarding   EXTREMITIES:  2+ Peripheral Pulses, No clubbing, cyanosis, or edema  NEURO: AAOx3, non-focal   SKIN: No rashes or lesions PHYSICAL EXAM  GENERAL: NAD, lying comfortably in bed   HEAD:  Atraumatic, Normocephalic  EYES: EOMI b/l, PERRLA b/l, conjunctiva and sclera clear  NECK: Supple, No JVD, No LAD   CHEST/LUNG: Clear to auscultation bilaterally; No crackles or wheezes  HEART: Tachycardic; S1 and S2 present, No murmurs, rubs, or gallops  ABDOMEN: Nondistended. Tenderness to palpation in the LUQ, LLQ, and mid-epigastrium. Guarding   EXTREMITIES:  2+ Peripheral Pulses, No clubbing, cyanosis, or edema  NEURO: AAOx3, non-focal   SKIN: No rashes or lesions

## 2020-08-05 NOTE — ED PROVIDER NOTE - LONGEST PERIOD TOBACCO-FREE
CERTIFICATE OF RETURN TO Work    May 8, 2018      Re:   Parveen Ramirez  [address] [place]  [place] [address]                        This is to certify that Parveen Ramirez was seen on 5/8/2018 and can can return to regular work on 5/14/18.  May return sooner if able.    RESTRICTIONS: none.        SIGNATURE:___________________________________________,   5/8/2018      [address] [facility] [address] [phone]  
30 years

## 2020-08-05 NOTE — ED ADULT NURSE NOTE - CHPI ED NUR SYMPTOMS NEG
no vomiting/no diarrhea/no hematuria/no blood in stool/no burning urination/no nausea/no fever/no abdominal distension/no dysuria/no chills

## 2020-08-05 NOTE — ED PROVIDER NOTE - NS ED ATTENDING STATEMENT MOD
I have personally seen and examined this patient.  I have fully participated in the care of this patient. I have reviewed all pertinent clinical information, including history, physical exam, plan and the Resident’s note and agree except as noted. I have personally seen and examined this patient. I have fully participated in the care of this patient. I have reviewed all pertinent clinical information, including history, physical exam, plan and the Medical/PA/NP Student's note and agree except as noted.

## 2020-08-05 NOTE — H&P ADULT - ASSESSMENT
Patient is a 54 year old male with a past medical history of GERD, PUD last had an endoscopy 5 weeks ago showing gastric ulcer, hypothyroidism, and PTSD, with past surgical history of alcoholic pancreatitis, admitted previously for pancreatitis in April 22, MRCP showed uncinate necrosis. Patient seen outpatient by Dr Duke, patient received EUS that was negative for any cancer. Patient complains of 5 days of diffuse sharp abdominal pain that is non radiating.  CT scan showing Interval development of peripancreatic inflammatory changes and multiple rim-enhancing fluid collections the largest of which is seen adjacent to the pancreatic tail/splenic hilum which measures 4.0 x 2.9 cm. Additional collection adjacent to the uncinate process the pancreas as well as within the spleen    - Admit to Dr Duke  - NPO  - Fluid resuscitation  - IV antibiotics  - MRCP in the morning

## 2020-08-05 NOTE — ED PROVIDER NOTE - ATTENDING CONTRIBUTION TO CARE
54 M w/ pmh of pancreatitis possibly alcohol induced from prior notes, peptic ulcer disease on omeprazole, prior portal vein thrombosis on AC, now off taken off by GI doctor, hypothyroidism, GERD, presents to the ED w/ sharp abdominal pain in the epigastric to LLQ, area, no testicular symptoms, no vomiting. +fever, no nausea,  on exam, pt is awake and alert in moderate distress w/ tenderness in the LLQ, and epigastric area, clear lungs, no lower extremity edema, heart mild tachycardia.   concern for diverticulitis, vs pancreatitis, vs intraabdominal abscess, will obtain labs, ct imaging and likely admit pt for further monitoring. no rebound/guarding, unlikely peritonitis.

## 2020-08-05 NOTE — ED ADULT NURSE REASSESSMENT NOTE - NS ED NURSE REASSESS COMMENT FT1
Report received from TARA Aguilera. Patient A&Ox4, breathing spontaneous and unlabored, reports pain at this time, medication given as per MD orders. Vitals stable. Surgery at bedside at this time. Patient denies chest pain, shortness of breath. Bed locked and in lowest position with call bell at bedside.

## 2020-08-05 NOTE — ED ADULT NURSE REASSESSMENT NOTE - NS ED NURSE REASSESS COMMENT FT1
Pt resting comfortably on stretcher, VSS, endorses partial pain relief. Pending CT results and dispo. Safety maintained, will continue to monitor.

## 2020-08-06 LAB
ALBUMIN SERPL ELPH-MCNC: 3.1 G/DL — LOW (ref 3.3–5)
ALP SERPL-CCNC: 82 U/L — SIGNIFICANT CHANGE UP (ref 40–120)
ALT FLD-CCNC: 6 U/L — LOW (ref 10–45)
ANION GAP SERPL CALC-SCNC: 14 MMOL/L — SIGNIFICANT CHANGE UP (ref 5–17)
AST SERPL-CCNC: 10 U/L — SIGNIFICANT CHANGE UP (ref 10–40)
BILIRUB SERPL-MCNC: 1.3 MG/DL — HIGH (ref 0.2–1.2)
BUN SERPL-MCNC: 7 MG/DL — SIGNIFICANT CHANGE UP (ref 7–23)
CALCIUM SERPL-MCNC: 8.2 MG/DL — LOW (ref 8.4–10.5)
CHLORIDE SERPL-SCNC: 99 MMOL/L — SIGNIFICANT CHANGE UP (ref 96–108)
CO2 SERPL-SCNC: 25 MMOL/L — SIGNIFICANT CHANGE UP (ref 22–31)
CREAT SERPL-MCNC: 0.65 MG/DL — SIGNIFICANT CHANGE UP (ref 0.5–1.3)
CULTURE RESULTS: SIGNIFICANT CHANGE UP
GLUCOSE SERPL-MCNC: 94 MG/DL — SIGNIFICANT CHANGE UP (ref 70–99)
HCT VFR BLD CALC: 30.2 % — LOW (ref 39–50)
HGB BLD-MCNC: 9.8 G/DL — LOW (ref 13–17)
MAGNESIUM SERPL-MCNC: 1.5 MG/DL — LOW (ref 1.6–2.6)
MCHC RBC-ENTMCNC: 31.7 PG — SIGNIFICANT CHANGE UP (ref 27–34)
MCHC RBC-ENTMCNC: 32.5 GM/DL — SIGNIFICANT CHANGE UP (ref 32–36)
MCV RBC AUTO: 97.7 FL — SIGNIFICANT CHANGE UP (ref 80–100)
NRBC # BLD: 0 /100 WBCS — SIGNIFICANT CHANGE UP (ref 0–0)
PHOSPHATE SERPL-MCNC: 2.7 MG/DL — SIGNIFICANT CHANGE UP (ref 2.5–4.5)
PLATELET # BLD AUTO: 206 K/UL — SIGNIFICANT CHANGE UP (ref 150–400)
POTASSIUM SERPL-MCNC: 3.4 MMOL/L — LOW (ref 3.5–5.3)
POTASSIUM SERPL-SCNC: 3.4 MMOL/L — LOW (ref 3.5–5.3)
PROT SERPL-MCNC: 6.1 G/DL — SIGNIFICANT CHANGE UP (ref 6–8.3)
RBC # BLD: 3.09 M/UL — LOW (ref 4.2–5.8)
RBC # FLD: 16.9 % — HIGH (ref 10.3–14.5)
SODIUM SERPL-SCNC: 138 MMOL/L — SIGNIFICANT CHANGE UP (ref 135–145)
SPECIMEN SOURCE: SIGNIFICANT CHANGE UP
WBC # BLD: 5.31 K/UL — SIGNIFICANT CHANGE UP (ref 3.8–10.5)
WBC # FLD AUTO: 5.31 K/UL — SIGNIFICANT CHANGE UP (ref 3.8–10.5)

## 2020-08-06 PROCEDURE — 74183 MRI ABD W/O CNTR FLWD CNTR: CPT | Mod: 26

## 2020-08-06 RX ORDER — MAGNESIUM SULFATE 500 MG/ML
2 VIAL (ML) INJECTION ONCE
Refills: 0 | Status: COMPLETED | OUTPATIENT
Start: 2020-08-06 | End: 2020-08-06

## 2020-08-06 RX ORDER — POTASSIUM CHLORIDE 20 MEQ
10 PACKET (EA) ORAL
Refills: 0 | Status: COMPLETED | OUTPATIENT
Start: 2020-08-06 | End: 2020-08-06

## 2020-08-06 RX ORDER — DEXTROSE MONOHYDRATE, SODIUM CHLORIDE, AND POTASSIUM CHLORIDE 50; .745; 4.5 G/1000ML; G/1000ML; G/1000ML
1000 INJECTION, SOLUTION INTRAVENOUS
Refills: 0 | Status: DISCONTINUED | OUTPATIENT
Start: 2020-08-06 | End: 2020-08-07

## 2020-08-06 RX ORDER — HYDROMORPHONE HYDROCHLORIDE 2 MG/ML
1 INJECTION INTRAMUSCULAR; INTRAVENOUS; SUBCUTANEOUS
Refills: 0 | Status: DISCONTINUED | OUTPATIENT
Start: 2020-08-06 | End: 2020-08-07

## 2020-08-06 RX ORDER — HYDROMORPHONE HYDROCHLORIDE 2 MG/ML
0.5 INJECTION INTRAMUSCULAR; INTRAVENOUS; SUBCUTANEOUS
Refills: 0 | Status: DISCONTINUED | OUTPATIENT
Start: 2020-08-06 | End: 2020-08-07

## 2020-08-06 RX ADMIN — Medication 100 MILLIEQUIVALENT(S): at 15:47

## 2020-08-06 RX ADMIN — ENOXAPARIN SODIUM 40 MILLIGRAM(S): 100 INJECTION SUBCUTANEOUS at 12:49

## 2020-08-06 RX ADMIN — PANTOPRAZOLE SODIUM 40 MILLIGRAM(S): 20 TABLET, DELAYED RELEASE ORAL at 12:49

## 2020-08-06 RX ADMIN — SODIUM CHLORIDE 100 MILLILITER(S): 9 INJECTION, SOLUTION INTRAVENOUS at 05:44

## 2020-08-06 RX ADMIN — Medication 100 MILLIEQUIVALENT(S): at 11:00

## 2020-08-06 RX ADMIN — MEROPENEM 100 MILLIGRAM(S): 1 INJECTION INTRAVENOUS at 14:23

## 2020-08-06 RX ADMIN — Medication 75 MICROGRAM(S): at 22:34

## 2020-08-06 RX ADMIN — DEXTROSE MONOHYDRATE, SODIUM CHLORIDE, AND POTASSIUM CHLORIDE 100 MILLILITER(S): 50; .745; 4.5 INJECTION, SOLUTION INTRAVENOUS at 10:56

## 2020-08-06 RX ADMIN — MEROPENEM 100 MILLIGRAM(S): 1 INJECTION INTRAVENOUS at 05:45

## 2020-08-06 RX ADMIN — HYDROMORPHONE HYDROCHLORIDE 1 MILLIGRAM(S): 2 INJECTION INTRAMUSCULAR; INTRAVENOUS; SUBCUTANEOUS at 20:39

## 2020-08-06 RX ADMIN — HYDROMORPHONE HYDROCHLORIDE 1 MILLIGRAM(S): 2 INJECTION INTRAMUSCULAR; INTRAVENOUS; SUBCUTANEOUS at 06:46

## 2020-08-06 RX ADMIN — Medication 100 MILLIEQUIVALENT(S): at 13:39

## 2020-08-06 RX ADMIN — HYDROMORPHONE HYDROCHLORIDE 1 MILLIGRAM(S): 2 INJECTION INTRAMUSCULAR; INTRAVENOUS; SUBCUTANEOUS at 06:09

## 2020-08-06 RX ADMIN — AMLODIPINE BESYLATE 10 MILLIGRAM(S): 2.5 TABLET ORAL at 05:45

## 2020-08-06 RX ADMIN — Medication 50 GRAM(S): at 10:57

## 2020-08-06 RX ADMIN — MEROPENEM 100 MILLIGRAM(S): 1 INJECTION INTRAVENOUS at 21:27

## 2020-08-06 RX ADMIN — HYDROMORPHONE HYDROCHLORIDE 1 MILLIGRAM(S): 2 INJECTION INTRAMUSCULAR; INTRAVENOUS; SUBCUTANEOUS at 21:36

## 2020-08-06 NOTE — PROGRESS NOTE ADULT - SUBJECTIVE AND OBJECTIVE BOX
Blue surgery Progress Note     Subjective: pain improved since admission, complaining of gas pains, no n/v,     PE:  NAD AAOx3  abd soft, mild LLQ tenderness, nondistended       Vital Signs Last 24 Hrs  T(C): 36.9 (06 Aug 2020 06:00), Max: 38.8 (05 Aug 2020 16:00)  T(F): 98.5 (06 Aug 2020 06:00), Max: 101.9 (05 Aug 2020 16:00)  HR: 88 (06 Aug 2020 06:00) (87 - 124)  BP: 116/73 (06 Aug 2020 06:00) (116/73 - 165/75)  BP(mean): --  RR: 17 (06 Aug 2020 06:00) (16 - 24)  SpO2: 97% (06 Aug 2020 06:00) (94% - 100%)    I&O's Detail    05 Aug 2020 07:01  -  06 Aug 2020 07:00  --------------------------------------------------------  IN:    lactated ringers.: 800 mL  Total IN: 800 mL    OUT:    Voided: 50 mL  Total OUT: 50 mL    Total NET: 750 mL          Daily Height in cm: 180.34 (05 Aug 2020 13:58)    Daily     MEDICATIONS  (STANDING):  amLODIPine   Tablet 10 milliGRAM(s) Oral daily  enoxaparin Injectable 40 milliGRAM(s) SubCutaneous daily  lactated ringers. 1000 milliLiter(s) (100 mL/Hr) IV Continuous <Continuous>  levothyroxine Injectable 75 MICROGram(s) IV Push at bedtime  magnesium sulfate  IVPB 2 Gram(s) IV Intermittent once  meropenem  IVPB 1000 milliGRAM(s) IV Intermittent every 8 hours  meropenem  IVPB      pantoprazole  Injectable 40 milliGRAM(s) IV Push daily  potassium chloride  10 mEq/100 mL IVPB 10 milliEquivalent(s) IV Intermittent every 1 hour    MEDICATIONS  (PRN):  HYDROmorphone  Injectable 0.5 milliGRAM(s) IV Push four times a day PRN Moderate Pain (4 - 6)  HYDROmorphone  Injectable 1 milliGRAM(s) IV Push four times a day PRN Severe Pain (7 - 10)      LABS:                        9.8    5.31  )-----------( 206      ( 06 Aug 2020 08:10 )             30.2     08-06    138  |  99  |  7   ----------------------------<  94  3.4<L>   |  25  |  0.65    Ca    8.2<L>      06 Aug 2020 08:09  Phos  2.7     08-06  Mg     1.5     08-06    TPro  6.1  /  Alb  3.1<L>  /  TBili  1.3<H>  /  DBili  x   /  AST  10  /  ALT  6<L>  /  AlkPhos  82  08-06    PT/INR - ( 05 Aug 2020 16:51 )   PT: 14.9 sec;   INR: 1.27 ratio         PTT - ( 05 Aug 2020 16:51 )  PTT:22.7 sec  Urinalysis Basic - ( 05 Aug 2020 17:04 )    Color: Yellow / Appearance: Slightly Turbid / S.023 / pH: x  Gluc: x / Ketone: Small  / Bili: Small / Urobili: 2 mg/dL   Blood: x / Protein: 100 / Nitrite: Negative   Leuk Esterase: Negative / RBC: 2 /hpf / WBC 4 /HPF   Sq Epi: x / Non Sq Epi: 2 /hpf / Bacteria: Negative

## 2020-08-07 ENCOUNTER — TRANSCRIPTION ENCOUNTER (OUTPATIENT)
Age: 55
End: 2020-08-07

## 2020-08-07 LAB
ALBUMIN SERPL ELPH-MCNC: 3.1 G/DL — LOW (ref 3.3–5)
ALP SERPL-CCNC: 80 U/L — SIGNIFICANT CHANGE UP (ref 40–120)
ALT FLD-CCNC: <5 U/L — LOW (ref 10–45)
AMYLASE P1 CFR SERPL: 138 U/L — HIGH (ref 25–125)
ANION GAP SERPL CALC-SCNC: 11 MMOL/L — SIGNIFICANT CHANGE UP (ref 5–17)
AST SERPL-CCNC: 11 U/L — SIGNIFICANT CHANGE UP (ref 10–40)
BILIRUB DIRECT SERPL-MCNC: 0.2 MG/DL — SIGNIFICANT CHANGE UP (ref 0–0.2)
BILIRUB INDIRECT FLD-MCNC: 0.6 MG/DL — SIGNIFICANT CHANGE UP (ref 0.2–1)
BILIRUB SERPL-MCNC: 0.8 MG/DL — SIGNIFICANT CHANGE UP (ref 0.2–1.2)
BUN SERPL-MCNC: 6 MG/DL — LOW (ref 7–23)
CALCIUM SERPL-MCNC: 8.8 MG/DL — SIGNIFICANT CHANGE UP (ref 8.4–10.5)
CHLORIDE SERPL-SCNC: 100 MMOL/L — SIGNIFICANT CHANGE UP (ref 96–108)
CO2 SERPL-SCNC: 26 MMOL/L — SIGNIFICANT CHANGE UP (ref 22–31)
CREAT SERPL-MCNC: 0.68 MG/DL — SIGNIFICANT CHANGE UP (ref 0.5–1.3)
GLUCOSE SERPL-MCNC: 105 MG/DL — HIGH (ref 70–99)
HCT VFR BLD CALC: 30.4 % — LOW (ref 39–50)
HGB BLD-MCNC: 9.9 G/DL — LOW (ref 13–17)
MAGNESIUM SERPL-MCNC: 1.9 MG/DL — SIGNIFICANT CHANGE UP (ref 1.6–2.6)
MCHC RBC-ENTMCNC: 31.9 PG — SIGNIFICANT CHANGE UP (ref 27–34)
MCHC RBC-ENTMCNC: 32.6 GM/DL — SIGNIFICANT CHANGE UP (ref 32–36)
MCV RBC AUTO: 98.1 FL — SIGNIFICANT CHANGE UP (ref 80–100)
NRBC # BLD: 0 /100 WBCS — SIGNIFICANT CHANGE UP (ref 0–0)
PHOSPHATE SERPL-MCNC: 1.8 MG/DL — LOW (ref 2.5–4.5)
PHOSPHATE SERPL-MCNC: 2.7 MG/DL — SIGNIFICANT CHANGE UP (ref 2.5–4.5)
PLATELET # BLD AUTO: 218 K/UL — SIGNIFICANT CHANGE UP (ref 150–400)
POTASSIUM SERPL-MCNC: 4.1 MMOL/L — SIGNIFICANT CHANGE UP (ref 3.5–5.3)
POTASSIUM SERPL-SCNC: 4.1 MMOL/L — SIGNIFICANT CHANGE UP (ref 3.5–5.3)
PROT SERPL-MCNC: 5.9 G/DL — LOW (ref 6–8.3)
RBC # BLD: 3.1 M/UL — LOW (ref 4.2–5.8)
RBC # FLD: 16.8 % — HIGH (ref 10.3–14.5)
SODIUM SERPL-SCNC: 137 MMOL/L — SIGNIFICANT CHANGE UP (ref 135–145)
WBC # BLD: 4.88 K/UL — SIGNIFICANT CHANGE UP (ref 3.8–10.5)
WBC # FLD AUTO: 4.88 K/UL — SIGNIFICANT CHANGE UP (ref 3.8–10.5)

## 2020-08-07 RX ORDER — HYDROMORPHONE HYDROCHLORIDE 2 MG/ML
4 INJECTION INTRAMUSCULAR; INTRAVENOUS; SUBCUTANEOUS EVERY 4 HOURS
Refills: 0 | Status: DISCONTINUED | OUTPATIENT
Start: 2020-08-07 | End: 2020-08-09

## 2020-08-07 RX ORDER — MAGNESIUM SULFATE 500 MG/ML
1 VIAL (ML) INJECTION DAILY
Refills: 0 | Status: DISCONTINUED | OUTPATIENT
Start: 2020-08-07 | End: 2020-08-07

## 2020-08-07 RX ORDER — PANTOPRAZOLE SODIUM 20 MG/1
40 TABLET, DELAYED RELEASE ORAL
Refills: 0 | Status: DISCONTINUED | OUTPATIENT
Start: 2020-08-08 | End: 2020-08-10

## 2020-08-07 RX ORDER — SENNA PLUS 8.6 MG/1
2 TABLET ORAL AT BEDTIME
Refills: 0 | Status: DISCONTINUED | OUTPATIENT
Start: 2020-08-07 | End: 2020-08-10

## 2020-08-07 RX ORDER — MAGNESIUM SULFATE 500 MG/ML
1 VIAL (ML) INJECTION ONCE
Refills: 0 | Status: COMPLETED | OUTPATIENT
Start: 2020-08-07 | End: 2020-08-07

## 2020-08-07 RX ORDER — LEVOTHYROXINE SODIUM 125 MCG
150 TABLET ORAL DAILY
Refills: 0 | Status: DISCONTINUED | OUTPATIENT
Start: 2020-08-07 | End: 2020-08-10

## 2020-08-07 RX ORDER — HYDROMORPHONE HYDROCHLORIDE 2 MG/ML
2 INJECTION INTRAMUSCULAR; INTRAVENOUS; SUBCUTANEOUS EVERY 4 HOURS
Refills: 0 | Status: DISCONTINUED | OUTPATIENT
Start: 2020-08-07 | End: 2020-08-07

## 2020-08-07 RX ORDER — HYDROMORPHONE HYDROCHLORIDE 2 MG/ML
1 INJECTION INTRAMUSCULAR; INTRAVENOUS; SUBCUTANEOUS
Qty: 8 | Refills: 0
Start: 2020-08-07

## 2020-08-07 RX ORDER — POLYETHYLENE GLYCOL 3350 17 G/17G
17 POWDER, FOR SOLUTION ORAL DAILY
Refills: 0 | Status: DISCONTINUED | OUTPATIENT
Start: 2020-08-07 | End: 2020-08-10

## 2020-08-07 RX ADMIN — HYDROMORPHONE HYDROCHLORIDE 1 MILLIGRAM(S): 2 INJECTION INTRAMUSCULAR; INTRAVENOUS; SUBCUTANEOUS at 06:53

## 2020-08-07 RX ADMIN — Medication 85 MILLIMOLE(S): at 06:41

## 2020-08-07 RX ADMIN — DEXTROSE MONOHYDRATE, SODIUM CHLORIDE, AND POTASSIUM CHLORIDE 100 MILLILITER(S): 50; .745; 4.5 INJECTION, SOLUTION INTRAVENOUS at 13:03

## 2020-08-07 RX ADMIN — HYDROMORPHONE HYDROCHLORIDE 4 MILLIGRAM(S): 2 INJECTION INTRAMUSCULAR; INTRAVENOUS; SUBCUTANEOUS at 20:06

## 2020-08-07 RX ADMIN — AMLODIPINE BESYLATE 10 MILLIGRAM(S): 2.5 TABLET ORAL at 05:40

## 2020-08-07 RX ADMIN — Medication 100 GRAM(S): at 05:53

## 2020-08-07 RX ADMIN — Medication 150 MICROGRAM(S): at 15:06

## 2020-08-07 RX ADMIN — HYDROMORPHONE HYDROCHLORIDE 4 MILLIGRAM(S): 2 INJECTION INTRAMUSCULAR; INTRAVENOUS; SUBCUTANEOUS at 19:36

## 2020-08-07 RX ADMIN — Medication 62.5 MILLIMOLE(S): at 17:12

## 2020-08-07 RX ADMIN — MEROPENEM 100 MILLIGRAM(S): 1 INJECTION INTRAVENOUS at 05:39

## 2020-08-07 RX ADMIN — ENOXAPARIN SODIUM 40 MILLIGRAM(S): 100 INJECTION SUBCUTANEOUS at 13:02

## 2020-08-07 RX ADMIN — SENNA PLUS 2 TABLET(S): 8.6 TABLET ORAL at 21:08

## 2020-08-07 RX ADMIN — PANTOPRAZOLE SODIUM 40 MILLIGRAM(S): 20 TABLET, DELAYED RELEASE ORAL at 13:03

## 2020-08-07 RX ADMIN — HYDROMORPHONE HYDROCHLORIDE 4 MILLIGRAM(S): 2 INJECTION INTRAMUSCULAR; INTRAVENOUS; SUBCUTANEOUS at 13:08

## 2020-08-07 RX ADMIN — MEROPENEM 100 MILLIGRAM(S): 1 INJECTION INTRAVENOUS at 13:03

## 2020-08-07 RX ADMIN — HYDROMORPHONE HYDROCHLORIDE 4 MILLIGRAM(S): 2 INJECTION INTRAMUSCULAR; INTRAVENOUS; SUBCUTANEOUS at 13:40

## 2020-08-07 RX ADMIN — HYDROMORPHONE HYDROCHLORIDE 1 MILLIGRAM(S): 2 INJECTION INTRAMUSCULAR; INTRAVENOUS; SUBCUTANEOUS at 05:44

## 2020-08-07 RX ADMIN — POLYETHYLENE GLYCOL 3350 17 GRAM(S): 17 POWDER, FOR SOLUTION ORAL at 13:03

## 2020-08-07 NOTE — DISCHARGE NOTE PROVIDER - NSDCCPCAREPLAN_GEN_ALL_CORE_FT
PRINCIPAL DISCHARGE DIAGNOSIS  Diagnosis: Pancreatitis  Assessment and Plan of Treatment: take pain medications as ordered.      SECONDARY DISCHARGE DIAGNOSES  Diagnosis: Pancreatitis  Assessment and Plan of Treatment:

## 2020-08-07 NOTE — DISCHARGE NOTE PROVIDER - HOSPITAL COURSE
Patient is a 54 year old male with a past medical history of GERD, PUD last had an endoscopy 5 weeks ago showing gastric ulcer, hypothyroidism, and PTSD, with past surgical history of alcoholic pancreatitis, admitted previously for pancreatitis in April 22, MRCP showed uncinate necrosis. Patient seen outpatient by Dr Duke, patient received EUS that was negative for any cancer. Patient complains of 5 days of diffuse sharp abdominal pain that is non radiating. Patient states that is happens when he eats food. He states that pain is similar to when he had pancreatitis in past. Patient received CT scan showing Interval development of peripancreatic inflammatory changes and multiple rim-enhancing fluid collections the largest of which is seen adjacent to the pancreatic tail/splenic hilum which measures 4.0 x 2.9 cm. Additional collection adjacent to the uncinate process the pancreas as well as within the spleen. These are felt to represent multiple abscesses, and are new since previous exam. In the ED patient was tachycardic, febrile, and WBC was WNL. Patient denies chest pain, SOB, dizziness and lightheadedness.        The patient was treated with pain medication, IV antibiotics , fluid resuscitation and made NPO for treatment of pancreatitis. MRCP was done which demonstrated hemorrhagic/necrotizing pancreatitis. A 2.4 cm pseudocyst in the uncinate process and a 4.7 cm walled off necrosis at the pancreatic tail. Splenic infarcts. No acute intervention necessary. On day of discharge, the patient was tolerating diet, ambulating well and pain controlled. Patient is a 54 year old male with a past medical history of GERD, PUD last had an endoscopy 5 weeks ago showing gastric ulcer, hypothyroidism, and PTSD, with past surgical history of alcoholic pancreatitis, admitted previously for pancreatitis in April 22, MRCP showed uncinate necrosis. Patient seen outpatient by Dr Duke, patient received EUS that was negative for any cancer. Patient complains of 5 days of diffuse sharp abdominal pain that is non radiating. Patient states that is happens when he eats food. He states that pain is similar to when he had pancreatitis in past.    CT scan showed Interval development of peripancreatic inflammatory changes and multiple rim-enhancing fluid collections the largest of which is seen adjacent to the pancreatic tail/splenic hilum which measures 4.0 x 2.9 cm. Additional collection adjacent to the uncinate process the pancreas as well as within the spleen. These are felt to represent multiple abscesses, and are new since previous exam. In the ED patient was tachycardic, febrile, and WBC was WNL. Patient denies chest pain, SOB, dizziness and lightheadedness.        The patient was treated with pain medication, IV antibiotics , fluid resuscitation and made NPO for treatment of pancreatitis. MRCP was done which demonstrated hemorrhagic/necrotizing pancreatitis. A 2.4 cm pseudocyst in the uncinate process and a 4.7 cm walled off necrosis at the pancreatic tail. Splenic infarcts. No acute intervention necessary. Abx discontiniued. On day of discharge, the patient was tolerating diet, ambulating well and pain controlled. Patient to follow up with Dr Duke as outpatient to discuss surgical options. Patient is a 54 year old male with a past medical history of GERD, PUD last had an endoscopy 5 weeks ago showing gastric ulcer, hypothyroidism, and PTSD, with past surgical history of alcoholic pancreatitis, admitted previously for pancreatitis in April 22, MRCP showed uncinate necrosis. Patient seen outpatient by Dr Duke, patient received EUS that was negative for any cancer. Patient complains of 5 days of diffuse sharp abdominal pain that is non radiating. Patient states that is happens when he eats food. He states that pain is similar to when he had pancreatitis in past.    CT scan showed Interval development of peripancreatic inflammatory changes and multiple rim-enhancing fluid collections the largest of which is seen adjacent to the pancreatic tail/splenic hilum which measures 4.0 x 2.9 cm. Additional collection adjacent to the uncinate process the pancreas as well as within the spleen. These are felt to represent multiple abscesses, and are new since previous exam. In the ED patient was tachycardic, febrile, and WBC was WNL. Patient denies chest pain, SOB, dizziness and lightheadedness.        The patient was treated with pain medication, IV antibiotics , fluid resuscitation and made NPO for treatment of pancreatitis. MRCP was done which demonstrated hemorrhagic/necrotizing pancreatitis. A 2.4 cm pseudocyst in the uncinate process and a 4.7 cm walled off necrosis at the pancreatic tail. Splenic infarcts. No acute intervention necessary. Abx discontiniued. On day of discharge, the patient was tolerating diet, ambulating well and pain controlled. Patient to follow up with Dr Duke as outpatient to discuss surgical options.  Blood + urine cx NGTD.

## 2020-08-07 NOTE — DISCHARGE NOTE PROVIDER - CARE PROVIDER_API CALL
Theo Duke  SURGERY  [address] [place] [address] [place] [address]  Phone: [phone]  Fax: [phone]  Follow Up Time: 2 weeks

## 2020-08-07 NOTE — PROGRESS NOTE ADULT - SUBJECTIVE AND OBJECTIVE BOX
BLUE SURGERY DAILY PROGRESS NOTE:       SUBJECTIVE/ROS: Pt seen and examined on morning rounds. Abdominal pain improved since admission, he denies N/V. Admits to flatus. Tolerating clear liquid diet.          MEDICATIONS  (STANDING):  amLODIPine   Tablet 10 milliGRAM(s) Oral daily  dextrose 5% + sodium chloride 0.45% with potassium chloride 20 mEq/L 1000 milliLiter(s) (100 mL/Hr) IV Continuous <Continuous>  enoxaparin Injectable 40 milliGRAM(s) SubCutaneous daily  levothyroxine Injectable 75 MICROGram(s) IV Push at bedtime  meropenem  IVPB 1000 milliGRAM(s) IV Intermittent every 8 hours  meropenem  IVPB      pantoprazole  Injectable 40 milliGRAM(s) IV Push daily  polyethylene glycol 3350 17 Gram(s) Oral daily  senna 2 Tablet(s) Oral at bedtime    MEDICATIONS  (PRN):  HYDROmorphone   Tablet 2 milliGRAM(s) Oral every 4 hours PRN Moderate Pain (4 - 6)  HYDROmorphone   Tablet 4 milliGRAM(s) Oral every 4 hours PRN Severe Pain (7 - 10)      OBJECTIVE:    Vital Signs Last 24 Hrs  T(C): 37.2 (07 Aug 2020 04:29), Max: 37.9 (06 Aug 2020 16:49)  T(F): 99 (07 Aug 2020 04:29), Max: 100.2 (06 Aug 2020 16:49)  HR: 84 (07 Aug 2020 04:29) (63 - 89)  BP: 120/53 (07 Aug 2020 04:29) (110/73 - 123/79)  BP(mean): --  RR: 18 (07 Aug 2020 04:29) (17 - 18)  SpO2: 92% (07 Aug 2020 04:29) (92% - 96%)        I&O's Detail    06 Aug 2020 07:01  -  07 Aug 2020 07:00  --------------------------------------------------------  IN:    dextrose 5% + sodium chloride 0.45% with potassium chloride 20 mEq/L: 2100 mL    lactated ringers.: 400 mL    Solution: 300 mL    Solution: 50 mL    Solution: 510 mL    Solution: 100 mL  Total IN: 3460 mL    OUT:    Voided: 1225 mL  Total OUT: 1225 mL    Total NET: 2235 mL          Daily     Daily     LABS:                        9.9    4.88  )-----------( 218      ( 07 Aug 2020 00:52 )             30.4     08-07    137  |  100  |  6<L>  ----------------------------<  105<H>  4.1   |  26  |  0.68    Ca    8.8      07 Aug 2020 00:52  Phos  1.8     08-07  Mg     1.9     08-07    TPro  5.9<L>  /  Alb  3.1<L>  /  TBili  0.8  /  DBili  0.2  /  AST  11  /  ALT  <5<L>  /  AlkPhos  80  08-07    PT/INR - ( 05 Aug 2020 16:51 )   PT: 14.9 sec;   INR: 1.27 ratio         PTT - ( 05 Aug 2020 16:51 )  PTT:22.7 sec  Urinalysis Basic - ( 05 Aug 2020 17:04 )    Color: Yellow / Appearance: Slightly Turbid / S.023 / pH: x  Gluc: x / Ketone: Small  / Bili: Small / Urobili: 2 mg/dL   Blood: x / Protein: 100 / Nitrite: Negative   Leuk Esterase: Negative / RBC: 2 /hpf / WBC 4 /HPF   Sq Epi: x / Non Sq Epi: 2 /hpf / Bacteria: Negative                PHYSICAL EXAM:  Constitutional:  NAD  Respiratory: non- labored breathing  Cardiovascular: RRR  Gastrointestinal: abdomen soft, nontender, nondistended. No obvious masses  Extremities: FROM, warm

## 2020-08-07 NOTE — DISCHARGE NOTE PROVIDER - NSDCCAREPROVSEEN_GEN_ALL_CORE_FT
Theo Duke Theo Duke   SURGERY  [address] [place] [address] [place] [address]  Phone: [phone]  Fax: [phone]

## 2020-08-07 NOTE — DISCHARGE NOTE PROVIDER - NSDCFUADDINST_GEN_ALL_CORE_FT
If symptoms worsen please call your doctor or return to the ER    Please see your primary care doctor in 1 week. If symptoms worsen please call your doctor or return to the ER    Please follow up with Dr Duke as outpatient to discuss surgical options.      Please see your primary care doctor in 1 week.

## 2020-08-07 NOTE — DISCHARGE NOTE PROVIDER - NSDCMRMEDTOKEN_GEN_ALL_CORE_FT
amLODIPine 10 mg oral tablet: 1 tab(s) orally once a day  levothyroxine 150 mcg (0.15 mg) oral tablet: 1 tab(s) orally once a day  pantoprazole 40 mg intravenous injection: 40 milligram(s) intravenous once a day amLODIPine 10 mg oral tablet: 1 tab(s) orally once a day  HYDROmorphone 2 mg oral tablet: 1 tab(s) orally every 4 hours, As needed, Moderate Pain (4 - 6) MDD:4  levothyroxine 150 mcg (0.15 mg) oral tablet: 1 tab(s) orally once a day  pantoprazole 40 mg intravenous injection: 40 milligram(s) intravenous once a day amLODIPine 10 mg oral tablet: 1 tab(s) orally once a day  HYDROmorphone 2 mg oral tablet: 1 tab(s) orally every 4 hours, As needed, Moderate Pain (4 - 6) MDD:4  levothyroxine 150 mcg (0.15 mg) oral tablet: 1 tab(s) orally once a day amLODIPine 10 mg oral tablet: 1 tab(s) orally once a day  HYDROmorphone 2 mg oral tablet: 1 tab(s) orally every 4 hours, As needed, Moderate Pain (4 - 6) MDD:4  levothyroxine 150 mcg (0.15 mg) oral tablet: 1 tab(s) orally once a day  megestrol 40 mg/mL oral suspension: 20 milliliter(s) orally once a day

## 2020-08-08 LAB
ALBUMIN SERPL ELPH-MCNC: 3.4 G/DL — SIGNIFICANT CHANGE UP (ref 3.3–5)
ALP SERPL-CCNC: 87 U/L — SIGNIFICANT CHANGE UP (ref 40–120)
ALT FLD-CCNC: 6 U/L — LOW (ref 10–45)
ANION GAP SERPL CALC-SCNC: 13 MMOL/L — SIGNIFICANT CHANGE UP (ref 5–17)
AST SERPL-CCNC: 11 U/L — SIGNIFICANT CHANGE UP (ref 10–40)
BILIRUB DIRECT SERPL-MCNC: <0.1 MG/DL — SIGNIFICANT CHANGE UP (ref 0–0.2)
BILIRUB INDIRECT FLD-MCNC: >0.3 MG/DL — SIGNIFICANT CHANGE UP (ref 0.2–1)
BILIRUB SERPL-MCNC: 0.4 MG/DL — SIGNIFICANT CHANGE UP (ref 0.2–1.2)
BUN SERPL-MCNC: 6 MG/DL — LOW (ref 7–23)
CALCIUM SERPL-MCNC: 9.5 MG/DL — SIGNIFICANT CHANGE UP (ref 8.4–10.5)
CHLORIDE SERPL-SCNC: 98 MMOL/L — SIGNIFICANT CHANGE UP (ref 96–108)
CO2 SERPL-SCNC: 25 MMOL/L — SIGNIFICANT CHANGE UP (ref 22–31)
CREAT SERPL-MCNC: 0.73 MG/DL — SIGNIFICANT CHANGE UP (ref 0.5–1.3)
GLUCOSE SERPL-MCNC: 113 MG/DL — HIGH (ref 70–99)
HCT VFR BLD CALC: 33.6 % — LOW (ref 39–50)
HGB BLD-MCNC: 10.9 G/DL — LOW (ref 13–17)
MAGNESIUM SERPL-MCNC: 2.1 MG/DL — SIGNIFICANT CHANGE UP (ref 1.6–2.6)
MCHC RBC-ENTMCNC: 31.4 PG — SIGNIFICANT CHANGE UP (ref 27–34)
MCHC RBC-ENTMCNC: 32.4 GM/DL — SIGNIFICANT CHANGE UP (ref 32–36)
MCV RBC AUTO: 96.8 FL — SIGNIFICANT CHANGE UP (ref 80–100)
NRBC # BLD: 0 /100 WBCS — SIGNIFICANT CHANGE UP (ref 0–0)
PHOSPHATE SERPL-MCNC: 2.2 MG/DL — LOW (ref 2.5–4.5)
PLATELET # BLD AUTO: 272 K/UL — SIGNIFICANT CHANGE UP (ref 150–400)
POTASSIUM SERPL-MCNC: 4.5 MMOL/L — SIGNIFICANT CHANGE UP (ref 3.5–5.3)
POTASSIUM SERPL-SCNC: 4.5 MMOL/L — SIGNIFICANT CHANGE UP (ref 3.5–5.3)
PROT SERPL-MCNC: 6.8 G/DL — SIGNIFICANT CHANGE UP (ref 6–8.3)
RBC # BLD: 3.47 M/UL — LOW (ref 4.2–5.8)
RBC # FLD: 16.2 % — HIGH (ref 10.3–14.5)
SODIUM SERPL-SCNC: 136 MMOL/L — SIGNIFICANT CHANGE UP (ref 135–145)
WBC # BLD: 5.61 K/UL — SIGNIFICANT CHANGE UP (ref 3.8–10.5)
WBC # FLD AUTO: 5.61 K/UL — SIGNIFICANT CHANGE UP (ref 3.8–10.5)

## 2020-08-08 RX ORDER — MEGESTROL ACETATE 40 MG/ML
800 SUSPENSION ORAL DAILY
Refills: 0 | Status: DISCONTINUED | OUTPATIENT
Start: 2020-08-08 | End: 2020-08-10

## 2020-08-08 RX ORDER — SODIUM,POTASSIUM PHOSPHATES 278-250MG
1 POWDER IN PACKET (EA) ORAL ONCE
Refills: 0 | Status: COMPLETED | OUTPATIENT
Start: 2020-08-08 | End: 2020-08-08

## 2020-08-08 RX ORDER — DEXTROSE MONOHYDRATE, SODIUM CHLORIDE, AND POTASSIUM CHLORIDE 50; .745; 4.5 G/1000ML; G/1000ML; G/1000ML
1000 INJECTION, SOLUTION INTRAVENOUS
Refills: 0 | Status: DISCONTINUED | OUTPATIENT
Start: 2020-08-08 | End: 2020-08-09

## 2020-08-08 RX ADMIN — AMLODIPINE BESYLATE 10 MILLIGRAM(S): 2.5 TABLET ORAL at 05:58

## 2020-08-08 RX ADMIN — HYDROMORPHONE HYDROCHLORIDE 4 MILLIGRAM(S): 2 INJECTION INTRAMUSCULAR; INTRAVENOUS; SUBCUTANEOUS at 21:45

## 2020-08-08 RX ADMIN — HYDROMORPHONE HYDROCHLORIDE 4 MILLIGRAM(S): 2 INJECTION INTRAMUSCULAR; INTRAVENOUS; SUBCUTANEOUS at 17:42

## 2020-08-08 RX ADMIN — POLYETHYLENE GLYCOL 3350 17 GRAM(S): 17 POWDER, FOR SOLUTION ORAL at 11:49

## 2020-08-08 RX ADMIN — PANTOPRAZOLE SODIUM 40 MILLIGRAM(S): 20 TABLET, DELAYED RELEASE ORAL at 05:58

## 2020-08-08 RX ADMIN — DEXTROSE MONOHYDRATE, SODIUM CHLORIDE, AND POTASSIUM CHLORIDE 75 MILLILITER(S): 50; .745; 4.5 INJECTION, SOLUTION INTRAVENOUS at 11:52

## 2020-08-08 RX ADMIN — Medication 150 MICROGRAM(S): at 05:58

## 2020-08-08 RX ADMIN — HYDROMORPHONE HYDROCHLORIDE 4 MILLIGRAM(S): 2 INJECTION INTRAMUSCULAR; INTRAVENOUS; SUBCUTANEOUS at 21:14

## 2020-08-08 RX ADMIN — Medication 1 PACKET(S): at 23:58

## 2020-08-08 RX ADMIN — HYDROMORPHONE HYDROCHLORIDE 4 MILLIGRAM(S): 2 INJECTION INTRAMUSCULAR; INTRAVENOUS; SUBCUTANEOUS at 02:02

## 2020-08-08 RX ADMIN — MEGESTROL ACETATE 800 MILLIGRAM(S): 40 SUSPENSION ORAL at 11:45

## 2020-08-08 RX ADMIN — SENNA PLUS 2 TABLET(S): 8.6 TABLET ORAL at 21:15

## 2020-08-08 RX ADMIN — ENOXAPARIN SODIUM 40 MILLIGRAM(S): 100 INJECTION SUBCUTANEOUS at 11:48

## 2020-08-08 RX ADMIN — HYDROMORPHONE HYDROCHLORIDE 4 MILLIGRAM(S): 2 INJECTION INTRAMUSCULAR; INTRAVENOUS; SUBCUTANEOUS at 02:35

## 2020-08-08 RX ADMIN — HYDROMORPHONE HYDROCHLORIDE 4 MILLIGRAM(S): 2 INJECTION INTRAMUSCULAR; INTRAVENOUS; SUBCUTANEOUS at 17:12

## 2020-08-08 NOTE — PROGRESS NOTE ADULT - SUBJECTIVE AND OBJECTIVE BOX
SUBJECTIVE:       OBJECTIVE: T(C): 36.8 (08-08-20 @ 01:54), Max: 37.4 (08-07-20 @ 21:26)  HR: 89 (08-08-20 @ 01:54) (86 - 91)  BP: 132/83 (08-08-20 @ 01:54) (110/71 - 134/88)  RR: 18 (08-08-20 @ 01:54) (18 - 18)  SpO2: 92% (08-08-20 @ 01:54) (91% - 93%)  Wt(kg): --  I&O's Summary    06 Aug 2020 07:01  -  07 Aug 2020 07:00  --------------------------------------------------------  IN: 3460 mL / OUT: 1225 mL / NET: 2235 mL    07 Aug 2020 07:01  -  08 Aug 2020 06:40  --------------------------------------------------------  IN: 1320 mL / OUT: 2950 mL / NET: -1630 mL      I&O's Detail    06 Aug 2020 07:01  -  07 Aug 2020 07:00  --------------------------------------------------------  IN:    dextrose 5% + sodium chloride 0.45% with potassium chloride 20 mEq/L: 2100 mL    lactated ringers.: 400 mL    Solution: 510 mL    Solution: 100 mL    Solution: 300 mL    Solution: 50 mL  Total IN: 3460 mL    OUT:    Voided: 1225 mL  Total OUT: 1225 mL    Total NET: 2235 mL      07 Aug 2020 07:01  -  08 Aug 2020 06:40  --------------------------------------------------------  IN:    Oral Fluid: 1020 mL    Solution: 50 mL    Solution: 250 mL  Total IN: 1320 mL    OUT:    Voided: 2950 mL  Total OUT: 2950 mL    Total NET: -1630 mL        General:  Abdomen:    MEDICATIONS  (STANDING):  amLODIPine   Tablet 10 milliGRAM(s) Oral daily  enoxaparin Injectable 40 milliGRAM(s) SubCutaneous daily  levothyroxine 150 MICROGram(s) Oral daily  megestrol Suspension 800 milliGRAM(s) Oral daily  pantoprazole    Tablet 40 milliGRAM(s) Oral before breakfast  polyethylene glycol 3350 17 Gram(s) Oral daily  senna 2 Tablet(s) Oral at bedtime    MEDICATIONS  (PRN):  HYDROmorphone   Tablet 2 milliGRAM(s) Oral every 4 hours PRN Moderate Pain (4 - 6)  HYDROmorphone   Tablet 4 milliGRAM(s) Oral every 4 hours PRN Severe Pain (7 - 10)      LABS:                        9.9    4.88  )-----------( 218      ( 07 Aug 2020 00:52 )             30.4     08-07    137  |  100  |  6<L>  ----------------------------<  105<H>  4.1   |  26  |  0.68    Ca    8.8      07 Aug 2020 00:52  Phos  2.7     08-07  Mg     1.9     08-07    TPro  5.9<L>  /  Alb  3.1<L>  /  TBili  0.8  /  DBili  0.2  /  AST  11  /  ALT  <5<L>  /  AlkPhos  80  08-07          RADIOLOGY & ADDITIONAL STUDIES:    Assessment/Plan: 54yMale s/p SUBJECTIVE:   No acute events overnight.   Denies nausea or vomiting, but still on liquids as far as of his diet.  Passing flatus and had a BM.     OBJECTIVE: T(C): 36.8 (08-08-20 @ 01:54), Max: 37.4 (08-07-20 @ 21:26)  HR: 89 (08-08-20 @ 01:54) (86 - 91)  BP: 132/83 (08-08-20 @ 01:54) (110/71 - 134/88)  RR: 18 (08-08-20 @ 01:54) (18 - 18)  SpO2: 92% (08-08-20 @ 01:54) (91% - 93%)  Wt(kg): --  I&O's Summary    06 Aug 2020 07:01  -  07 Aug 2020 07:00  --------------------------------------------------------  IN: 3460 mL / OUT: 1225 mL / NET: 2235 mL    07 Aug 2020 07:01  -  08 Aug 2020 06:40  --------------------------------------------------------  IN: 1320 mL / OUT: 2950 mL / NET: -1630 mL      I&O's Detail    06 Aug 2020 07:01  -  07 Aug 2020 07:00  --------------------------------------------------------  IN:    dextrose 5% + sodium chloride 0.45% with potassium chloride 20 mEq/L: 2100 mL    lactated ringers.: 400 mL    Solution: 510 mL    Solution: 100 mL    Solution: 300 mL    Solution: 50 mL  Total IN: 3460 mL    OUT:    Voided: 1225 mL  Total OUT: 1225 mL    Total NET: 2235 mL      07 Aug 2020 07:01  -  08 Aug 2020 06:40  --------------------------------------------------------  IN:    Oral Fluid: 1020 mL    Solution: 50 mL    Solution: 250 mL  Total IN: 1320 mL    OUT:    Voided: 2950 mL  Total OUT: 2950 mL    Total NET: -1630 mL        General: NAD, appears comfortable  Respiratory: nonlabored breathing, normal cw expansion  Abdomen: soft, nontender, nondistended  Vascular: warm and well perfused     MEDICATIONS  (STANDING):  amLODIPine   Tablet 10 milliGRAM(s) Oral daily  enoxaparin Injectable 40 milliGRAM(s) SubCutaneous daily  levothyroxine 150 MICROGram(s) Oral daily  megestrol Suspension 800 milliGRAM(s) Oral daily  pantoprazole    Tablet 40 milliGRAM(s) Oral before breakfast  polyethylene glycol 3350 17 Gram(s) Oral daily  senna 2 Tablet(s) Oral at bedtime    MEDICATIONS  (PRN):  HYDROmorphone   Tablet 2 milliGRAM(s) Oral every 4 hours PRN Moderate Pain (4 - 6)  HYDROmorphone   Tablet 4 milliGRAM(s) Oral every 4 hours PRN Severe Pain (7 - 10)      LABS:                        9.9    4.88  )-----------( 218      ( 07 Aug 2020 00:52 )             30.4     08-07    137  |  100  |  6<L>  ----------------------------<  105<H>  4.1   |  26  |  0.68    Ca    8.8      07 Aug 2020 00:52  Phos  2.7     08-07  Mg     1.9     08-07    TPro  5.9<L>  /  Alb  3.1<L>  /  TBili  0.8  /  DBili  0.2  /  AST  11  /  ALT  <5<L>  /  AlkPhos  80  08-07          RADIOLOGY & ADDITIONAL STUDIES:  < from: MR Abdomen w/wo IV Cont (08.06.20 @ 21:46) >  IMPRESSION:  Hemorrhagic/necrotizing pancreatitis.    A 2.4 cm pseudocyst in the uncinate process and a 4.7 cm walled off necrosis at the pancreatic tail.    Splenic infarcts.      Small ascites.    < end of copied text >    Assessment:  55 yo male with acute pancreatitis with multiple rim-enhancing fluid collections. MR revealed a 2.4cm pseudocyst in the uncinate process and a 4.7cm walled off necrosis in the pancreatic tail.    Plan:  - pain control  - Megace   - mIVF   - regular diet  - daily weights  - continue to monitor     Blue Sx [id number]

## 2020-08-09 LAB
ALBUMIN SERPL ELPH-MCNC: 3.5 G/DL — SIGNIFICANT CHANGE UP (ref 3.3–5)
ALP SERPL-CCNC: 83 U/L — SIGNIFICANT CHANGE UP (ref 40–120)
ALT FLD-CCNC: <5 U/L — LOW (ref 10–45)
ANION GAP SERPL CALC-SCNC: 14 MMOL/L — SIGNIFICANT CHANGE UP (ref 5–17)
AST SERPL-CCNC: 11 U/L — SIGNIFICANT CHANGE UP (ref 10–40)
BILIRUB DIRECT SERPL-MCNC: <0.1 MG/DL — SIGNIFICANT CHANGE UP (ref 0–0.2)
BILIRUB INDIRECT FLD-MCNC: >0.2 MG/DL — SIGNIFICANT CHANGE UP (ref 0.2–1)
BILIRUB SERPL-MCNC: 0.3 MG/DL — SIGNIFICANT CHANGE UP (ref 0.2–1.2)
BUN SERPL-MCNC: 6 MG/DL — LOW (ref 7–23)
CALCIUM SERPL-MCNC: 9.5 MG/DL — SIGNIFICANT CHANGE UP (ref 8.4–10.5)
CHLORIDE SERPL-SCNC: 99 MMOL/L — SIGNIFICANT CHANGE UP (ref 96–108)
CO2 SERPL-SCNC: 23 MMOL/L — SIGNIFICANT CHANGE UP (ref 22–31)
CREAT SERPL-MCNC: 0.67 MG/DL — SIGNIFICANT CHANGE UP (ref 0.5–1.3)
CRP SERPL-MCNC: 3.93 MG/DL — HIGH (ref 0–0.4)
GLUCOSE SERPL-MCNC: 127 MG/DL — HIGH (ref 70–99)
HCT VFR BLD CALC: 33.3 % — LOW (ref 39–50)
HGB BLD-MCNC: 10.9 G/DL — LOW (ref 13–17)
MAGNESIUM SERPL-MCNC: 1.9 MG/DL — SIGNIFICANT CHANGE UP (ref 1.6–2.6)
MCHC RBC-ENTMCNC: 31.6 PG — SIGNIFICANT CHANGE UP (ref 27–34)
MCHC RBC-ENTMCNC: 32.7 GM/DL — SIGNIFICANT CHANGE UP (ref 32–36)
MCV RBC AUTO: 96.5 FL — SIGNIFICANT CHANGE UP (ref 80–100)
NRBC # BLD: 0 /100 WBCS — SIGNIFICANT CHANGE UP (ref 0–0)
PHOSPHATE SERPL-MCNC: 2.7 MG/DL — SIGNIFICANT CHANGE UP (ref 2.5–4.5)
PLATELET # BLD AUTO: 322 K/UL — SIGNIFICANT CHANGE UP (ref 150–400)
POTASSIUM SERPL-MCNC: 4.3 MMOL/L — SIGNIFICANT CHANGE UP (ref 3.5–5.3)
POTASSIUM SERPL-SCNC: 4.3 MMOL/L — SIGNIFICANT CHANGE UP (ref 3.5–5.3)
PROCALCITONIN SERPL-MCNC: 0.12 NG/ML — HIGH (ref 0.02–0.1)
PROT SERPL-MCNC: 6.7 G/DL — SIGNIFICANT CHANGE UP (ref 6–8.3)
RBC # BLD: 3.45 M/UL — LOW (ref 4.2–5.8)
RBC # FLD: 16.2 % — HIGH (ref 10.3–14.5)
SODIUM SERPL-SCNC: 136 MMOL/L — SIGNIFICANT CHANGE UP (ref 135–145)
WBC # BLD: 5.17 K/UL — SIGNIFICANT CHANGE UP (ref 3.8–10.5)
WBC # FLD AUTO: 5.17 K/UL — SIGNIFICANT CHANGE UP (ref 3.8–10.5)

## 2020-08-09 RX ADMIN — Medication 62.5 MILLIMOLE(S): at 23:19

## 2020-08-09 RX ADMIN — MEGESTROL ACETATE 800 MILLIGRAM(S): 40 SUSPENSION ORAL at 12:11

## 2020-08-09 RX ADMIN — AMLODIPINE BESYLATE 10 MILLIGRAM(S): 2.5 TABLET ORAL at 06:03

## 2020-08-09 RX ADMIN — SENNA PLUS 2 TABLET(S): 8.6 TABLET ORAL at 21:19

## 2020-08-09 RX ADMIN — HYDROMORPHONE HYDROCHLORIDE 4 MILLIGRAM(S): 2 INJECTION INTRAMUSCULAR; INTRAVENOUS; SUBCUTANEOUS at 06:02

## 2020-08-09 RX ADMIN — HYDROMORPHONE HYDROCHLORIDE 4 MILLIGRAM(S): 2 INJECTION INTRAMUSCULAR; INTRAVENOUS; SUBCUTANEOUS at 19:54

## 2020-08-09 RX ADMIN — HYDROMORPHONE HYDROCHLORIDE 4 MILLIGRAM(S): 2 INJECTION INTRAMUSCULAR; INTRAVENOUS; SUBCUTANEOUS at 01:47

## 2020-08-09 RX ADMIN — HYDROMORPHONE HYDROCHLORIDE 4 MILLIGRAM(S): 2 INJECTION INTRAMUSCULAR; INTRAVENOUS; SUBCUTANEOUS at 02:18

## 2020-08-09 RX ADMIN — Medication 150 MICROGRAM(S): at 06:03

## 2020-08-09 RX ADMIN — PANTOPRAZOLE SODIUM 40 MILLIGRAM(S): 20 TABLET, DELAYED RELEASE ORAL at 06:03

## 2020-08-09 RX ADMIN — HYDROMORPHONE HYDROCHLORIDE 4 MILLIGRAM(S): 2 INJECTION INTRAMUSCULAR; INTRAVENOUS; SUBCUTANEOUS at 14:33

## 2020-08-09 RX ADMIN — ENOXAPARIN SODIUM 40 MILLIGRAM(S): 100 INJECTION SUBCUTANEOUS at 12:11

## 2020-08-09 RX ADMIN — HYDROMORPHONE HYDROCHLORIDE 4 MILLIGRAM(S): 2 INJECTION INTRAMUSCULAR; INTRAVENOUS; SUBCUTANEOUS at 15:10

## 2020-08-09 RX ADMIN — HYDROMORPHONE HYDROCHLORIDE 4 MILLIGRAM(S): 2 INJECTION INTRAMUSCULAR; INTRAVENOUS; SUBCUTANEOUS at 10:26

## 2020-08-09 RX ADMIN — HYDROMORPHONE HYDROCHLORIDE 4 MILLIGRAM(S): 2 INJECTION INTRAMUSCULAR; INTRAVENOUS; SUBCUTANEOUS at 06:32

## 2020-08-09 RX ADMIN — POLYETHYLENE GLYCOL 3350 17 GRAM(S): 17 POWDER, FOR SOLUTION ORAL at 12:13

## 2020-08-09 RX ADMIN — DEXTROSE MONOHYDRATE, SODIUM CHLORIDE, AND POTASSIUM CHLORIDE 75 MILLILITER(S): 50; .745; 4.5 INJECTION, SOLUTION INTRAVENOUS at 02:04

## 2020-08-09 RX ADMIN — HYDROMORPHONE HYDROCHLORIDE 4 MILLIGRAM(S): 2 INJECTION INTRAMUSCULAR; INTRAVENOUS; SUBCUTANEOUS at 11:00

## 2020-08-09 RX ADMIN — HYDROMORPHONE HYDROCHLORIDE 4 MILLIGRAM(S): 2 INJECTION INTRAMUSCULAR; INTRAVENOUS; SUBCUTANEOUS at 19:23

## 2020-08-09 NOTE — PROGRESS NOTE ADULT - ASSESSMENT
55 yo male with acute pancreatitis with multiple rim-enhancing fluid collections     -MRCP for further imaging   -continue Abx for now     Blue Sx [id number]
55 yo male with acute pancreatitis with multiple rim-enhancing fluid collections     - MRCP done, await read    - continue Abx for now   - diet advanced to regular    Blue Sx [id number]
55 yo male with acute pancreatitis with multiple rim-enhancing fluid collections. MR revealed a 2.4cm pseudocyst in the uncinate process and a 4.7cm walled off necrosis in the pancreatic tail.    Plan:   - Pain control  - Megace  - Pt tolerating regular diet, Dc mIVF.  - F/u GI Function,   - Daily weight  - Continue vital monitoring including T. Hold IR drainage  - F/u with PM labs including CRP and Procalcitonin  - Encourage OOB and ambulating as tolerated.

## 2020-08-09 NOTE — PROGRESS NOTE ADULT - SUBJECTIVE AND OBJECTIVE BOX
Patient seen and examined at bedside. Pleasant and cooperative with no reported issues overnight. Pt is afebrile, c/o hiccups and ageusia but overall tolerating regular diet without Nausea or Vomiting, Diarrhea, or abdominal pain.   According to pt, his pre-sickness wt was 81kg, compared to this AM wt of 77.9 kg.    Physical Exam  General: NAD, AAOx3, and appears comfortable  Respiratory: Patent airways, non-labored breathing.  Abdominal: S, ND, NT or rebound  Extremities: No LE edema.    Vital Signs Last 24 Hrs  T(C): 36.7 (09 Aug 2020 09:05), Max: 37 (08 Aug 2020 13:22)  T(F): 98 (09 Aug 2020 09:05), Max: 98.6 (08 Aug 2020 13:22)  HR: 81 (09 Aug 2020 09:05) (70 - 98)  BP: 126/83 (09 Aug 2020 09:05) (117/77 - 133/82)  BP(mean): --  RR: 18 (09 Aug 2020 09:05) (18 - 18)  SpO2: 93% (09 Aug 2020 09:05) (92% - 94%)    I&O's Detail    08 Aug 2020 07:01  -  09 Aug 2020 07:00  --------------------------------------------------------  IN:    Oral Fluid: 1100 mL    sodium chloride 0.45% with potassium chloride 20 mEq/L: 900 mL  Total IN: 2000 mL    OUT:    Voided: 2200 mL  Total OUT: 2200 mL    Total NET: -200 mL      09 Aug 2020 07:01  -  09 Aug 2020 09:54  --------------------------------------------------------  IN:    Oral Fluid: 280 mL  Total IN: 280 mL    OUT:  Total OUT: 0 mL    Total NET: 280 mL          08-08    136  |  98  |  6<L>  ----------------------------<  113<H>  4.5   |  25  |  0.73    Ca    9.5      08 Aug 2020 22:52  Phos  2.2     08-08  Mg     2.1     08-08    TPro  6.8  /  Alb  3.4  /  TBili  0.4  /  DBili  <0.1  /  AST  11  /  ALT  6<L>  /  AlkPhos  87  08-08                            10.9   5.61  )-----------( 272      ( 08 Aug 2020 22:52 )             33.6         MEDICATIONS  (STANDING):  amLODIPine   Tablet 10 milliGRAM(s) Oral daily  enoxaparin Injectable 40 milliGRAM(s) SubCutaneous daily  levothyroxine 150 MICROGram(s) Oral daily  megestrol Suspension 800 milliGRAM(s) Oral daily  pantoprazole    Tablet 40 milliGRAM(s) Oral before breakfast  polyethylene glycol 3350 17 Gram(s) Oral daily  senna 2 Tablet(s) Oral at bedtime    MEDICATIONS  (PRN):  HYDROmorphone   Tablet 2 milliGRAM(s) Oral every 4 hours PRN Moderate Pain (4 - 6)  HYDROmorphone   Tablet 4 milliGRAM(s) Oral every 4 hours PRN Severe Pain (7 - 10)

## 2020-08-10 ENCOUNTER — TRANSCRIPTION ENCOUNTER (OUTPATIENT)
Age: 55
End: 2020-08-10

## 2020-08-10 VITALS
RESPIRATION RATE: 18 BRPM | TEMPERATURE: 98 F | DIASTOLIC BLOOD PRESSURE: 94 MMHG | OXYGEN SATURATION: 94 % | SYSTOLIC BLOOD PRESSURE: 152 MMHG | HEART RATE: 112 BPM

## 2020-08-10 LAB
CULTURE RESULTS: SIGNIFICANT CHANGE UP
SPECIMEN SOURCE: SIGNIFICANT CHANGE UP

## 2020-08-10 PROCEDURE — 74177 CT ABD & PELVIS W/CONTRAST: CPT

## 2020-08-10 PROCEDURE — 84443 ASSAY THYROID STIM HORMONE: CPT

## 2020-08-10 PROCEDURE — 83605 ASSAY OF LACTIC ACID: CPT

## 2020-08-10 PROCEDURE — 80048 BASIC METABOLIC PNL TOTAL CA: CPT

## 2020-08-10 PROCEDURE — 84145 PROCALCITONIN (PCT): CPT

## 2020-08-10 PROCEDURE — 84295 ASSAY OF SERUM SODIUM: CPT

## 2020-08-10 PROCEDURE — 85014 HEMATOCRIT: CPT

## 2020-08-10 PROCEDURE — 84100 ASSAY OF PHOSPHORUS: CPT

## 2020-08-10 PROCEDURE — 96376 TX/PRO/DX INJ SAME DRUG ADON: CPT | Mod: XU

## 2020-08-10 PROCEDURE — 80053 COMPREHEN METABOLIC PANEL: CPT

## 2020-08-10 PROCEDURE — 85027 COMPLETE CBC AUTOMATED: CPT

## 2020-08-10 PROCEDURE — 71260 CT THORAX DX C+: CPT

## 2020-08-10 PROCEDURE — 96375 TX/PRO/DX INJ NEW DRUG ADDON: CPT | Mod: XU

## 2020-08-10 PROCEDURE — 85610 PROTHROMBIN TIME: CPT

## 2020-08-10 PROCEDURE — 84132 ASSAY OF SERUM POTASSIUM: CPT

## 2020-08-10 PROCEDURE — 82150 ASSAY OF AMYLASE: CPT

## 2020-08-10 PROCEDURE — 81001 URINALYSIS AUTO W/SCOPE: CPT

## 2020-08-10 PROCEDURE — 71045 X-RAY EXAM CHEST 1 VIEW: CPT

## 2020-08-10 PROCEDURE — 96374 THER/PROPH/DIAG INJ IV PUSH: CPT | Mod: XU

## 2020-08-10 PROCEDURE — 86140 C-REACTIVE PROTEIN: CPT

## 2020-08-10 PROCEDURE — 82803 BLOOD GASES ANY COMBINATION: CPT

## 2020-08-10 PROCEDURE — 82330 ASSAY OF CALCIUM: CPT

## 2020-08-10 PROCEDURE — 93005 ELECTROCARDIOGRAM TRACING: CPT

## 2020-08-10 PROCEDURE — 82565 ASSAY OF CREATININE: CPT

## 2020-08-10 PROCEDURE — 82947 ASSAY GLUCOSE BLOOD QUANT: CPT

## 2020-08-10 PROCEDURE — 87040 BLOOD CULTURE FOR BACTERIA: CPT

## 2020-08-10 PROCEDURE — 82435 ASSAY OF BLOOD CHLORIDE: CPT

## 2020-08-10 PROCEDURE — 80076 HEPATIC FUNCTION PANEL: CPT

## 2020-08-10 PROCEDURE — 74183 MRI ABD W/O CNTR FLWD CNTR: CPT

## 2020-08-10 PROCEDURE — 83690 ASSAY OF LIPASE: CPT

## 2020-08-10 PROCEDURE — 83735 ASSAY OF MAGNESIUM: CPT

## 2020-08-10 PROCEDURE — 87086 URINE CULTURE/COLONY COUNT: CPT

## 2020-08-10 PROCEDURE — A9585: CPT

## 2020-08-10 PROCEDURE — 99285 EMERGENCY DEPT VISIT HI MDM: CPT | Mod: 25

## 2020-08-10 PROCEDURE — 85730 THROMBOPLASTIN TIME PARTIAL: CPT

## 2020-08-10 RX ORDER — MEGESTROL ACETATE 40 MG/ML
20 SUSPENSION ORAL
Qty: 600 | Refills: 1
Start: 2020-08-10 | End: 2020-09-08

## 2020-08-10 RX ORDER — MEGESTROL ACETATE 40 MG/ML
20 SUSPENSION ORAL
Qty: 0 | Refills: 0 | DISCHARGE
Start: 2020-08-10

## 2020-08-10 RX ORDER — HYDROMORPHONE HYDROCHLORIDE 2 MG/ML
4 INJECTION INTRAMUSCULAR; INTRAVENOUS; SUBCUTANEOUS ONCE
Refills: 0 | Status: DISCONTINUED | OUTPATIENT
Start: 2020-08-10 | End: 2020-08-10

## 2020-08-10 RX ORDER — ENOXAPARIN SODIUM 100 MG/ML
40 INJECTION SUBCUTANEOUS DAILY
Refills: 0 | Status: DISCONTINUED | OUTPATIENT
Start: 2020-08-10 | End: 2020-08-10

## 2020-08-10 RX ADMIN — HYDROMORPHONE HYDROCHLORIDE 4 MILLIGRAM(S): 2 INJECTION INTRAMUSCULAR; INTRAVENOUS; SUBCUTANEOUS at 12:16

## 2020-08-10 RX ADMIN — AMLODIPINE BESYLATE 10 MILLIGRAM(S): 2.5 TABLET ORAL at 05:00

## 2020-08-10 RX ADMIN — PANTOPRAZOLE SODIUM 40 MILLIGRAM(S): 20 TABLET, DELAYED RELEASE ORAL at 05:00

## 2020-08-10 RX ADMIN — Medication 150 MICROGRAM(S): at 05:00

## 2020-08-10 NOTE — PROGRESS NOTE ADULT - SUBJECTIVE AND OBJECTIVE BOX
SUBJECTIVE:       OBJECTIVE: T(C): 37.2 (08-10-20 @ 05:03), Max: 37.2 (08-10-20 @ 05:03)  HR: 73 (08-10-20 @ 05:03) (73 - 90)  BP: 130/82 (08-10-20 @ 05:03) (102/67 - 130/82)  RR: 18 (08-10-20 @ 05:03) (18 - 18)  SpO2: 94% (08-10-20 @ 05:03) (93% - 94%)  Wt(kg): --  I&O's Summary    09 Aug 2020 07:01  -  10 Aug 2020 07:00  --------------------------------------------------------  IN: 2100 mL / OUT: 2050 mL / NET: 50 mL      I&O's Detail    09 Aug 2020 07:01  -  10 Aug 2020 07:00  --------------------------------------------------------  IN:    Oral Fluid: 1700 mL    sodium chloride 0.45% with potassium chloride 20 mEq/L: 150 mL    Solution: 250 mL  Total IN: 2100 mL    OUT:    Voided: 2050 mL  Total OUT: 2050 mL    Total NET: 50 mL        General:  Abdomen:    MEDICATIONS  (STANDING):  amLODIPine   Tablet 10 milliGRAM(s) Oral daily  enoxaparin Injectable 40 milliGRAM(s) SubCutaneous daily  levothyroxine 150 MICROGram(s) Oral daily  megestrol Suspension 800 milliGRAM(s) Oral daily  pantoprazole    Tablet 40 milliGRAM(s) Oral before breakfast  polyethylene glycol 3350 17 Gram(s) Oral daily  senna 2 Tablet(s) Oral at bedtime    MEDICATIONS  (PRN):  HYDROmorphone   Tablet 2 milliGRAM(s) Oral every 4 hours PRN Moderate Pain (4 - 6)  HYDROmorphone   Tablet 4 milliGRAM(s) Oral every 4 hours PRN Severe Pain (7 - 10)      LABS:                        10.9   5.17  )-----------( 322      ( 09 Aug 2020 21:46 )             33.3     08-09    136  |  99  |  6<L>  ----------------------------<  127<H>  4.3   |  23  |  0.67    Ca    9.5      09 Aug 2020 21:46  Phos  2.7     08-09  Mg     1.9     08-09    TPro  6.7  /  Alb  3.5  /  TBili  0.3  /  DBili  <0.1  /  AST  11  /  ALT  <5<L>  /  AlkPhos  83  08-09          RADIOLOGY & ADDITIONAL STUDIES:    Assessment/Plan: 54yMale s/p SUBJECTIVE:   No acute events overnight.   Pain well controlled. Denies nausea, vomiting, chest pain, shortness of breath.  Tolerating a regular diet. Voiding. Ambulating.  Passing flatus, and regular BM.     OBJECTIVE: T(C): 37.2 (08-10-20 @ 05:03), Max: 37.2 (08-10-20 @ 05:03)  HR: 73 (08-10-20 @ 05:03) (73 - 90)  BP: 130/82 (08-10-20 @ 05:03) (102/67 - 130/82)  RR: 18 (08-10-20 @ 05:03) (18 - 18)  SpO2: 94% (08-10-20 @ 05:03) (93% - 94%)  Wt(kg): --  I&O's Summary    09 Aug 2020 07:01  -  10 Aug 2020 07:00  --------------------------------------------------------  IN: 2100 mL / OUT: 2050 mL / NET: 50 mL      I&O's Detail    09 Aug 2020 07:01  -  10 Aug 2020 07:00  --------------------------------------------------------  IN:    Oral Fluid: 1700 mL    sodium chloride 0.45% with potassium chloride 20 mEq/L: 150 mL    Solution: 250 mL  Total IN: 2100 mL    OUT:    Voided: 2050 mL  Total OUT: 2050 mL    Total NET: 50 mL        General: NAD, appears comfortable  Respiratory: nonlabored breathing, normal cw expansion  Abdomen: soft, nontender, nondistended    MEDICATIONS  (STANDING):  amLODIPine   Tablet 10 milliGRAM(s) Oral daily  enoxaparin Injectable 40 milliGRAM(s) SubCutaneous daily  levothyroxine 150 MICROGram(s) Oral daily  megestrol Suspension 800 milliGRAM(s) Oral daily  pantoprazole    Tablet 40 milliGRAM(s) Oral before breakfast  polyethylene glycol 3350 17 Gram(s) Oral daily  senna 2 Tablet(s) Oral at bedtime    MEDICATIONS  (PRN):  HYDROmorphone   Tablet 2 milliGRAM(s) Oral every 4 hours PRN Moderate Pain (4 - 6)  HYDROmorphone   Tablet 4 milliGRAM(s) Oral every 4 hours PRN Severe Pain (7 - 10)      LABS:                        10.9   5.17  )-----------( 322      ( 09 Aug 2020 21:46 )             33.3     08-09    136  |  99  |  6<L>  ----------------------------<  127<H>  4.3   |  23  |  0.67    Ca    9.5      09 Aug 2020 21:46  Phos  2.7     08-09  Mg     1.9     08-09    TPro  6.7  /  Alb  3.5  /  TBili  0.3  /  DBili  <0.1  /  AST  11  /  ALT  <5<L>  /  AlkPhos  83  08-09          RADIOLOGY & ADDITIONAL STUDIES:  < from: MR Abdomen w/wo IV Cont (08.06.20 @ 21:46) >  IMPRESSION:  Hemorrhagic/necrotizing pancreatitis.    A 2.4 cm pseudocyst in the uncinate process and a 4.7 cm walled off necrosis at the pancreatic tail.    Splenic infarcts.      Small ascites.    < end of copied text >    Assessment: SUBJECTIVE:   No acute events overnight.   Pain well controlled. Denies nausea, vomiting, chest pain, shortness of breath.  Tolerating a regular diet. Voiding. Ambulating.  Passing flatus, and regular BM.     OBJECTIVE: T(C): 37.2 (08-10-20 @ 05:03), Max: 37.2 (08-10-20 @ 05:03)  HR: 73 (08-10-20 @ 05:03) (73 - 90)  BP: 130/82 (08-10-20 @ 05:03) (102/67 - 130/82)  RR: 18 (08-10-20 @ 05:03) (18 - 18)  SpO2: 94% (08-10-20 @ 05:03) (93% - 94%)  Wt(kg): --  I&O's Summary    09 Aug 2020 07:01  -  10 Aug 2020 07:00  --------------------------------------------------------  IN: 2100 mL / OUT: 2050 mL / NET: 50 mL      I&O's Detail    09 Aug 2020 07:01  -  10 Aug 2020 07:00  --------------------------------------------------------  IN:    Oral Fluid: 1700 mL    sodium chloride 0.45% with potassium chloride 20 mEq/L: 150 mL    Solution: 250 mL  Total IN: 2100 mL    OUT:    Voided: 2050 mL  Total OUT: 2050 mL    Total NET: 50 mL        General: NAD, appears comfortable  Respiratory: nonlabored breathing, normal cw expansion  Abdomen: soft, nontender, nondistended    MEDICATIONS  (STANDING):  amLODIPine   Tablet 10 milliGRAM(s) Oral daily  enoxaparin Injectable 40 milliGRAM(s) SubCutaneous daily  levothyroxine 150 MICROGram(s) Oral daily  megestrol Suspension 800 milliGRAM(s) Oral daily  pantoprazole    Tablet 40 milliGRAM(s) Oral before breakfast  polyethylene glycol 3350 17 Gram(s) Oral daily  senna 2 Tablet(s) Oral at bedtime    MEDICATIONS  (PRN):  HYDROmorphone   Tablet 2 milliGRAM(s) Oral every 4 hours PRN Moderate Pain (4 - 6)  HYDROmorphone   Tablet 4 milliGRAM(s) Oral every 4 hours PRN Severe Pain (7 - 10)      LABS:                        10.9   5.17  )-----------( 322      ( 09 Aug 2020 21:46 )             33.3     08-09    136  |  99  |  6<L>  ----------------------------<  127<H>  4.3   |  23  |  0.67    Ca    9.5      09 Aug 2020 21:46  Phos  2.7     08-09  Mg     1.9     08-09    TPro  6.7  /  Alb  3.5  /  TBili  0.3  /  DBili  <0.1  /  AST  11  /  ALT  <5<L>  /  AlkPhos  83  08-09          RADIOLOGY & ADDITIONAL STUDIES:  < from: MR Abdomen w/wo IV Cont (08.06.20 @ 21:46) >  IMPRESSION:  Hemorrhagic/necrotizing pancreatitis.    A 2.4 cm pseudocyst in the uncinate process and a 4.7 cm walled off necrosis at the pancreatic tail.    Splenic infarcts.      Small ascites.    < end of copied text >    Assessment:  55 yo male with acute pancreatitis with multiple rim-enhancing fluid collections. MR revealed a 2.4cm pseudocyst in the uncinate process and a 4.7cm walled off necrosis in the pancreatic tail.    Plan:   - Pain control  - encourage po t  - Megace  - Pt tolerating regular diet  - F/u GI Function  - Daily weight  - Continue vital monitoring including T. Hold IR drainage  - F/u with PM labs including CRP and Procalcitonin  - Encourage OOB and ambulating as tolerated. SUBJECTIVE:   No acute events overnight.   Pain well controlled. Denies nausea, vomiting, chest pain, shortness of breath.  Tolerating a regular diet. Voiding. Ambulating.  Passing flatus, and regular BM.     OBJECTIVE: T(C): 37.2 (08-10-20 @ 05:03), Max: 37.2 (08-10-20 @ 05:03)  HR: 73 (08-10-20 @ 05:03) (73 - 90)  BP: 130/82 (08-10-20 @ 05:03) (102/67 - 130/82)  RR: 18 (08-10-20 @ 05:03) (18 - 18)  SpO2: 94% (08-10-20 @ 05:03) (93% - 94%)  Wt(kg): --  I&O's Summary    09 Aug 2020 07:01  -  10 Aug 2020 07:00  --------------------------------------------------------  IN: 2100 mL / OUT: 2050 mL / NET: 50 mL      I&O's Detail    09 Aug 2020 07:01  -  10 Aug 2020 07:00  --------------------------------------------------------  IN:    Oral Fluid: 1700 mL    sodium chloride 0.45% with potassium chloride 20 mEq/L: 150 mL    Solution: 250 mL  Total IN: 2100 mL    OUT:    Voided: 2050 mL  Total OUT: 2050 mL    Total NET: 50 mL        General: NAD, appears comfortable  Respiratory: nonlabored breathing, normal cw expansion  Abdomen: soft, nontender, nondistended  Vascular: warm and well perfused     MEDICATIONS  (STANDING):  amLODIPine   Tablet 10 milliGRAM(s) Oral daily  enoxaparin Injectable 40 milliGRAM(s) SubCutaneous daily  levothyroxine 150 MICROGram(s) Oral daily  megestrol Suspension 800 milliGRAM(s) Oral daily  pantoprazole    Tablet 40 milliGRAM(s) Oral before breakfast  polyethylene glycol 3350 17 Gram(s) Oral daily  senna 2 Tablet(s) Oral at bedtime    MEDICATIONS  (PRN):  HYDROmorphone   Tablet 2 milliGRAM(s) Oral every 4 hours PRN Moderate Pain (4 - 6)  HYDROmorphone   Tablet 4 milliGRAM(s) Oral every 4 hours PRN Severe Pain (7 - 10)      LABS:                        10.9   5.17  )-----------( 322      ( 09 Aug 2020 21:46 )             33.3     08-09    136  |  99  |  6<L>  ----------------------------<  127<H>  4.3   |  23  |  0.67    Ca    9.5      09 Aug 2020 21:46  Phos  2.7     08-09  Mg     1.9     08-09    TPro  6.7  /  Alb  3.5  /  TBili  0.3  /  DBili  <0.1  /  AST  11  /  ALT  <5<L>  /  AlkPhos  83  08-09          RADIOLOGY & ADDITIONAL STUDIES:  < from: MR Abdomen w/wo IV Cont (08.06.20 @ 21:46) >  IMPRESSION:  Hemorrhagic/necrotizing pancreatitis.    A 2.4 cm pseudocyst in the uncinate process and a 4.7 cm walled off necrosis at the pancreatic tail.    Splenic infarcts.      Small ascites.    < end of copied text >    Assessment:  53 yo male with acute pancreatitis with multiple rim-enhancing fluid collections. MR revealed a 2.4cm pseudocyst in the uncinate process and a 4.7cm walled off necrosis in the pancreatic tail. CRP and ProCalcitonin mildly elevated.     Plan:   - Pain control  - encourage po intake, on Megace  - Pt tolerating regular diet  - Daily weight  - OOB/Ambulate  - FU as outpatient to discuss surgical options    [facility]  [id number] SUBJECTIVE:   No acute events overnight.   Pain well controlled. Denies nausea, vomiting, chest pain, shortness of breath.  Tolerating a regular diet. Voiding. Ambulating.  Passing flatus, and regular BM.     OBJECTIVE: T(C): 37.2 (08-10-20 @ 05:03), Max: 37.2 (08-10-20 @ 05:03)  HR: 73 (08-10-20 @ 05:03) (73 - 90)  BP: 130/82 (08-10-20 @ 05:03) (102/67 - 130/82)  RR: 18 (08-10-20 @ 05:03) (18 - 18)  SpO2: 94% (08-10-20 @ 05:03) (93% - 94%)  Wt(kg): --  I&O's Summary    09 Aug 2020 07:01  -  10 Aug 2020 07:00  --------------------------------------------------------  IN: 2100 mL / OUT: 2050 mL / NET: 50 mL      I&O's Detail    09 Aug 2020 07:01  -  10 Aug 2020 07:00  --------------------------------------------------------  IN:    Oral Fluid: 1700 mL    sodium chloride 0.45% with potassium chloride 20 mEq/L: 150 mL    Solution: 250 mL  Total IN: 2100 mL    OUT:    Voided: 2050 mL  Total OUT: 2050 mL    Total NET: 50 mL        General: NAD, appears comfortable  Respiratory: nonlabored breathing, normal cw expansion  Abdomen: soft, nontender, nondistended  Vascular: warm and well perfused     MEDICATIONS  (STANDING):  amLODIPine   Tablet 10 milliGRAM(s) Oral daily  enoxaparin Injectable 40 milliGRAM(s) SubCutaneous daily  levothyroxine 150 MICROGram(s) Oral daily  megestrol Suspension 800 milliGRAM(s) Oral daily  pantoprazole    Tablet 40 milliGRAM(s) Oral before breakfast  polyethylene glycol 3350 17 Gram(s) Oral daily  senna 2 Tablet(s) Oral at bedtime    MEDICATIONS  (PRN):  HYDROmorphone   Tablet 2 milliGRAM(s) Oral every 4 hours PRN Moderate Pain (4 - 6)  HYDROmorphone   Tablet 4 milliGRAM(s) Oral every 4 hours PRN Severe Pain (7 - 10)      LABS:                        10.9   5.17  )-----------( 322      ( 09 Aug 2020 21:46 )             33.3     08-09    136  |  99  |  6<L>  ----------------------------<  127<H>  4.3   |  23  |  0.67    Ca    9.5      09 Aug 2020 21:46  Phos  2.7     08-09  Mg     1.9     08-09    TPro  6.7  /  Alb  3.5  /  TBili  0.3  /  DBili  <0.1  /  AST  11  /  ALT  <5<L>  /  AlkPhos  83  08-09          RADIOLOGY & ADDITIONAL STUDIES:  < from: MR Abdomen w/wo IV Cont (08.06.20 @ 21:46) >  IMPRESSION:  Hemorrhagic/necrotizing pancreatitis.    A 2.4 cm pseudocyst in the uncinate process and a 4.7 cm walled off necrosis at the pancreatic tail.    Splenic infarcts.      Small ascites.    < end of copied text >    Assessment:  53 yo male with acute pancreatitis with necrotizing features vs. sepsis. MR revealed a 2.4cm pseudocyst in the uncinate process and a 4.7cm walled off necrosis in the pancreatic tail. CRP and ProCalcitonin mildly elevated. Sepsis resolved.      Plan:   - Pain control  - encourage po intake, on Megace  - Pt tolerating regular diet  - Daily weight  - OOB/Ambulate  - FU as outpatient to discuss surgical options    [facility]  [id number]

## 2020-08-10 NOTE — PROGRESS NOTE ADULT - REASON FOR ADMISSION
Acute pancreatitis and walled off necrosis

## 2020-08-10 NOTE — DISCHARGE NOTE NURSING/CASE MANAGEMENT/SOCIAL WORK - PATIENT PORTAL LINK FT
You can access the FollowMyHealth Patient Portal offered by [facility] by registering at the following website: [email or web address]. By joining [employer]’s FollowMyHealth portal, you will also be able to view your health information using other applications (apps) compatible with our system.

## 2020-08-11 LAB
CULTURE RESULTS: SIGNIFICANT CHANGE UP
SPECIMEN SOURCE: SIGNIFICANT CHANGE UP

## 2020-08-13 PROBLEM — K85.90 ACUTE PANCREATITIS WITHOUT NECROSIS OR INFECTION, UNSPECIFIED: Chronic | Status: ACTIVE | Noted: 2020-08-05

## 2020-08-13 PROBLEM — I10 ESSENTIAL (PRIMARY) HYPERTENSION: Chronic | Status: ACTIVE | Noted: 2020-08-05

## 2020-08-18 ENCOUNTER — APPOINTMENT (OUTPATIENT)
Dept: SURGERY | Facility: CLINIC | Age: 55
End: 2020-08-18
Payer: COMMERCIAL

## 2020-08-18 DIAGNOSIS — Z80.7 FAMILY HISTORY OF OTHER MALIGNANT NEOPLASMS OF LYMPHOID, HEMATOPOIETIC AND RELATED TISSUES: ICD-10-CM

## 2020-08-18 DIAGNOSIS — K85.91 ACUTE PANCREATITIS WITH UNINFECTED NECROSIS, UNSPECIFIED: ICD-10-CM

## 2020-08-18 DIAGNOSIS — K86.89 OTHER SPECIFIED DISEASES OF PANCREAS: ICD-10-CM

## 2020-08-18 PROCEDURE — 99213 OFFICE O/P EST LOW 20 MIN: CPT | Mod: 95

## 2020-08-19 PROBLEM — K85.91 NECROTIZING PANCREATITIS: Status: ACTIVE | Noted: 2020-08-18

## 2020-08-20 NOTE — REVIEW OF SYSTEMS
[Negative] : Heme/Lymph [FreeTextEntry2] : Weight loss 10 lbs in 1 month.  No fevers or malaise [FreeTextEntry8] : as per HPI.  Recent constipation, relieved with dulcolax dose.

## 2020-08-20 NOTE — REASON FOR VISIT
[Post Hospitalization] : a post hospitalization visit for [FreeTextEntry2] : s/p necrotizing pancreatitis

## 2020-08-20 NOTE — HISTORY OF PRESENT ILLNESS
[de-identified] : This visit was provided via telehealth using real-time 2-way audio visual technology. The patient, TREE NORWOOD, was located at home [address] [place] [address] at the time of the visit. This provider was located at the clinic in [place], [place] at the time of the visit. The provider, patient participated in the telehealth encounter.  Verbal consent was given Aug 18 2020 10:45AM by the patient.\par  \par \par Mr. TREE NORWOOD is a 54 year old man who presents for follow-up visit s/p acute necrotizing pancreatitis. He was recently admitted to [facility] from, 8/5/20- 8/10/20.  Prior to this recent episode, he had acute pancreatitis 4/2019 secondary to alcohol abuse.  There was a heterogenous area in the head of pancreas on CT/MRI. I had then referred patient for EUS with FNA, performed on 7/11/19 by Dr. Kay. There was no obvious pancreas head mass and the pathology was negative for malignancy. \par \par CT abdomen and pelvis 8/5/20 IMPRESSION: Acute pancreatitis. Interval development of peripancreatic inflammatory changes and multiple rim-enhancing fluid collections the largest of which is seen adjacent to the pancreatic tail/splenic hilum which measures 4.0 x 2.9 cm. Additional collection adjacent to the uncinate process the pancreas as well as within the spleen. These are felt to represent multiple abscesses, and are new since previous exam.   No evidence of free air.   Ascites. \par \par MRCP 8/6/20:  IMPRESSION: Hemorrhagic/necrotizing pancreatitis. A 2.4 cm pseudocyst in the uncinate process and a 4.7 cm walled off necrosis at the pancreatic tail. Splenic infarcts. Small ascites.\par \par Patient reports having persistent mid-epigastric abdominal pain with minimal relief from pain medication (dilaudid). His appetite is decreased and he lost about 10-15 since recent pancreatitis episode. No fevers or vomiting. \par

## 2020-08-20 NOTE — ADDENDUM
[FreeTextEntry1] : Contacted IR to schedule for peripancreatic drainage, however upon review by the radiologist- there is no window to reach collection.  Contacted Dr. Kay to see if EUS with AXIOS stent placement for drainage of collection can be performed. Awaiting his review with radiology to see if amenable.\par Patient notified on the above via telephone.

## 2020-08-20 NOTE — ASSESSMENT
[FreeTextEntry1] : s/p recent hospital stay at [facility] for abdominal pain, found to have  peripancreatic walled off necrosis on imaging. Pt symptomatic. Recent labs with normal WBC and no recent fevers. \par \par Plan:\par -Continue Megace for appetite/weight loss \par -Will send to IR for drainage of walled off necrosis, obtain cultures. \par -RTC after above \par

## 2020-08-22 ENCOUNTER — APPOINTMENT (OUTPATIENT)
Dept: DISASTER EMERGENCY | Facility: CLINIC | Age: 55
End: 2020-08-22

## 2020-08-22 DIAGNOSIS — Z01.818 ENCOUNTER FOR OTHER PREPROCEDURAL EXAMINATION: ICD-10-CM

## 2020-08-23 LAB — SARS-COV-2 N GENE NPH QL NAA+PROBE: NOT DETECTED

## 2020-08-25 ENCOUNTER — APPOINTMENT (OUTPATIENT)
Dept: SURGERY | Facility: CLINIC | Age: 55
End: 2020-08-25

## 2020-08-25 ENCOUNTER — APPOINTMENT (OUTPATIENT)
Dept: GASTROENTEROLOGY | Facility: HOSPITAL | Age: 55
End: 2020-08-25

## 2020-08-25 ENCOUNTER — OUTPATIENT (OUTPATIENT)
Dept: OUTPATIENT SERVICES | Facility: HOSPITAL | Age: 55
LOS: 1 days | End: 2020-08-25
Payer: COMMERCIAL

## 2020-08-25 VITALS
SYSTOLIC BLOOD PRESSURE: 135 MMHG | RESPIRATION RATE: 21 BRPM | HEART RATE: 83 BPM | OXYGEN SATURATION: 99 % | DIASTOLIC BLOOD PRESSURE: 81 MMHG | HEIGHT: 71 IN | WEIGHT: 173.94 LBS | TEMPERATURE: 97 F

## 2020-08-25 VITALS
DIASTOLIC BLOOD PRESSURE: 877 MMHG | OXYGEN SATURATION: 97 % | HEART RATE: 86 BPM | RESPIRATION RATE: 21 BRPM | SYSTOLIC BLOOD PRESSURE: 126 MMHG

## 2020-08-25 DIAGNOSIS — Z98.890 OTHER SPECIFIED POSTPROCEDURAL STATES: Chronic | ICD-10-CM

## 2020-08-25 DIAGNOSIS — K85.00 IDIOPATHIC ACUTE PANCREATITIS WITHOUT NECROSIS OR INFECTION: ICD-10-CM

## 2020-08-25 DIAGNOSIS — K86.9 DISEASE OF PANCREAS, UNSPECIFIED: ICD-10-CM

## 2020-08-25 PROCEDURE — 43259 EGD US EXAM DUODENUM/JEJUNUM: CPT | Mod: GC

## 2020-08-25 PROCEDURE — 43259 EGD US EXAM DUODENUM/JEJUNUM: CPT

## 2020-08-25 PROCEDURE — C9399: CPT

## 2020-08-25 PROCEDURE — C1874: CPT

## 2020-08-25 RX ORDER — SODIUM CHLORIDE 9 MG/ML
1000 INJECTION, SOLUTION INTRAVENOUS
Refills: 0 | Status: DISCONTINUED | OUTPATIENT
Start: 2020-08-25 | End: 2020-09-09

## 2020-08-25 NOTE — PRE PROCEDURE NOTE - PRE PROCEDURE EVALUATION
Attending Physician:   Rahul                         Procedure: EGD/EUS    Indication for Procedure: Pancreatic collection/WON  ________________________________________________________  PAST MEDICAL & SURGICAL HISTORY:  Pancreatitis  Hypertension  Hernia  Hypothyroid  No significant past surgical history    ALLERGIES:  No Known Allergies    HOME MEDICATIONS:  amLODIPine 10 mg oral tablet: 1 tab(s) orally once a day  megestrol 40 mg/mL oral suspension: 20 milliliter(s) orally once a day    AICD/PPM: [ ] yes   [x ] no    PERTINENT LAB DATA:                      PHYSICAL EXAMINATION:    T(C): --  HR: --  BP: --  RR: --  SpO2: --    Constitutional: NAD  HEENT: PERRLA, EOMI,    Neck:  No JVD  Respiratory: CTAB/L  Cardiovascular: S1 and S2  Gastrointestinal: BS+, soft, NT/ND  Extremities: No peripheral edema  Neurological: A/O x 3, no focal deficits  Psychiatric: Normal mood, normal affect  Skin: No rashes    ASA Class: I [ ]  II [ ]  III [ x]  IV [ ]    COMMENTS:    The patient is a suitable candidate for the planned procedure unless box checked [ ]  No, explain:

## 2020-09-15 ENCOUNTER — INPATIENT (INPATIENT)
Facility: HOSPITAL | Age: 55
LOS: 6 days | Discharge: HOME | DRG: 439 | End: 2020-09-22
Attending: SURGERY | Admitting: SURGERY
Payer: COMMERCIAL

## 2020-09-15 VITALS
HEIGHT: 71 IN | TEMPERATURE: 98 F | WEIGHT: 169.09 LBS | DIASTOLIC BLOOD PRESSURE: 92 MMHG | OXYGEN SATURATION: 98 % | HEART RATE: 117 BPM | SYSTOLIC BLOOD PRESSURE: 139 MMHG | RESPIRATION RATE: 20 BRPM

## 2020-09-15 DIAGNOSIS — Z98.890 OTHER SPECIFIED POSTPROCEDURAL STATES: Chronic | ICD-10-CM

## 2020-09-15 DIAGNOSIS — K85.90 ACUTE PANCREATITIS WITHOUT NECROSIS OR INFECTION, UNSPECIFIED: ICD-10-CM

## 2020-09-15 LAB
ALBUMIN SERPL ELPH-MCNC: 3.9 G/DL — SIGNIFICANT CHANGE UP (ref 3.3–5)
ALP SERPL-CCNC: 108 U/L — SIGNIFICANT CHANGE UP (ref 40–120)
ALT FLD-CCNC: 9 U/L — LOW (ref 10–45)
ANION GAP SERPL CALC-SCNC: 14 MMOL/L — SIGNIFICANT CHANGE UP (ref 5–17)
APTT BLD: 32.6 SEC — SIGNIFICANT CHANGE UP (ref 27.5–35.5)
AST SERPL-CCNC: 11 U/L — SIGNIFICANT CHANGE UP (ref 10–40)
BASOPHILS # BLD AUTO: 0.03 K/UL — SIGNIFICANT CHANGE UP (ref 0–0.2)
BASOPHILS NFR BLD AUTO: 0.3 % — SIGNIFICANT CHANGE UP (ref 0–2)
BILIRUB SERPL-MCNC: 0.8 MG/DL — SIGNIFICANT CHANGE UP (ref 0.2–1.2)
BLD GP AB SCN SERPL QL: NEGATIVE — SIGNIFICANT CHANGE UP
BUN SERPL-MCNC: 14 MG/DL — SIGNIFICANT CHANGE UP (ref 7–23)
CALCIUM SERPL-MCNC: 9.9 MG/DL — SIGNIFICANT CHANGE UP (ref 8.4–10.5)
CHLORIDE SERPL-SCNC: 98 MMOL/L — SIGNIFICANT CHANGE UP (ref 96–108)
CO2 SERPL-SCNC: 23 MMOL/L — SIGNIFICANT CHANGE UP (ref 22–31)
CREAT SERPL-MCNC: 0.91 MG/DL — SIGNIFICANT CHANGE UP (ref 0.5–1.3)
EOSINOPHIL # BLD AUTO: 0.08 K/UL — SIGNIFICANT CHANGE UP (ref 0–0.5)
EOSINOPHIL NFR BLD AUTO: 0.7 % — SIGNIFICANT CHANGE UP (ref 0–6)
GAS PNL BLDV: SIGNIFICANT CHANGE UP
GAS PNL BLDV: SIGNIFICANT CHANGE UP
GLUCOSE SERPL-MCNC: 129 MG/DL — HIGH (ref 70–99)
HCT VFR BLD CALC: 41.1 % — SIGNIFICANT CHANGE UP (ref 39–50)
HGB BLD-MCNC: 13.4 G/DL — SIGNIFICANT CHANGE UP (ref 13–17)
IMM GRANULOCYTES NFR BLD AUTO: 0.3 % — SIGNIFICANT CHANGE UP (ref 0–1.5)
INR BLD: 1.28 RATIO — HIGH (ref 0.88–1.16)
LYMPHOCYTES # BLD AUTO: 1.65 K/UL — SIGNIFICANT CHANGE UP (ref 1–3.3)
LYMPHOCYTES # BLD AUTO: 14.2 % — SIGNIFICANT CHANGE UP (ref 13–44)
MCHC RBC-ENTMCNC: 30.9 PG — SIGNIFICANT CHANGE UP (ref 27–34)
MCHC RBC-ENTMCNC: 32.6 GM/DL — SIGNIFICANT CHANGE UP (ref 32–36)
MCV RBC AUTO: 94.9 FL — SIGNIFICANT CHANGE UP (ref 80–100)
MONOCYTES # BLD AUTO: 0.65 K/UL — SIGNIFICANT CHANGE UP (ref 0–0.9)
MONOCYTES NFR BLD AUTO: 5.6 % — SIGNIFICANT CHANGE UP (ref 2–14)
NEUTROPHILS # BLD AUTO: 9.13 K/UL — HIGH (ref 1.8–7.4)
NEUTROPHILS NFR BLD AUTO: 78.9 % — HIGH (ref 43–77)
NRBC # BLD: 0 /100 WBCS — SIGNIFICANT CHANGE UP (ref 0–0)
PLATELET # BLD AUTO: 408 K/UL — HIGH (ref 150–400)
POTASSIUM SERPL-MCNC: 4.8 MMOL/L — SIGNIFICANT CHANGE UP (ref 3.5–5.3)
POTASSIUM SERPL-SCNC: 4.8 MMOL/L — SIGNIFICANT CHANGE UP (ref 3.5–5.3)
PROT SERPL-MCNC: 7.7 G/DL — SIGNIFICANT CHANGE UP (ref 6–8.3)
PROTHROM AB SERPL-ACNC: 15 SEC — HIGH (ref 10.6–13.6)
RBC # BLD: 4.33 M/UL — SIGNIFICANT CHANGE UP (ref 4.2–5.8)
RBC # FLD: 16 % — HIGH (ref 10.3–14.5)
RH IG SCN BLD-IMP: POSITIVE — SIGNIFICANT CHANGE UP
SARS-COV-2 RNA SPEC QL NAA+PROBE: SIGNIFICANT CHANGE UP
SODIUM SERPL-SCNC: 135 MMOL/L — SIGNIFICANT CHANGE UP (ref 135–145)
TSH SERPL-MCNC: 15.6 UIU/ML — HIGH (ref 0.27–4.2)
WBC # BLD: 11.58 K/UL — HIGH (ref 3.8–10.5)
WBC # FLD AUTO: 11.58 K/UL — HIGH (ref 3.8–10.5)

## 2020-09-15 PROCEDURE — 74177 CT ABD & PELVIS W/CONTRAST: CPT | Mod: 26

## 2020-09-15 PROCEDURE — 99285 EMERGENCY DEPT VISIT HI MDM: CPT

## 2020-09-15 PROCEDURE — 99232 SBSQ HOSP IP/OBS MODERATE 35: CPT | Mod: GC

## 2020-09-15 PROCEDURE — 93010 ELECTROCARDIOGRAM REPORT: CPT

## 2020-09-15 RX ORDER — AMLODIPINE BESYLATE 2.5 MG/1
10 TABLET ORAL DAILY
Refills: 0 | Status: DISCONTINUED | OUTPATIENT
Start: 2020-09-15 | End: 2020-09-16

## 2020-09-15 RX ORDER — MORPHINE SULFATE 50 MG/1
4 CAPSULE, EXTENDED RELEASE ORAL ONCE
Refills: 0 | Status: DISCONTINUED | OUTPATIENT
Start: 2020-09-15 | End: 2020-09-15

## 2020-09-15 RX ORDER — SODIUM CHLORIDE 9 MG/ML
1000 INJECTION, SOLUTION INTRAVENOUS
Refills: 0 | Status: DISCONTINUED | OUTPATIENT
Start: 2020-09-15 | End: 2020-09-18

## 2020-09-15 RX ORDER — HYDROMORPHONE HYDROCHLORIDE 2 MG/ML
1 INJECTION INTRAMUSCULAR; INTRAVENOUS; SUBCUTANEOUS EVERY 4 HOURS
Refills: 0 | Status: DISCONTINUED | OUTPATIENT
Start: 2020-09-15 | End: 2020-09-21

## 2020-09-15 RX ORDER — SODIUM CHLORIDE 9 MG/ML
1000 INJECTION INTRAMUSCULAR; INTRAVENOUS; SUBCUTANEOUS ONCE
Refills: 0 | Status: COMPLETED | OUTPATIENT
Start: 2020-09-15 | End: 2020-09-15

## 2020-09-15 RX ORDER — LEVOTHYROXINE SODIUM 125 MCG
150 TABLET ORAL DAILY
Refills: 0 | Status: DISCONTINUED | OUTPATIENT
Start: 2020-09-15 | End: 2020-09-22

## 2020-09-15 RX ORDER — INFLUENZA VIRUS VACCINE 15; 15; 15; 15 UG/.5ML; UG/.5ML; UG/.5ML; UG/.5ML
0.5 SUSPENSION INTRAMUSCULAR ONCE
Refills: 0 | Status: COMPLETED | OUTPATIENT
Start: 2020-09-15 | End: 2020-09-15

## 2020-09-15 RX ORDER — PIPERACILLIN AND TAZOBACTAM 4; .5 G/20ML; G/20ML
3.38 INJECTION, POWDER, LYOPHILIZED, FOR SOLUTION INTRAVENOUS ONCE
Refills: 0 | Status: COMPLETED | OUTPATIENT
Start: 2020-09-15 | End: 2020-09-15

## 2020-09-15 RX ORDER — ONDANSETRON 8 MG/1
4 TABLET, FILM COATED ORAL ONCE
Refills: 0 | Status: COMPLETED | OUTPATIENT
Start: 2020-09-15 | End: 2020-09-15

## 2020-09-15 RX ORDER — ENOXAPARIN SODIUM 100 MG/ML
40 INJECTION SUBCUTANEOUS ONCE
Refills: 0 | Status: COMPLETED | OUTPATIENT
Start: 2020-09-15 | End: 2020-09-15

## 2020-09-15 RX ORDER — ACETAMINOPHEN 500 MG
1000 TABLET ORAL EVERY 6 HOURS
Refills: 0 | Status: COMPLETED | OUTPATIENT
Start: 2020-09-15 | End: 2020-09-20

## 2020-09-15 RX ADMIN — MORPHINE SULFATE 4 MILLIGRAM(S): 50 CAPSULE, EXTENDED RELEASE ORAL at 08:02

## 2020-09-15 RX ADMIN — SODIUM CHLORIDE 1000 MILLILITER(S): 9 INJECTION INTRAMUSCULAR; INTRAVENOUS; SUBCUTANEOUS at 08:02

## 2020-09-15 RX ADMIN — ONDANSETRON 4 MILLIGRAM(S): 8 TABLET, FILM COATED ORAL at 08:02

## 2020-09-15 RX ADMIN — SODIUM CHLORIDE 1000 MILLILITER(S): 9 INJECTION INTRAMUSCULAR; INTRAVENOUS; SUBCUTANEOUS at 09:00

## 2020-09-15 RX ADMIN — MORPHINE SULFATE 4 MILLIGRAM(S): 50 CAPSULE, EXTENDED RELEASE ORAL at 08:50

## 2020-09-15 RX ADMIN — HYDROMORPHONE HYDROCHLORIDE 1 MILLIGRAM(S): 2 INJECTION INTRAMUSCULAR; INTRAVENOUS; SUBCUTANEOUS at 17:43

## 2020-09-15 RX ADMIN — HYDROMORPHONE HYDROCHLORIDE 1 MILLIGRAM(S): 2 INJECTION INTRAMUSCULAR; INTRAVENOUS; SUBCUTANEOUS at 22:11

## 2020-09-15 RX ADMIN — ENOXAPARIN SODIUM 40 MILLIGRAM(S): 100 INJECTION SUBCUTANEOUS at 17:17

## 2020-09-15 RX ADMIN — MORPHINE SULFATE 4 MILLIGRAM(S): 50 CAPSULE, EXTENDED RELEASE ORAL at 11:58

## 2020-09-15 RX ADMIN — PIPERACILLIN AND TAZOBACTAM 200 GRAM(S): 4; .5 INJECTION, POWDER, LYOPHILIZED, FOR SOLUTION INTRAVENOUS at 11:41

## 2020-09-15 RX ADMIN — HYDROMORPHONE HYDROCHLORIDE 1 MILLIGRAM(S): 2 INJECTION INTRAMUSCULAR; INTRAVENOUS; SUBCUTANEOUS at 21:41

## 2020-09-15 RX ADMIN — HYDROMORPHONE HYDROCHLORIDE 1 MILLIGRAM(S): 2 INJECTION INTRAMUSCULAR; INTRAVENOUS; SUBCUTANEOUS at 17:16

## 2020-09-15 NOTE — CONSULT NOTE ADULT - ATTENDING COMMENTS
54M well known to our service w GERD, PUD, hypothyroidism with hx of alcoholic pancreatitis complicated by wall-off necrosis and portal vein thrombus presents with persistent abdominal pain/nausea.  He has been evaluated by our service for possible AXIOS drainage of WON adjacent to pancreatic tail most recently on 8/25- however at that time, the WON has substantially shrank in size and was surrounded by vasculature- patient had also reported improvement in symptoms, thus no AXIOS was placed.  He subsequently complained of the same symptoms and now returns to the ED.  Interestingly, on CT obtained here, the previously seen pseudocyst in uncinate and necrotic tail collection have continued to decrease in size, however, there are two new, acute fluid collections that are large and not previously seen (one in lesser sac adjacent to gastric wall and one superior to spleen). This could be acute pancreatic fluid collections in setting of recurrent pancreatitis, or perhaps, a PD disruption or leak.     Pain and nausea management for now.  Will discuss management plan with entire advanced team as well as radiology and Dr. Duke.  It is not routinely recommended to drain acute pancreatic fluid collections. However if endoscopic drainage is determined to be the best option for the patient, the lesser sac collection technically drainable given proximity to stomach and appearance of a formed wall on CT.  Superior splenic collection less likely accessible endoscopically.  Furthermore if there is concern for PD leak, MRCP with secretin administration is the best study for evaluation.  This should not be performed in the setting of acute pancreatitis. ERCP with pancreatogram/PD stenting also not recommended in setting of pancreatitis.    Thank you for this interesting consult.  Please call the advanced GI service with any questions or concerns.

## 2020-09-15 NOTE — ED ADULT NURSE NOTE - OBJECTIVE STATEMENT
53 yo m pmhx hypothyroid presents to the ED with c/o LLQ abd pain started on saturday. PT reports he was here in july, was told he has pancreatitis. On saturday he started experiencing LLQ abd pain, came on acutely, reports it was 4/10 at the time, no provoking or alleviating factors, states he thought the pain would subside but it didn't. He is here today because the pain is severe. Reports 10/10 pain, vomited x1 in the ed. PT appears in pain, reports he cant tolerate the pain anymore. Pt is tender to palpation to the LLQ and suprapubic area. Placed on cardiac monitor. LBM was yesterday.

## 2020-09-15 NOTE — CONSULT NOTE ADULT - ASSESSMENT
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 and portal vein thrombus presents with decreased PO intake and nausea - Found to have new intraabdominal fluid collections as well as decreased WON    Impression:  # Intra-abdominal fluid collections: Suspect pseudocyst given hx of pancreatitis  # Descending Colon Thickening: Suspect related to undergoing pancreatitis  # Hx of necrotizing pancreatitis with WON secondary to EtOH    Recs:    Dari Menchaca MD  Gastroenterology Fellow  [phone]  [phone]  Available on [employer] Teams  Please page on call fellow on weekends and after 5pm on weekdays: [phone] 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 and portal vein thrombus presents with decreased PO intake and nausea - Found to have new intraabdominal fluid collections as well as decreased WON    Impression:  # Acute Peripancreatic fluid collections: Suspect related to ongoing pancreatitis  # Descending Colon Thickening: Suspect related to undergoing pancreatitis  # Hx of necrotizing pancreatitis with WON secondary to EtOH    Recs:  - No plans for EUS at this time given acute fluid collections  - MRCP secretin once pancreatitis resolves.  - Pain control per primary team  - Supportive care per primary team    Dari Menchaca MD  Gastroenterology Fellow  [phone]  [phone]  Available on [employer] Teams  Please page on call fellow on weekends and after 5pm on weekdays: [phone]

## 2020-09-15 NOTE — ED PROVIDER NOTE - OBJECTIVE STATEMENT
54M with hx of hypothyroidism with recent admission for necrotizing/hemorrhagic pancreatitis and splenic infarcts presenting with lower abdominal pain, left > right every day since being discharged 8/10/20. Reports decreased po intake over the past few days because of nausea. Denies vomiting. Denies fever, chills. No diarrhea. Had a BM yesterday. Is passing gas. No hx of abdominal surgeries.

## 2020-09-15 NOTE — ED ADULT NURSE REASSESSMENT NOTE - NS ED NURSE REASSESS COMMENT FT1
Pt reports he is starting to feel pain, MD Rosado made aware of the pt's pain.
Pt reports improvement in abd pain. states he feels better and no longer nauseous.

## 2020-09-15 NOTE — ED PROVIDER NOTE - PHYSICAL EXAMINATION
GENERAL: non-toxic appearing, in NAD  HEAD: atraumatic, normocephalic  EYES: vision grossly intact, no conjunctivitis or discharge  EARS: hearing grossly intact  NOSE: no nasal discharge, epistaxis   CARDIAC: tachycardic, normotensive  PULM: no respiratory distress, oxygen saturation on RA wnl, CTAB, no crackles, rales, rhonchi, or wheezing  GI: abdomen nondistended, soft, ttp in lower quadrants (L>R), no rebound or guarding  NEURO: awake and alert, follows commands, normal speech, PERRLA, EOMI, no focal motor or sensory deficits  MSK: spine appears normal, no joint swelling or erythema, no gross deformities of extremities  EXT: no peripheral edema, calf tenderness, redness or swelling  SKIN: warm, dry, and intact, no rashes  PSYCH: appropriate mood and affect

## 2020-09-15 NOTE — H&P ADULT - NSHPLABSRESULTS_GEN_ALL_CORE
13.4   11.58 )-----------( 408      ( 15 Sep 2020 08:24 )             41.1   09-15    135  |  98  |  14  ----------------------------<  129<H>  4.8   |  23  |  0.91    Ca    9.9      15 Sep 2020 08:24  Phos  4.2     09-15  Mg     2.0     09-15    TPro  7.7  /  Alb  3.9  /  TBili  0.8  /  DBili  x   /  AST  11  /  ALT  9<L>  /  AlkPhos  108  09-15  PT/INR - ( 15 Sep 2020 08:24 )   PT: 15.0 sec;   INR: 1.28 ratio         PTT - ( 15 Sep 2020 08:24 )  PTT:32.6 sec    < from: CT Abdomen and Pelvis w/ IV Cont (09.15.20 @ 10:16) >    IMPRESSION:  New 9.7 cm fluid collection in the lesser sac, as well as a new 10.3 cm fluid collection along the superior aspect of the spleen. Additional small to moderate volume abdominopelvic free fluid.    Redemonstrated pancreatitis with interval decrease in size of pancreatic tail walled off necrosis, as described above. 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    Mild wall thickening of the colonic splenic flexure and descending colon, likely reactive in nature.          < end of copied text >

## 2020-09-15 NOTE — H&P ADULT - HISTORY OF PRESENT ILLNESS
Patient is a 54y old Male with HTN, GERD, Hypothyroidism, PTSD, Alcoholic pancreatitis who presents with a chief complaint of abdominal pain. Patient was previously admitted to the hospital on 8/5 for pancreatitis with fluid collection of 4cm, he underwent MRCP on 8/6 showing collection of 3.6cm at the uncinate process. He was discharged after improvement in symptoms and underwent outpatient ERCP which showed 3.6 cm and was too small to do cystgastotomy. He presents again today reporting abdomial pain in his epigastric and LLQ. patient reports passing flatus and having bowel movement. Denies nausea, vomiting, SOB, CP, dizziness

## 2020-09-15 NOTE — ED ADULT NURSE NOTE - NSIMPLEMENTINTERV_GEN_ALL_ED
Implemented All Universal Safety Interventions:  [place] to call system. Call bell, personal items and telephone within reach. Instruct patient to call for assistance. Room bathroom lighting operational. Non-slip footwear when patient is off stretcher. Physically safe environment: no spills, clutter or unnecessary equipment. Stretcher in lowest position, wheels locked, appropriate side rails in place.

## 2020-09-15 NOTE — CONSULT NOTE ADULT - SUBJECTIVE AND OBJECTIVE BOX
Chief Complaint:  Patient is a 54y old  Male who presents with a chief complaint of Pancreatitis with fluid collection (15 Sep 2020 15:22)    HPI:  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 and portal vein thrombus presents with decreased PO intake and nausea. Patient was initially admitted for alcoholic pancreatitis in 4/22/20 with MRCP showing uncinate necrosis. At that time he was found to have a portal vein thrombus at that time & was started on Eliquis. He was discharged after improvement with outpatient follow up with Dr. Duke. Ct was done concern for pancreatic head mass – subsequent EUS with Dr. Kay showing a 3.6 cm WON near the tail, but was surrounded by collateral blood vessels – so Axios not placed (8/25/20).   Patient now presents reports continued abdominal pain that he states is epigastric, but also in the LUQ/RUQ, constant, radiates upward and 10/10 (at its worst). Due to the pain he hasn’t eaten anything in ~ 3 days due to nausea. He reports of fevers, chills, CP, SOB, vomiting, diarrhea, melena, hematemesis, hematochezia, cough, diarrhea, constipation and headache. CT showed a 9.7 cm collection in the lesser sac and 10.3 cm collection above the spleen with moderate abdominopelvic free fluid. Decreased pancreatic tail walled off necrosis with chronic occlusion of splenic vein.  GI consulted for further evaluation  Allergies:  No Known Allergies    Home Medications:  Reviewed    Hospital Medications:  amLODIPine   Tablet 10 milliGRAM(s) Oral daily  enoxaparin Injectable 40 milliGRAM(s) SubCutaneous once  influenza   Vaccine 0.5 milliLiter(s) IntraMuscular once  lactated ringers. 1000 milliLiter(s) IV Continuous <Continuous>  levothyroxine 150 MICROGram(s) Oral daily    PMHX/PSHX:  Pancreatitis  Hypertension  Hernia  Hypothyroid  History of back surgery  No significant past surgical history    Family history:  No pertinent family history in first degree relatives    Social History:   Denies recent tobacco use, EtOH use or illicit drug use     ROS:   General:  No fevers, chills or night sweats.  ENT:  No sore throat or dysphagia  CV:  No pain or palpitations  Resp:  No dyspnea, cough, wheezing  GI:  See H&P  Skin:  No rash or edema    PHYSICAL EXAM:   GENERAL:  NAD, Appears stated age  HEENT:  NC/AT,  conjunctivae clear and pink,  CHEST:  CTA B/L, Normal effort  HEART:  RRR S1/S2, No murmurs  ABDOMEN:  Soft, non-tender, non-distended, BS+  EXTEREMITIES:  No cyanosis or Edema  SKIN:  Warm & Dry.  NEURO:  Alert, oriented    Vital Signs:  Vital Signs Last 24 Hrs  T(C): 37.2 (15 Sep 2020 15:46), Max: 37.4 (15 Sep 2020 15:04)  T(F): 99 (15 Sep 2020 15:46), Max: 99.3 (15 Sep 2020 15:04)  HR: 92 (15 Sep 2020 15:46) (92 - 117)  BP: 129/85 (15 Sep 2020 15:46) (104/73 - 150/96)  BP(mean): 92 (15 Sep 2020 15:04) (92 - 92)  RR: 18 (15 Sep 2020 15:46) (16 - 20)  SpO2: 99% (15 Sep 2020 15:46) (98% - 100%)  Daily Height in cm: 180.34 (15 Sep 2020 06:48)    Daily     LABS:                        13.4   11.58 )-----------( 408      ( 15 Sep 2020 08:24 )             41.1     Mean Cell Volume: 94.9 fl (09-15-20 @ 08:24)    09-15    135  |  98  |  14  ----------------------------<  129<H>  4.8   |  23  |  0.91    Ca    9.9      15 Sep 2020 08:24  Phos  4.2     09-15  Mg     2.0     09-15    TPro  7.7  /  Alb  3.9  /  TBili  0.8  /  DBili  x   /  AST  11  /  ALT  9<L>  /  AlkPhos  108  09-15    LIVER FUNCTIONS - ( 15 Sep 2020 08:24 )  Alb: 3.9 g/dL / Pro: 7.7 g/dL / ALK PHOS: 108 U/L / ALT: 9 U/L / AST: 11 U/L / GGT: x           PT/INR - ( 15 Sep 2020 08:24 )   PT: 15.0 sec;   INR: 1.28 ratio         PTT - ( 15 Sep 2020 08:24 )  PTT:32.6 sec    Amylase Serum--      Lipase [id number]       Ammonia--                          13.4   11.58 )-----------( 408      ( 15 Sep 2020 08:24 )             41.1     Imaging:           Chief Complaint:  Patient is a 54y old  Male who presents with a chief complaint of Pancreatitis with fluid collection (15 Sep 2020 15:22)    HPI:  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 and portal vein thrombus presents with decreased PO intake and nausea. Patient was initially admitted for alcoholic pancreatitis in 4/22/20 with MRCP showing uncinate necrosis. At that time he was found to have a portal vein thrombus at that time & was started on Eliquis. He was discharged after improvement with outpatient follow up with Dr. Duke. Ct was done concern for pancreatic head mass – subsequent EUS with Dr. Kay showing a 3.6 cm WON near the tail, but was surrounded by collateral blood vessels – so Axios not placed (8/25/20).   Patient now presents reports continued abdominal pain that he states is epigastric, but also in the LUQ/RUQ, constant, radiates upward and 10/10 (at its worst). Due to the pain he hasn’t eaten anything in ~ 3 days due to nausea. He reports of fevers, chills, CP, SOB, vomiting, diarrhea, melena, hematemesis, hematochezia, cough, diarrhea, constipation and headache. CT showed a 9.7 cm collection in the lesser sac and 10.3 cm collection above the spleen with moderate abdominopelvic free fluid. Decreased pancreatic tail walled off necrosis with chronic occlusion of splenic vein.  GI consulted for further evaluation  Allergies:  No Known Allergies    Home Medications:  Reviewed    Hospital Medications:  amLODIPine   Tablet 10 milliGRAM(s) Oral daily  enoxaparin Injectable 40 milliGRAM(s) SubCutaneous once  influenza   Vaccine 0.5 milliLiter(s) IntraMuscular once  lactated ringers. 1000 milliLiter(s) IV Continuous <Continuous>  levothyroxine 150 MICROGram(s) Oral daily    PMHX/PSHX:  Pancreatitis  Hypertension  Hernia  Hypothyroid  History of back surgery  No significant past surgical history    Family history:  No pertinent family history in first degree relatives    Social History:   Denies recent tobacco use, EtOH use or illicit drug use     ROS:   General:  No fevers, chills or night sweats.  ENT:  No sore throat or dysphagia  CV:  No pain or palpitations  Resp:  No dyspnea, cough, wheezing  GI:  See H&P  Skin:  No rash or edema    PHYSICAL EXAM:   GENERAL:  NAD, Appears stated age  HEENT:  NC/AT,  conjunctivae clear and pink,  CHEST:  CTA B/L, Normal effort  HEART:  RRR S1/S2, No murmurs  ABDOMEN:  Soft, non-tender, non-distended, BS+  EXTEREMITIES:  No cyanosis or Edema  SKIN:  Warm & Dry.  NEURO:  Alert, oriented    Vital Signs:  Vital Signs Last 24 Hrs  T(C): 37.2 (15 Sep 2020 15:46), Max: 37.4 (15 Sep 2020 15:04)  T(F): 99 (15 Sep 2020 15:46), Max: 99.3 (15 Sep 2020 15:04)  HR: 92 (15 Sep 2020 15:46) (92 - 117)  BP: 129/85 (15 Sep 2020 15:46) (104/73 - 150/96)  BP(mean): 92 (15 Sep 2020 15:04) (92 - 92)  RR: 18 (15 Sep 2020 15:46) (16 - 20)  SpO2: 99% (15 Sep 2020 15:46) (98% - 100%)  Daily Height in cm: 180.34 (15 Sep 2020 06:48)    Daily     LABS:                        13.4   11.58 )-----------( 408      ( 15 Sep 2020 08:24 )             41.1     Mean Cell Volume: 94.9 fl (09-15-20 @ 08:24)    09-15    135  |  98  |  14  ----------------------------<  129<H>  4.8   |  23  |  0.91    Ca    9.9      15 Sep 2020 08:24  Phos  4.2     09-15  Mg     2.0     09-15    TPro  7.7  /  Alb  3.9  /  TBili  0.8  /  DBili  x   /  AST  11  /  ALT  9<L>  /  AlkPhos  108  09-15    LIVER FUNCTIONS - ( 15 Sep 2020 08:24 )  Alb: 3.9 g/dL / Pro: 7.7 g/dL / ALK PHOS: 108 U/L / ALT: 9 U/L / AST: 11 U/L / GGT: x           PT/INR - ( 15 Sep 2020 08:24 )   PT: 15.0 sec;   INR: 1.28 ratio         PTT - ( 15 Sep 2020 08:24 )  PTT:32.6 sec    Amylase Serum--      Lipase [id number]       Ammonia--                          13.4   11.58 )-----------( 408      ( 15 Sep 2020 08:24 )             41.1     Imaging:  < from: CT Abdomen and Pelvis w/ IV Cont (09.15.20 @ 10:16) >    EXAM:  CT ABDOMEN AND PELVIS IC                            PROCEDURE DATE:  09/15/2020            INTERPRETATION:  CLINICAL INFORMATION: Left-sided abdominal pain, pancreatitis    COMPARISON: MRI abdomen 8/6/2020, CT chest/abdomen/pelvis 8/5/2020    PROCEDURE:  CT of the Abdomen and Pelvis was performed with intravenous contrast.  Intravenous contrast: 90 ml Omnipaque 350. 10 ml discarded.  Oral contrast: None.  Sagittal and coronal reformats were performed.    FINDINGS:  LOWER CHEST: Trace bibasilar linear atelectasis. Elevation of the left hemidiaphragm.    LIVER: Within normal limits.  BILE DUCTS: Normal caliber.  GALLBLADDER: Within normal limits.  SPLEEN: Interval decrease in size of the splenic infarcts.  PANCREAS: Redemonstrated peripancreatic inflammatory changes/fluid. Redemonstrated fluid collection in the region of the pancreatic tail, intervally decreased in size, measuring up to 2.5 cm and previously measuring 4.0 cm.  A 2.3 cm cystic lesion in the uncinate process is stable or minimally decreased in size.  ADRENALS: Within normal limits.  KIDNEYS/URETERS: No hydronephrosis. Symmetric enhancement.    BLADDER: Within normal limits.  REPRODUCTIVE ORGANS: Normal sized prostate.    BOWEL: Mild wall thickening of the colonicsplenic flexure and descending colon, which may be reactive. No bowel obstruction. Appendix is normal.  PERITONEUM: A new 9.7 x 7.0 cm fluid collection in the lesser sac (2:28). Additional new 10.3 x 5.2 cm fluid collection along the superior aspect of the spleen (2:18). Small to moderate volume abdominopelvic free fluid.  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 Redemonstrated left upper quadrant collateral vasculature. Atherosclerotic changes.  RETROPERITONEUM/LYMPH NODES:A 1.7 x 1.6 cm celiac axis node is slightly increased in size. Small retroperitoneal lymph nodes are also noted.  ABDOMINAL WALL: Within normal limits.  BONES: Degenerative changes.    IMPRESSION:  New 9.7 cm fluid collection in the lesser sac, as well as a new 10.3 cm fluid collection along the superior aspect of the spleen. Additional small to moderate volume abdominopelvic free fluid.    Redemonstrated pancreatitis with interval decrease in size of pancreatic tail walled off necrosis, as described above. 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    Mild wall thickening of the colonic splenic flexure and descending colon, likely reactive in nature.      JERRY PACE M.D., RADIOLOGY RESIDENT  This document has been electronically signed.  MAICOL CALVO M.D., ATTENDING RADIOLOGIST  This document has been electronically signed. Sep 15 2020 11:38AM    < end of copied text >    < from: MR Abdomen w/wo IV Cont (08.06.20 @ 21:46) >    EXAM:  MR ABDOMEN WAW IC                            PROCEDURE DATE:  08/06/2020            INTERPRETATION:  CLINICAL INFORMATION: Pancreatic collections, further evaluation    COMPARISON: CTA chest, abdomen and pelvis 8/5/2020 and MR abdomen 4/22/2019.    PROCEDURE:  MRI of the abdomen was performed with and without intravenous contrast.  IV Contrast: Gadavist. 7.5 cc administered, 0 cc discarded.  MRCP was performed.    FINDINGS:  LOWER CHEST: Small bilateral pleural effusions with associated bibasilar atelectasis.    LIVER: Normal morphology. Left hepatic lobe steatosis, decreased. No focal lesion.  BILE DUCTS: Normal caliber.  GALLBLADDER: Within normal limits.  SPLEEN: Splenic infarcts as at recent CT.  PANCREAS: Heterogeneous gland with decreased signal and delayed enhancement consistent with pancreatitis. Foci of hemorrhage are noted adjacent to the pancreatic tail. A few cysts in the pancreatic head and uncinate process with the largest measuring 2.4 cm likely a pseudocyst. A more ill-defined collection in the pancreatic tail which contains internal necrotic debris and an enhancing wall measuring 4.7 x 3.4 cm. Pancreatic duct is normal in caliber. A serpiginous collection of fluid posterior to the cystic lesion in the tail isfavored to represent an additional collection rather than a dilated duct in the tail.  ADRENALS: Within normal limits.  KIDNEYS/URETERS: Within normal limits.    VISUALIZED PORTIONS:  BOWEL: Within normal limits.  PERITONEUM: Small ascites.  VESSELS:Hepatic and portal veins are patent as is the SMV. Chronic occlusion of the splenic vein at the tail. Left upper quadrant collateral vasculature.  RETROPERITONEUM/LYMPH NODES: No lymphadenopathy.  ABDOMINAL WALL: Left flank intramuscular edema.  BONES: Within normal limits.    IMPRESSION:  Hemorrhagic/necrotizing pancreatitis.    A 2.4 cm pseudocyst in the uncinate process and a 4.7 cm walled off necrosis at the pancreatic tail.    Splenic infarcts.      Small ascites.      TERRANCE CARVAJAL M.D., RADIOLOGY RESIDENT  This document has been electronically signed.  JABARI CORDON MD; Attending Radiologist  This document has been electronically signed. Aug  7 2020  9:43AM          < end of copied text >    < from: Upper Endoscopy (08.25.20 @ 12:38) >    [facility]  ____________________________________________________________________________________________________  Patient Name: Brian Blum                      MRN: [id number]  Account Number: [id number]                     YOB: 1965  Room: Endoscopy Room 2                           Gender: Male  Attending MD: Delfino Kay MD                Procedure Date No Time: 8/25/2020  ____________________________________________________________________________________________________     Procedure:           Upper GI endoscopy  Indications:         Pancreatic necrosis on imaging, abdominal pain and weight loss, prior history                        of acute pancreatitis  Providers:           Delfino Kay MD, Lucie Kang (Fellow)  Referring MD:        Theo Duke  Medicines:           General Anesthesia  Complications:       No immediate complications. Estimated blood loss: None.  ____________________________________________________________________________________________________  Procedure:           Pre-Anesthesia Assessment:                       - Prior to the procedure, a History and Physical was performed, and patient                        medications and allergies were reviewed. The patient is competent. The risks                        and benefits of the procedure and the sedation options and risks were                        discussed with the patient. All questions were answered and informed consent                        was obtained. Patient identification and proposed procedure were verified by                        the physician, the nurse and the anesthetist in the endoscopy suite.                        Prophylactic Antibiotics: The patient requires prophylactic antibiotics cyst.                        Prior Anticoagulants: The patient has taken no previous anticoagulant or                        antiplatelet agents. ASA Grade Assessment: III - A patient with severe                        systemic disease. After reviewing the risks and benefits, the patient was                        deemed in satisfactory condition to undergo the procedure. The anesthesia                        plan was to use general anesthesia. Immediately prior to administration of                 medications, the patient was re-assessed for adequacy to receive sedatives.                        The heart rate, respiratory rate, oxygen saturations, blood pressure,                        adequacy of pulmonary ventilation, and response to care were monitored                        throughout the procedure. The physical status of the patient was re-assessed                        after the procedure.                       After obtaining informed consent, the endoscope was passedunder direct                        vision. Throughout the procedure, the patient's blood pressure, pulse, and                        oxygen saturations were monitored continuously. The was introduced through                        the mouth, and advanced to the second part of duodenum. The was introduced                        through the mouth, and advanced to the. The upper GI endoscopy was                        accomplished without difficulty. The patient tolerated the procedure well.                                                                                                  Findings:       EGD:       The examined esophagus was normal.       The examined stomach was normal. There was no extrinsic compression.       The examined duodenum was normal.       EUS:       The exam was performed with a linear echoendoscope.       The area of walled off pancreatic necrosis was visualized. It was small in diameter (3.6cm        max) with a well formed wall, heterogeneous interior with anechoic fluid and a hypoechoic        layering densities consistent with necrotic material (< 20-30%). The WON was adjacent to the        spleen. There were numerous blood vessels adjacent to and surrounding the WON.       The rest of the pancreatic parenchyma was heterogeneous. The PD was tortuous throughout the        duct. There was a small fluid collection with a well formed wall and anechoic fluid interior        in the uncinate process of the pancreas.       CBD was small and normal in course.       The liver appeared normal, as did the spleen.       The portal vein/SMV appeared normal, however the splenic vein was not visualized.       There were multiple small shane-portal and shane-pancreatic lymph nodes.       An initial attempt atpuncture of the cyst with the hot AXIOS needle was made, however, due        to the large number of blood vessels and the small size of the collection, puncture of the        WON was not possible and the decision was made to not perform further attempts                                                                                                        Impression:          - Normal EGD.                       - 3.6cm walled off pancreatic necrosis near the tail of the pancreas and the               spleen, decreased in size compared to prior imaging. The WON was surrounded                        by splenic vein collateral blood vessels. Given the small size and blood                        vessels, cystgastrostomy was not performed.                       - Chronic pancreatisis characterized by heterogeneous parenchyma and atrophy.                        Uncinate process psuedocyst.  Recommendation:      - Discharge patient to home (ambulatory).                       - Followup with Dr. Duke.                       - Conservative therapy for chronic pancreatitis and WON (surveillance                        imaging).                                                                                                        Attending Participation:       I was present and participated during the entire procedure, including non-key portions.                                                                                                          __________________  Delfino Kay MD  8/25/2020 2:35:20 PM  Number of Addenda: 0    Note Initiated On: 8/25/2020 12:38 PM    < end of copied text >

## 2020-09-15 NOTE — ED PROVIDER NOTE - ATTENDING CONTRIBUTION TO CARE
pt is a 53 y/o male with abdominal pain epigastric with no radiation, h/o pancreatitic necrosis has been in the hospital recently for pancreatits, egd, here or abdominal pain and n/v.  CT ordered, labs, ivf, analgesia, likely admission. denies etoh use.

## 2020-09-15 NOTE — ED PROVIDER NOTE - NS ED ROS FT
GENERAL: no fever, chills, fatigue, weight loss, night sweats  HEENT: no eye pain, discharge, conjunctivitis, ear pain, hearing loss, rhinorrhea, congestion, throat pain  CARDIAC: no chest pain, palpitations, lightheadedness, syncope  PULM: no dyspnea, wheezing  GI: + decreased po intake, abdominal pain, nausea  : no urinary dysuria, frequency, incontinence, hematuria  NEURO: no headache, changes in vision, motor weakness, sensory changes  MSK: no joint pain, joint swelling, myalgias  SKIN: no rashes  HEME: no active bleeding, excessive bruising

## 2020-09-15 NOTE — H&P ADULT - NSHPPHYSICALEXAM_GEN_ALL_CORE
General: well developed, well nourished, NAD  Neuro: alert and oriented, no focal deficits, moves all extremities spontaneously  HEENT: NCAT, no lymphadenopathy  Respiratory: airway patent, respirations unlabored  CVS: regular rate and rhythm  Abdomen: soft, distended, LLQ tenderness  Extremities: no edema, sensation and movement grossly intact  Skin: warm, dry, appropriate color

## 2020-09-16 LAB
ALBUMIN SERPL ELPH-MCNC: 3.6 G/DL — SIGNIFICANT CHANGE UP (ref 3.3–5)
ALP SERPL-CCNC: 89 U/L — SIGNIFICANT CHANGE UP (ref 40–120)
ALT FLD-CCNC: 7 U/L — LOW (ref 10–45)
AMYLASE P1 CFR SERPL: 376 U/L — HIGH (ref 25–125)
ANION GAP SERPL CALC-SCNC: 13 MMOL/L — SIGNIFICANT CHANGE UP (ref 5–17)
AST SERPL-CCNC: 11 U/L — SIGNIFICANT CHANGE UP (ref 10–40)
BILIRUB SERPL-MCNC: 1.2 MG/DL — SIGNIFICANT CHANGE UP (ref 0.2–1.2)
BUN SERPL-MCNC: 15 MG/DL — SIGNIFICANT CHANGE UP (ref 7–23)
CALCIUM SERPL-MCNC: 9.9 MG/DL — SIGNIFICANT CHANGE UP (ref 8.4–10.5)
CHLORIDE SERPL-SCNC: 98 MMOL/L — SIGNIFICANT CHANGE UP (ref 96–108)
CO2 SERPL-SCNC: 24 MMOL/L — SIGNIFICANT CHANGE UP (ref 22–31)
CREAT SERPL-MCNC: 0.92 MG/DL — SIGNIFICANT CHANGE UP (ref 0.5–1.3)
CULTURE RESULTS: NO GROWTH — SIGNIFICANT CHANGE UP
GLUCOSE SERPL-MCNC: 110 MG/DL — HIGH (ref 70–99)
HCT VFR BLD CALC: 34 % — LOW (ref 39–50)
HGB BLD-MCNC: 10.9 G/DL — LOW (ref 13–17)
LIDOCAIN IGE QN: 125 U/L — HIGH (ref 7–60)
MAGNESIUM SERPL-MCNC: 2 MG/DL — SIGNIFICANT CHANGE UP (ref 1.6–2.6)
MCHC RBC-ENTMCNC: 30.5 PG — SIGNIFICANT CHANGE UP (ref 27–34)
MCHC RBC-ENTMCNC: 32.1 GM/DL — SIGNIFICANT CHANGE UP (ref 32–36)
MCV RBC AUTO: 95.2 FL — SIGNIFICANT CHANGE UP (ref 80–100)
NRBC # BLD: 0 /100 WBCS — SIGNIFICANT CHANGE UP (ref 0–0)
PHOSPHATE SERPL-MCNC: 4 MG/DL — SIGNIFICANT CHANGE UP (ref 2.5–4.5)
PLATELET # BLD AUTO: 292 K/UL — SIGNIFICANT CHANGE UP (ref 150–400)
POTASSIUM SERPL-MCNC: 4.8 MMOL/L — SIGNIFICANT CHANGE UP (ref 3.5–5.3)
POTASSIUM SERPL-SCNC: 4.8 MMOL/L — SIGNIFICANT CHANGE UP (ref 3.5–5.3)
PROT SERPL-MCNC: 6.8 G/DL — SIGNIFICANT CHANGE UP (ref 6–8.3)
RBC # BLD: 3.57 M/UL — LOW (ref 4.2–5.8)
RBC # FLD: 15.9 % — HIGH (ref 10.3–14.5)
SODIUM SERPL-SCNC: 135 MMOL/L — SIGNIFICANT CHANGE UP (ref 135–145)
SPECIMEN SOURCE: SIGNIFICANT CHANGE UP
WBC # BLD: 8.24 K/UL — SIGNIFICANT CHANGE UP (ref 3.8–10.5)
WBC # FLD AUTO: 8.24 K/UL — SIGNIFICANT CHANGE UP (ref 3.8–10.5)

## 2020-09-16 PROCEDURE — 74183 MRI ABD W/O CNTR FLWD CNTR: CPT | Mod: 26

## 2020-09-16 PROCEDURE — 99232 SBSQ HOSP IP/OBS MODERATE 35: CPT | Mod: GC

## 2020-09-16 RX ORDER — ENOXAPARIN SODIUM 100 MG/ML
40 INJECTION SUBCUTANEOUS DAILY
Refills: 0 | Status: DISCONTINUED | OUTPATIENT
Start: 2020-09-16 | End: 2020-09-22

## 2020-09-16 RX ORDER — PANTOPRAZOLE SODIUM 20 MG/1
40 TABLET, DELAYED RELEASE ORAL
Refills: 0 | Status: DISCONTINUED | OUTPATIENT
Start: 2020-09-16 | End: 2020-09-22

## 2020-09-16 RX ORDER — AMLODIPINE BESYLATE 2.5 MG/1
10 TABLET ORAL DAILY
Refills: 0 | Status: DISCONTINUED | OUTPATIENT
Start: 2020-09-16 | End: 2020-09-22

## 2020-09-16 RX ADMIN — HYDROMORPHONE HYDROCHLORIDE 1 MILLIGRAM(S): 2 INJECTION INTRAMUSCULAR; INTRAVENOUS; SUBCUTANEOUS at 06:46

## 2020-09-16 RX ADMIN — HYDROMORPHONE HYDROCHLORIDE 1 MILLIGRAM(S): 2 INJECTION INTRAMUSCULAR; INTRAVENOUS; SUBCUTANEOUS at 16:57

## 2020-09-16 RX ADMIN — HYDROMORPHONE HYDROCHLORIDE 1 MILLIGRAM(S): 2 INJECTION INTRAMUSCULAR; INTRAVENOUS; SUBCUTANEOUS at 16:24

## 2020-09-16 RX ADMIN — HYDROMORPHONE HYDROCHLORIDE 1 MILLIGRAM(S): 2 INJECTION INTRAMUSCULAR; INTRAVENOUS; SUBCUTANEOUS at 21:10

## 2020-09-16 RX ADMIN — HYDROMORPHONE HYDROCHLORIDE 1 MILLIGRAM(S): 2 INJECTION INTRAMUSCULAR; INTRAVENOUS; SUBCUTANEOUS at 10:25

## 2020-09-16 RX ADMIN — HYDROMORPHONE HYDROCHLORIDE 1 MILLIGRAM(S): 2 INJECTION INTRAMUSCULAR; INTRAVENOUS; SUBCUTANEOUS at 07:16

## 2020-09-16 RX ADMIN — HYDROMORPHONE HYDROCHLORIDE 1 MILLIGRAM(S): 2 INJECTION INTRAMUSCULAR; INTRAVENOUS; SUBCUTANEOUS at 02:56

## 2020-09-16 RX ADMIN — HYDROMORPHONE HYDROCHLORIDE 1 MILLIGRAM(S): 2 INJECTION INTRAMUSCULAR; INTRAVENOUS; SUBCUTANEOUS at 02:26

## 2020-09-16 RX ADMIN — SODIUM CHLORIDE 100 MILLILITER(S): 9 INJECTION, SOLUTION INTRAVENOUS at 06:48

## 2020-09-16 RX ADMIN — HYDROMORPHONE HYDROCHLORIDE 1 MILLIGRAM(S): 2 INJECTION INTRAMUSCULAR; INTRAVENOUS; SUBCUTANEOUS at 10:04

## 2020-09-16 RX ADMIN — ENOXAPARIN SODIUM 40 MILLIGRAM(S): 100 INJECTION SUBCUTANEOUS at 13:58

## 2020-09-16 RX ADMIN — HYDROMORPHONE HYDROCHLORIDE 1 MILLIGRAM(S): 2 INJECTION INTRAMUSCULAR; INTRAVENOUS; SUBCUTANEOUS at 20:40

## 2020-09-16 NOTE — PROGRESS NOTE ADULT - ASSESSMENT
54y old Male with HTN, GERD, Hypothyroidism, PTSD, Alcoholic pancreatitis who presents with progressive abdominal pain found to have new fluid collection in the lesser sac and enlarging fluid collection above the spleen.     Plan:  - NPO with sips for comfort  - GI recs no indication for EUS given acute fluid collections  - MRCP secretin once pancreatitis resolves  - DVt ppx      Blue Sx [id number]

## 2020-09-16 NOTE — PROGRESS NOTE ADULT - ASSESSMENT
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  - Pain control per primary team  - Supportive care per primary team    Dari Menchaca MD  Gastroenterology Fellow  [phone]  [phone]  Available on [employer] Teams  Please page on call fellow on weekends and after 5pm on weekdays: [phone] 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 and portal vein thrombus presents with decreased PO intake and nausea - Found to have new intraabdominal fluid collections as well as decreased WON    Impression:  # Acute Peripancreatic fluid collections: Suspect related to pancreatitis  # Descending Colon Thickening: Suspect related to undergoing pancreatitis  # 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  - MRCP secretin once pancreatitis resolves.  - Pain control per primary team  - Supportive care per primary team    Dari Menchaca MD  Gastroenterology Fellow  [phone]  Available on [employer] Teams  Please page on call fellow on weekends and after 5pm on weekdays: [phone] 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 and portal vein thrombus presents with decreased PO intake and nausea - Found to have new intraabdominal fluid collections as well as decreased WON    Impression:  # Acute Peripancreatic fluid collections: Suspect related to pancreatitis  # Descending Colon Thickening: Suspect related to undergoing pancreatitis  # 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  - MRCP secretin once pancreatitis resolves.  - Genetic testing for causes of pancreatitis  - Pain control per primary team  - Supportive care per primary team    Dari Menchaca MD  Gastroenterology Fellow  [phone]  Available on [employer] Teams  Please page on call fellow on weekends and after 5pm on weekdays: [phone] 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 and portal vein thrombus presents with decreased PO intake and nausea - Found to have new intraabdominal fluid collections as well as decreased WON    Impression:  # Acute Peripancreatic fluid collections: Suspect related to pancreatitis  # Descending Colon Thickening: Suspect related to undergoing pancreatitis  # 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  - MRCP secretin once pancreatitis resolves.  - Genetic testing for causes of pancreatitis: PRSS1, SPINK1  - Pain control per primary team  - Supportive care per primary team    Dari Menchaca MD  Gastroenterology Fellow  [phone]  Available on [employer] Teams  Please page on call fellow on weekends and after 5pm on weekdays: [phone]

## 2020-09-17 ENCOUNTER — TRANSCRIPTION ENCOUNTER (OUTPATIENT)
Age: 55
End: 2020-09-17

## 2020-09-17 LAB
ALBUMIN SERPL ELPH-MCNC: 3 G/DL — LOW (ref 3.3–5)
ALP SERPL-CCNC: 72 U/L — SIGNIFICANT CHANGE UP (ref 40–120)
ALT FLD-CCNC: 6 U/L — LOW (ref 10–45)
ANION GAP SERPL CALC-SCNC: 12 MMOL/L — SIGNIFICANT CHANGE UP (ref 5–17)
AST SERPL-CCNC: 11 U/L — SIGNIFICANT CHANGE UP (ref 10–40)
BILIRUB SERPL-MCNC: 0.7 MG/DL — SIGNIFICANT CHANGE UP (ref 0.2–1.2)
BUN SERPL-MCNC: 11 MG/DL — SIGNIFICANT CHANGE UP (ref 7–23)
CALCIUM SERPL-MCNC: 9.2 MG/DL — SIGNIFICANT CHANGE UP (ref 8.4–10.5)
CHLORIDE SERPL-SCNC: 100 MMOL/L — SIGNIFICANT CHANGE UP (ref 96–108)
CO2 SERPL-SCNC: 23 MMOL/L — SIGNIFICANT CHANGE UP (ref 22–31)
CREAT SERPL-MCNC: 0.77 MG/DL — SIGNIFICANT CHANGE UP (ref 0.5–1.3)
GLUCOSE SERPL-MCNC: 76 MG/DL — SIGNIFICANT CHANGE UP (ref 70–99)
MAGNESIUM SERPL-MCNC: 1.9 MG/DL — SIGNIFICANT CHANGE UP (ref 1.6–2.6)
PHOSPHATE SERPL-MCNC: 3.2 MG/DL — SIGNIFICANT CHANGE UP (ref 2.5–4.5)
POTASSIUM SERPL-MCNC: 4.2 MMOL/L — SIGNIFICANT CHANGE UP (ref 3.5–5.3)
POTASSIUM SERPL-SCNC: 4.2 MMOL/L — SIGNIFICANT CHANGE UP (ref 3.5–5.3)
PROT SERPL-MCNC: 6.1 G/DL — SIGNIFICANT CHANGE UP (ref 6–8.3)
SODIUM SERPL-SCNC: 135 MMOL/L — SIGNIFICANT CHANGE UP (ref 135–145)

## 2020-09-17 RX ADMIN — HYDROMORPHONE HYDROCHLORIDE 1 MILLIGRAM(S): 2 INJECTION INTRAMUSCULAR; INTRAVENOUS; SUBCUTANEOUS at 16:45

## 2020-09-17 RX ADMIN — HYDROMORPHONE HYDROCHLORIDE 1 MILLIGRAM(S): 2 INJECTION INTRAMUSCULAR; INTRAVENOUS; SUBCUTANEOUS at 11:28

## 2020-09-17 RX ADMIN — HYDROMORPHONE HYDROCHLORIDE 1 MILLIGRAM(S): 2 INJECTION INTRAMUSCULAR; INTRAVENOUS; SUBCUTANEOUS at 00:50

## 2020-09-17 RX ADMIN — HYDROMORPHONE HYDROCHLORIDE 1 MILLIGRAM(S): 2 INJECTION INTRAMUSCULAR; INTRAVENOUS; SUBCUTANEOUS at 21:27

## 2020-09-17 RX ADMIN — HYDROMORPHONE HYDROCHLORIDE 1 MILLIGRAM(S): 2 INJECTION INTRAMUSCULAR; INTRAVENOUS; SUBCUTANEOUS at 06:46

## 2020-09-17 RX ADMIN — HYDROMORPHONE HYDROCHLORIDE 1 MILLIGRAM(S): 2 INJECTION INTRAMUSCULAR; INTRAVENOUS; SUBCUTANEOUS at 00:20

## 2020-09-17 RX ADMIN — AMLODIPINE BESYLATE 10 MILLIGRAM(S): 2.5 TABLET ORAL at 06:16

## 2020-09-17 RX ADMIN — HYDROMORPHONE HYDROCHLORIDE 1 MILLIGRAM(S): 2 INJECTION INTRAMUSCULAR; INTRAVENOUS; SUBCUTANEOUS at 16:28

## 2020-09-17 RX ADMIN — HYDROMORPHONE HYDROCHLORIDE 1 MILLIGRAM(S): 2 INJECTION INTRAMUSCULAR; INTRAVENOUS; SUBCUTANEOUS at 11:45

## 2020-09-17 RX ADMIN — Medication 150 MICROGRAM(S): at 06:15

## 2020-09-17 RX ADMIN — ENOXAPARIN SODIUM 40 MILLIGRAM(S): 100 INJECTION SUBCUTANEOUS at 14:12

## 2020-09-17 RX ADMIN — HYDROMORPHONE HYDROCHLORIDE 1 MILLIGRAM(S): 2 INJECTION INTRAMUSCULAR; INTRAVENOUS; SUBCUTANEOUS at 20:57

## 2020-09-17 RX ADMIN — HYDROMORPHONE HYDROCHLORIDE 1 MILLIGRAM(S): 2 INJECTION INTRAMUSCULAR; INTRAVENOUS; SUBCUTANEOUS at 06:16

## 2020-09-17 RX ADMIN — PANTOPRAZOLE SODIUM 40 MILLIGRAM(S): 20 TABLET, DELAYED RELEASE ORAL at 06:16

## 2020-09-17 NOTE — PROGRESS NOTE ADULT - ASSESSMENT
54y old Male with HTN, GERD, Hypothyroidism, PTSD, Alcoholic pancreatitis who presents with progressive abdominal pain found to have new fluid collection in the lesser sac and enlarging fluid collection above the spleen.     Plan:  - NPO with sips for comfort  - GI recs no indication for EUS given acute fluid collections  - f/u MRCP  - DVt ppx      Blue Sx 9005

## 2020-09-17 NOTE — DISCHARGE NOTE PROVIDER - NSDCCPCAREPLAN_GEN_ALL_CORE_FT
PRINCIPAL DISCHARGE DIAGNOSIS  Diagnosis: Pancreatitis  Assessment and Plan of Treatment: resolving   Please follow up outpatient with Dr. Duke and Dr. Rocha. Please call their offices to schedule an appointment.       PRINCIPAL DISCHARGE DIAGNOSIS  Diagnosis: Pancreatitis  Assessment and Plan of Treatment: resolving   Pancreatic duct disruption  Please follow up outpatient with Dr. Duke and Dr. Rocha. Please call their offices to schedule an appointment.  follow up with pain management as outpatient

## 2020-09-17 NOTE — DISCHARGE NOTE PROVIDER - CARE PROVIDERS DIRECT ADDRESSES
[email or web address]

## 2020-09-17 NOTE — DISCHARGE NOTE PROVIDER - NSDCFUADDINST_GEN_ALL_CORE_FT
Please follow up with your primary care doctor in regards to hospitalization within 2 weeks of discharge from the hospital.

## 2020-09-17 NOTE — DISCHARGE NOTE PROVIDER - CARE PROVIDER_API CALL
Theo Duke  SURGERY  [address] [place] [address]  Phone: [phone]  Fax: [phone]  Follow Up Time: 2 weeks    Rodriguez Rocha  GASTROENTEROLOGY  Division of Gastroenterology  [address] [place] [address]  Phone: [phone]  Fax: [phone]  Follow Up Time: 2 weeks

## 2020-09-17 NOTE — DISCHARGE NOTE PROVIDER - NSDCMRMEDTOKEN_GEN_ALL_CORE_FT
amLODIPine 10 mg oral tablet: 1 tab(s) orally once a day  levothyroxine 150 mcg (0.15 mg) oral tablet: 1 tab(s) orally once a day  Protonix 20 mg oral delayed release tablet: 1 tab(s) orally once a day, As Needed   acetaminophen 500 mg oral tablet: 2 tab(s) orally every 6 hours, As needed, Mild Pain (1 - 3)  amLODIPine 10 mg oral tablet: 1 tab(s) orally once a day  ibuprofen 600 mg oral tablet: 1 tab(s) orally every 6 hours, As needed, Moderate Pain (4 - 6)  levothyroxine 150 mcg (0.15 mg) oral tablet: 1 tab(s) orally once a day  Protonix 20 mg oral delayed release tablet: 1 tab(s) orally once a day, As Needed   acetaminophen 500 mg oral tablet: 2 tab(s) orally every 6 hours, As needed, Mild Pain (1 - 3)  amLODIPine 10 mg oral tablet: 1 tab(s) orally once a day  ibuprofen 600 mg oral tablet: 1 tab(s) orally every 6 hours, As needed, Moderate Pain (4 - 6)  levothyroxine 150 mcg (0.15 mg) oral tablet: 1 tab(s) orally once a day  oxyCODONE 10 mg oral tablet: 1 tab(s) orally every 4 hours, As needed, Severe Pain (7 - 10) MDD:5  Protonix 20 mg oral delayed release tablet: 1 tab(s) orally once a day, As Needed

## 2020-09-17 NOTE — DISCHARGE NOTE PROVIDER - PROVIDER TOKENS
PROVIDER:[TOKEN:[[id number]],FOLLOWUP:[2 weeks]],PROVIDER:[TOKEN:[[id number]],FOLLOWUP:[2 weeks]]

## 2020-09-17 NOTE — DISCHARGE NOTE PROVIDER - HOSPITAL COURSE
54y old Male with HTN, GERD, Hypothyroidism, PTSD, Alcoholic pancreatitis who presents with progressive abdominal pain found to have new fluid collection in the lesser sac and enlarging fluid collection above the spleen. GI was consulted on admission for the chronic pancreatitis with fluid collections. Recommended for outpatient follow up when the pancreatitis has resolved. Patient had a MRCP which demonstrated _________. Patient's diet was advanced as tolerated.     At the time of discharge, the patient was hemodynamically stable, tolerating PO diet, voiding urine, ambulating and was comfortable with adequate pain control. The patient was instructed to follow up with Dr. Duke and Dr. Rocha within 1-2 weeks after discharge. The patient felt comfortable with discharge. The patient was discharged to home. The patient had no other issues.   54y old Male with HTN, GERD, Hypothyroidism, PTSD, Alcoholic pancreatitis who presents with progressive abdominal pain found to have new fluid collection in the lesser sac and enlarging fluid collection above the spleen. GI was consulted on admission for the chronic pancreatitis with fluid collections. Recommended for outpatient follow up when the pancreatitis has resolved. Patient had a MRCP which demonstrated redemonstration of walled off necrosis in the tail, with fluid tracking posterior to the pancreatic body. Nonvisualization of the pancreatic duct in this region with mild upstream ductal dilatation and irregularity, concerning for duct disruption. Collection in the lesser sac is increased since prior MRI 8/6/2020.    Patient's diet was advanced as tolerated. Plan for outpatient surgery for pancreatitic duct disruption. Chronic pain consulted prior to discharge to control pain as outpatient prior to  surgery.    At the time of discharge, the patient was hemodynamically stable, tolerating PO diet, voiding urine, ambulating and was comfortable with adequate pain control. The patient was instructed to follow up with Dr. Duke and Dr. Rocha within 1-2 weeks after discharge. The patient felt comfortable with discharge. The patient was discharged to home. The patient had no other issues.   54y old Male with HTN, GERD, Hypothyroidism, PTSD, Alcoholic pancreatitis who presents with progressive abdominal pain found to have new fluid collection in the lesser sac and enlarging fluid collection above the spleen. GI was consulted on admission for the chronic pancreatitis with fluid collections. Recommended for outpatient follow up when the pancreatitis has resolved. Patient had a MRCP which demonstrated redemonstration of walled off necrosis in the tail, with fluid tracking posterior to the pancreatic body. Nonvisualization of the pancreatic duct in this region with mild upstream ductal dilatation and irregularity, concerning for duct disruption. Collection in the lesser sac is increased since prior MRI 8/6/2020.    Patient's diet was advanced as tolerated. Plan for outpatient follow up and possible surgery for pancreatitic duct disruption. Chronic pain consulted prior to discharge to control pain as outpatient prior to  surgery.    At the time of discharge, the patient was hemodynamically stable, tolerating PO diet, voiding urine, ambulating and was comfortable with adequate pain control. The patient was instructed to follow up with Dr. Duke and Dr. Rocha within 1-2 weeks after discharge. The patient felt comfortable with discharge. He will follow up with pain management as outpatient. He was given a list of providers to choose from. The patient was discharged to home. The patient had no other issues.

## 2020-09-18 LAB
ALBUMIN SERPL ELPH-MCNC: 3 G/DL — LOW (ref 3.3–5)
ALP SERPL-CCNC: 69 U/L — SIGNIFICANT CHANGE UP (ref 40–120)
ALT FLD-CCNC: 6 U/L — LOW (ref 10–45)
ANION GAP SERPL CALC-SCNC: 8 MMOL/L — SIGNIFICANT CHANGE UP (ref 5–17)
AST SERPL-CCNC: 11 U/L — SIGNIFICANT CHANGE UP (ref 10–40)
BILIRUB SERPL-MCNC: 0.7 MG/DL — SIGNIFICANT CHANGE UP (ref 0.2–1.2)
BUN SERPL-MCNC: 5 MG/DL — LOW (ref 7–23)
CALCIUM SERPL-MCNC: 9.3 MG/DL — SIGNIFICANT CHANGE UP (ref 8.4–10.5)
CHLORIDE SERPL-SCNC: 102 MMOL/L — SIGNIFICANT CHANGE UP (ref 96–108)
CO2 SERPL-SCNC: 26 MMOL/L — SIGNIFICANT CHANGE UP (ref 22–31)
CREAT SERPL-MCNC: 0.95 MG/DL — SIGNIFICANT CHANGE UP (ref 0.5–1.3)
GLUCOSE SERPL-MCNC: 97 MG/DL — SIGNIFICANT CHANGE UP (ref 70–99)
HCT VFR BLD CALC: 28.6 % — LOW (ref 39–50)
HGB BLD-MCNC: 9.4 G/DL — LOW (ref 13–17)
LIDOCAIN IGE QN: 114 U/L — HIGH (ref 7–60)
MAGNESIUM SERPL-MCNC: 1.9 MG/DL — SIGNIFICANT CHANGE UP (ref 1.6–2.6)
MCHC RBC-ENTMCNC: 30.8 PG — SIGNIFICANT CHANGE UP (ref 27–34)
MCHC RBC-ENTMCNC: 32.9 GM/DL — SIGNIFICANT CHANGE UP (ref 32–36)
MCV RBC AUTO: 93.8 FL — SIGNIFICANT CHANGE UP (ref 80–100)
NRBC # BLD: 0 /100 WBCS — SIGNIFICANT CHANGE UP (ref 0–0)
PHOSPHATE SERPL-MCNC: 3.3 MG/DL — SIGNIFICANT CHANGE UP (ref 2.5–4.5)
PLATELET # BLD AUTO: 283 K/UL — SIGNIFICANT CHANGE UP (ref 150–400)
POTASSIUM SERPL-MCNC: 4.5 MMOL/L — SIGNIFICANT CHANGE UP (ref 3.5–5.3)
POTASSIUM SERPL-SCNC: 4.5 MMOL/L — SIGNIFICANT CHANGE UP (ref 3.5–5.3)
PROT SERPL-MCNC: 5.7 G/DL — LOW (ref 6–8.3)
RBC # BLD: 3.05 M/UL — LOW (ref 4.2–5.8)
RBC # FLD: 15.6 % — HIGH (ref 10.3–14.5)
SODIUM SERPL-SCNC: 136 MMOL/L — SIGNIFICANT CHANGE UP (ref 135–145)
WBC # BLD: 5.16 K/UL — SIGNIFICANT CHANGE UP (ref 3.8–10.5)
WBC # FLD AUTO: 5.16 K/UL — SIGNIFICANT CHANGE UP (ref 3.8–10.5)

## 2020-09-18 RX ORDER — DEXTROSE MONOHYDRATE, SODIUM CHLORIDE, AND POTASSIUM CHLORIDE 50; .745; 4.5 G/1000ML; G/1000ML; G/1000ML
1000 INJECTION, SOLUTION INTRAVENOUS
Refills: 0 | Status: DISCONTINUED | OUTPATIENT
Start: 2020-09-18 | End: 2020-09-18

## 2020-09-18 RX ADMIN — HYDROMORPHONE HYDROCHLORIDE 1 MILLIGRAM(S): 2 INJECTION INTRAMUSCULAR; INTRAVENOUS; SUBCUTANEOUS at 06:13

## 2020-09-18 RX ADMIN — HYDROMORPHONE HYDROCHLORIDE 1 MILLIGRAM(S): 2 INJECTION INTRAMUSCULAR; INTRAVENOUS; SUBCUTANEOUS at 10:45

## 2020-09-18 RX ADMIN — AMLODIPINE BESYLATE 10 MILLIGRAM(S): 2.5 TABLET ORAL at 05:43

## 2020-09-18 RX ADMIN — HYDROMORPHONE HYDROCHLORIDE 1 MILLIGRAM(S): 2 INJECTION INTRAMUSCULAR; INTRAVENOUS; SUBCUTANEOUS at 18:37

## 2020-09-18 RX ADMIN — Medication 1000 MILLIGRAM(S): at 20:30

## 2020-09-18 RX ADMIN — HYDROMORPHONE HYDROCHLORIDE 1 MILLIGRAM(S): 2 INJECTION INTRAMUSCULAR; INTRAVENOUS; SUBCUTANEOUS at 22:50

## 2020-09-18 RX ADMIN — DEXTROSE MONOHYDRATE, SODIUM CHLORIDE, AND POTASSIUM CHLORIDE 100 MILLILITER(S): 50; .745; 4.5 INJECTION, SOLUTION INTRAVENOUS at 08:36

## 2020-09-18 RX ADMIN — HYDROMORPHONE HYDROCHLORIDE 1 MILLIGRAM(S): 2 INJECTION INTRAMUSCULAR; INTRAVENOUS; SUBCUTANEOUS at 14:35

## 2020-09-18 RX ADMIN — Medication 150 MICROGRAM(S): at 05:43

## 2020-09-18 RX ADMIN — HYDROMORPHONE HYDROCHLORIDE 1 MILLIGRAM(S): 2 INJECTION INTRAMUSCULAR; INTRAVENOUS; SUBCUTANEOUS at 01:04

## 2020-09-18 RX ADMIN — PANTOPRAZOLE SODIUM 40 MILLIGRAM(S): 20 TABLET, DELAYED RELEASE ORAL at 05:44

## 2020-09-18 RX ADMIN — HYDROMORPHONE HYDROCHLORIDE 1 MILLIGRAM(S): 2 INJECTION INTRAMUSCULAR; INTRAVENOUS; SUBCUTANEOUS at 05:43

## 2020-09-18 RX ADMIN — HYDROMORPHONE HYDROCHLORIDE 1 MILLIGRAM(S): 2 INJECTION INTRAMUSCULAR; INTRAVENOUS; SUBCUTANEOUS at 19:13

## 2020-09-18 RX ADMIN — HYDROMORPHONE HYDROCHLORIDE 1 MILLIGRAM(S): 2 INJECTION INTRAMUSCULAR; INTRAVENOUS; SUBCUTANEOUS at 00:34

## 2020-09-18 RX ADMIN — HYDROMORPHONE HYDROCHLORIDE 1 MILLIGRAM(S): 2 INJECTION INTRAMUSCULAR; INTRAVENOUS; SUBCUTANEOUS at 10:16

## 2020-09-18 RX ADMIN — HYDROMORPHONE HYDROCHLORIDE 1 MILLIGRAM(S): 2 INJECTION INTRAMUSCULAR; INTRAVENOUS; SUBCUTANEOUS at 23:20

## 2020-09-18 RX ADMIN — HYDROMORPHONE HYDROCHLORIDE 1 MILLIGRAM(S): 2 INJECTION INTRAMUSCULAR; INTRAVENOUS; SUBCUTANEOUS at 16:00

## 2020-09-18 RX ADMIN — ENOXAPARIN SODIUM 40 MILLIGRAM(S): 100 INJECTION SUBCUTANEOUS at 12:04

## 2020-09-18 RX ADMIN — Medication 400 MILLIGRAM(S): at 20:00

## 2020-09-18 NOTE — PROGRESS NOTE ADULT - ASSESSMENT
54y old Male with HTN, GERD, Hypothyroidism, PTSD, Alcoholic pancreatitis who presents with progressive abdominal pain found to have new fluid collection in the lesser sac and enlarging fluid collection above the spleen, likely 2/2 recurrent necrotizing pancreatitis.     Plan:  - MRCP demonstrated continued walled off necrosis of the tail, with ductal dilatation and irregularity, concerning for duct disruption.   - plan for possible surgical intervention for ductal disruption as outpatient. No surgical intervention indicated during acute stage.  - c/w maintenance fluid- pt appears to be adequately resuscitated appropriate urinary output and HD stable.  - c/w CLD  - GI recs no indication for EUS given acute fluid collections  - DVt ppx        Blue Sx 9001

## 2020-09-19 LAB
ALBUMIN SERPL ELPH-MCNC: 2.8 G/DL — LOW (ref 3.3–5)
ALP SERPL-CCNC: 77 U/L — SIGNIFICANT CHANGE UP (ref 40–120)
ALT FLD-CCNC: 6 U/L — LOW (ref 10–45)
ANION GAP SERPL CALC-SCNC: 9 MMOL/L — SIGNIFICANT CHANGE UP (ref 5–17)
AST SERPL-CCNC: 14 U/L — SIGNIFICANT CHANGE UP (ref 10–40)
BILIRUB SERPL-MCNC: 0.4 MG/DL — SIGNIFICANT CHANGE UP (ref 0.2–1.2)
BUN SERPL-MCNC: 6 MG/DL — LOW (ref 7–23)
CALCIUM SERPL-MCNC: 8.9 MG/DL — SIGNIFICANT CHANGE UP (ref 8.4–10.5)
CHLORIDE SERPL-SCNC: 103 MMOL/L — SIGNIFICANT CHANGE UP (ref 96–108)
CO2 SERPL-SCNC: 24 MMOL/L — SIGNIFICANT CHANGE UP (ref 22–31)
CREAT SERPL-MCNC: 0.8 MG/DL — SIGNIFICANT CHANGE UP (ref 0.5–1.3)
GLUCOSE SERPL-MCNC: 110 MG/DL — HIGH (ref 70–99)
HCT VFR BLD CALC: 29 % — LOW (ref 39–50)
HGB BLD-MCNC: 9.4 G/DL — LOW (ref 13–17)
LIDOCAIN IGE QN: 283 U/L — HIGH (ref 7–60)
MAGNESIUM SERPL-MCNC: 2 MG/DL — SIGNIFICANT CHANGE UP (ref 1.6–2.6)
MCHC RBC-ENTMCNC: 30.6 PG — SIGNIFICANT CHANGE UP (ref 27–34)
MCHC RBC-ENTMCNC: 32.4 GM/DL — SIGNIFICANT CHANGE UP (ref 32–36)
MCV RBC AUTO: 94.5 FL — SIGNIFICANT CHANGE UP (ref 80–100)
NRBC # BLD: 0 /100 WBCS — SIGNIFICANT CHANGE UP (ref 0–0)
PHOSPHATE SERPL-MCNC: 3.6 MG/DL — SIGNIFICANT CHANGE UP (ref 2.5–4.5)
PLATELET # BLD AUTO: 326 K/UL — SIGNIFICANT CHANGE UP (ref 150–400)
POTASSIUM SERPL-MCNC: 4 MMOL/L — SIGNIFICANT CHANGE UP (ref 3.5–5.3)
POTASSIUM SERPL-SCNC: 4 MMOL/L — SIGNIFICANT CHANGE UP (ref 3.5–5.3)
PROT SERPL-MCNC: 5.9 G/DL — LOW (ref 6–8.3)
RBC # BLD: 3.07 M/UL — LOW (ref 4.2–5.8)
RBC # FLD: 15.9 % — HIGH (ref 10.3–14.5)
SODIUM SERPL-SCNC: 136 MMOL/L — SIGNIFICANT CHANGE UP (ref 135–145)
WBC # BLD: 5.21 K/UL — SIGNIFICANT CHANGE UP (ref 3.8–10.5)
WBC # FLD AUTO: 5.21 K/UL — SIGNIFICANT CHANGE UP (ref 3.8–10.5)

## 2020-09-19 RX ADMIN — HYDROMORPHONE HYDROCHLORIDE 1 MILLIGRAM(S): 2 INJECTION INTRAMUSCULAR; INTRAVENOUS; SUBCUTANEOUS at 19:47

## 2020-09-19 RX ADMIN — HYDROMORPHONE HYDROCHLORIDE 1 MILLIGRAM(S): 2 INJECTION INTRAMUSCULAR; INTRAVENOUS; SUBCUTANEOUS at 07:38

## 2020-09-19 RX ADMIN — ENOXAPARIN SODIUM 40 MILLIGRAM(S): 100 INJECTION SUBCUTANEOUS at 11:52

## 2020-09-19 RX ADMIN — Medication 400 MILLIGRAM(S): at 17:54

## 2020-09-19 RX ADMIN — HYDROMORPHONE HYDROCHLORIDE 1 MILLIGRAM(S): 2 INJECTION INTRAMUSCULAR; INTRAVENOUS; SUBCUTANEOUS at 03:18

## 2020-09-19 RX ADMIN — HYDROMORPHONE HYDROCHLORIDE 1 MILLIGRAM(S): 2 INJECTION INTRAMUSCULAR; INTRAVENOUS; SUBCUTANEOUS at 16:00

## 2020-09-19 RX ADMIN — Medication 150 MICROGRAM(S): at 06:18

## 2020-09-19 RX ADMIN — Medication 400 MILLIGRAM(S): at 09:11

## 2020-09-19 RX ADMIN — HYDROMORPHONE HYDROCHLORIDE 1 MILLIGRAM(S): 2 INJECTION INTRAMUSCULAR; INTRAVENOUS; SUBCUTANEOUS at 02:48

## 2020-09-19 RX ADMIN — PANTOPRAZOLE SODIUM 40 MILLIGRAM(S): 20 TABLET, DELAYED RELEASE ORAL at 06:18

## 2020-09-19 RX ADMIN — HYDROMORPHONE HYDROCHLORIDE 1 MILLIGRAM(S): 2 INJECTION INTRAMUSCULAR; INTRAVENOUS; SUBCUTANEOUS at 12:00

## 2020-09-19 RX ADMIN — HYDROMORPHONE HYDROCHLORIDE 1 MILLIGRAM(S): 2 INJECTION INTRAMUSCULAR; INTRAVENOUS; SUBCUTANEOUS at 15:42

## 2020-09-19 RX ADMIN — HYDROMORPHONE HYDROCHLORIDE 1 MILLIGRAM(S): 2 INJECTION INTRAMUSCULAR; INTRAVENOUS; SUBCUTANEOUS at 11:45

## 2020-09-19 RX ADMIN — HYDROMORPHONE HYDROCHLORIDE 1 MILLIGRAM(S): 2 INJECTION INTRAMUSCULAR; INTRAVENOUS; SUBCUTANEOUS at 20:30

## 2020-09-19 RX ADMIN — AMLODIPINE BESYLATE 10 MILLIGRAM(S): 2.5 TABLET ORAL at 06:18

## 2020-09-19 NOTE — PROGRESS NOTE ADULT - ASSESSMENT
Pt is a 55yo M with HTN, GERD, Hypothyroidism, PTSD, Alcoholic pancreatitis who presents with progressive abdominal pain found to have new fluid collection in the lesser sac and enlarging fluid collection above the spleen, likely 2/2 recurrent necrotizing pancreatitis.     Plan:   - plan for possible surgical intervention for ductal disruption as outpatient. No surgical intervention indicated during acute stage.  - c/w maintenance fluid- pt appears to be adequately resuscitated appropriate urinary output and HD stable.  - regular diet  - f/u lipase  - GI recs no indication for EUS given acute fluid collections  - DVt ppx Pt is a 55yo M with HTN, GERD, Hypothyroidism, PTSD, Alcoholic pancreatitis who presents with progressive abdominal pain found to have new fluid collection in the lesser sac and enlarging fluid collection above the spleen, likely 2/2 recurrent necrotizing pancreatitis.     Plan:   - plan for possible surgical intervention for ductal disruption as outpatient. No surgical intervention indicated during acute stage.  - regular diet  - f/u lipase  - GI recs no indication for EUS given acute fluid collections  - DVT ppx

## 2020-09-20 LAB
ALBUMIN SERPL ELPH-MCNC: 3.5 G/DL — SIGNIFICANT CHANGE UP (ref 3.3–5)
ALP SERPL-CCNC: 88 U/L — SIGNIFICANT CHANGE UP (ref 40–120)
ALT FLD-CCNC: 7 U/L — LOW (ref 10–45)
ANION GAP SERPL CALC-SCNC: 11 MMOL/L — SIGNIFICANT CHANGE UP (ref 5–17)
AST SERPL-CCNC: 12 U/L — SIGNIFICANT CHANGE UP (ref 10–40)
BILIRUB SERPL-MCNC: 0.5 MG/DL — SIGNIFICANT CHANGE UP (ref 0.2–1.2)
BUN SERPL-MCNC: 4 MG/DL — LOW (ref 7–23)
CALCIUM SERPL-MCNC: 9.6 MG/DL — SIGNIFICANT CHANGE UP (ref 8.4–10.5)
CHLORIDE SERPL-SCNC: 104 MMOL/L — SIGNIFICANT CHANGE UP (ref 96–108)
CO2 SERPL-SCNC: 24 MMOL/L — SIGNIFICANT CHANGE UP (ref 22–31)
CREAT SERPL-MCNC: 0.75 MG/DL — SIGNIFICANT CHANGE UP (ref 0.5–1.3)
GLUCOSE SERPL-MCNC: 92 MG/DL — SIGNIFICANT CHANGE UP (ref 70–99)
HCT VFR BLD CALC: 33.1 % — LOW (ref 39–50)
HGB BLD-MCNC: 10.8 G/DL — LOW (ref 13–17)
LIDOCAIN IGE QN: 73 U/L — HIGH (ref 7–60)
MAGNESIUM SERPL-MCNC: 2 MG/DL — SIGNIFICANT CHANGE UP (ref 1.6–2.6)
MCHC RBC-ENTMCNC: 30.8 PG — SIGNIFICANT CHANGE UP (ref 27–34)
MCHC RBC-ENTMCNC: 32.6 GM/DL — SIGNIFICANT CHANGE UP (ref 32–36)
MCV RBC AUTO: 94.3 FL — SIGNIFICANT CHANGE UP (ref 80–100)
NRBC # BLD: 0 /100 WBCS — SIGNIFICANT CHANGE UP (ref 0–0)
PHOSPHATE SERPL-MCNC: 3.4 MG/DL — SIGNIFICANT CHANGE UP (ref 2.5–4.5)
PLATELET # BLD AUTO: 371 K/UL — SIGNIFICANT CHANGE UP (ref 150–400)
POTASSIUM SERPL-MCNC: 4.5 MMOL/L — SIGNIFICANT CHANGE UP (ref 3.5–5.3)
POTASSIUM SERPL-SCNC: 4.5 MMOL/L — SIGNIFICANT CHANGE UP (ref 3.5–5.3)
PROT SERPL-MCNC: 6.9 G/DL — SIGNIFICANT CHANGE UP (ref 6–8.3)
RBC # BLD: 3.51 M/UL — LOW (ref 4.2–5.8)
RBC # FLD: 15.9 % — HIGH (ref 10.3–14.5)
SODIUM SERPL-SCNC: 139 MMOL/L — SIGNIFICANT CHANGE UP (ref 135–145)
WBC # BLD: 5.72 K/UL — SIGNIFICANT CHANGE UP (ref 3.8–10.5)
WBC # FLD AUTO: 5.72 K/UL — SIGNIFICANT CHANGE UP (ref 3.8–10.5)

## 2020-09-20 RX ORDER — ACETAMINOPHEN 500 MG
1000 TABLET ORAL EVERY 6 HOURS
Refills: 0 | Status: DISCONTINUED | OUTPATIENT
Start: 2020-09-20 | End: 2020-09-22

## 2020-09-20 RX ADMIN — Medication 1000 MILLIGRAM(S): at 21:30

## 2020-09-20 RX ADMIN — HYDROMORPHONE HYDROCHLORIDE 1 MILLIGRAM(S): 2 INJECTION INTRAMUSCULAR; INTRAVENOUS; SUBCUTANEOUS at 00:41

## 2020-09-20 RX ADMIN — HYDROMORPHONE HYDROCHLORIDE 1 MILLIGRAM(S): 2 INJECTION INTRAMUSCULAR; INTRAVENOUS; SUBCUTANEOUS at 09:33

## 2020-09-20 RX ADMIN — Medication 150 MICROGRAM(S): at 05:36

## 2020-09-20 RX ADMIN — HYDROMORPHONE HYDROCHLORIDE 1 MILLIGRAM(S): 2 INJECTION INTRAMUSCULAR; INTRAVENOUS; SUBCUTANEOUS at 14:00

## 2020-09-20 RX ADMIN — HYDROMORPHONE HYDROCHLORIDE 1 MILLIGRAM(S): 2 INJECTION INTRAMUSCULAR; INTRAVENOUS; SUBCUTANEOUS at 22:07

## 2020-09-20 RX ADMIN — HYDROMORPHONE HYDROCHLORIDE 1 MILLIGRAM(S): 2 INJECTION INTRAMUSCULAR; INTRAVENOUS; SUBCUTANEOUS at 06:36

## 2020-09-20 RX ADMIN — ENOXAPARIN SODIUM 40 MILLIGRAM(S): 100 INJECTION SUBCUTANEOUS at 13:40

## 2020-09-20 RX ADMIN — Medication 400 MILLIGRAM(S): at 02:30

## 2020-09-20 RX ADMIN — Medication 1000 MILLIGRAM(S): at 03:00

## 2020-09-20 RX ADMIN — Medication 1000 MILLIGRAM(S): at 20:37

## 2020-09-20 RX ADMIN — HYDROMORPHONE HYDROCHLORIDE 1 MILLIGRAM(S): 2 INJECTION INTRAMUSCULAR; INTRAVENOUS; SUBCUTANEOUS at 13:37

## 2020-09-20 RX ADMIN — HYDROMORPHONE HYDROCHLORIDE 1 MILLIGRAM(S): 2 INJECTION INTRAMUSCULAR; INTRAVENOUS; SUBCUTANEOUS at 22:45

## 2020-09-20 RX ADMIN — PANTOPRAZOLE SODIUM 40 MILLIGRAM(S): 20 TABLET, DELAYED RELEASE ORAL at 05:36

## 2020-09-20 RX ADMIN — HYDROMORPHONE HYDROCHLORIDE 1 MILLIGRAM(S): 2 INJECTION INTRAMUSCULAR; INTRAVENOUS; SUBCUTANEOUS at 05:36

## 2020-09-20 RX ADMIN — HYDROMORPHONE HYDROCHLORIDE 1 MILLIGRAM(S): 2 INJECTION INTRAMUSCULAR; INTRAVENOUS; SUBCUTANEOUS at 10:10

## 2020-09-20 RX ADMIN — HYDROMORPHONE HYDROCHLORIDE 1 MILLIGRAM(S): 2 INJECTION INTRAMUSCULAR; INTRAVENOUS; SUBCUTANEOUS at 17:27

## 2020-09-20 RX ADMIN — AMLODIPINE BESYLATE 10 MILLIGRAM(S): 2.5 TABLET ORAL at 05:36

## 2020-09-20 RX ADMIN — HYDROMORPHONE HYDROCHLORIDE 1 MILLIGRAM(S): 2 INJECTION INTRAMUSCULAR; INTRAVENOUS; SUBCUTANEOUS at 01:30

## 2020-09-20 NOTE — PROGRESS NOTE ADULT - ASSESSMENT
53yo M with HTN, GERD, Hypothyroidism, PTSD, Alcoholic pancreatitis who presents with progressive abdominal pain found to have new fluid collection in the lesser sac and enlarging fluid collection above the spleen, likely 2/2 recurrent necrotizing pancreatitis.     Plan:   - plan for possible surgical intervention for ductal disruption as outpatient. No surgical intervention indicated during acute stage.  - regular diet  - f/u lipase  - GI recs no indication for EUS given acute fluid collections  - DVT ppx

## 2020-09-21 LAB
ALBUMIN SERPL ELPH-MCNC: 3.3 G/DL — SIGNIFICANT CHANGE UP (ref 3.3–5)
ALP SERPL-CCNC: 78 U/L — SIGNIFICANT CHANGE UP (ref 40–120)
ALT FLD-CCNC: 8 U/L — LOW (ref 10–45)
ANION GAP SERPL CALC-SCNC: 9 MMOL/L — SIGNIFICANT CHANGE UP (ref 5–17)
AST SERPL-CCNC: 13 U/L — SIGNIFICANT CHANGE UP (ref 10–40)
BILIRUB SERPL-MCNC: 0.2 MG/DL — SIGNIFICANT CHANGE UP (ref 0.2–1.2)
BUN SERPL-MCNC: 7 MG/DL — SIGNIFICANT CHANGE UP (ref 7–23)
CALCIUM SERPL-MCNC: 9.4 MG/DL — SIGNIFICANT CHANGE UP (ref 8.4–10.5)
CHLORIDE SERPL-SCNC: 105 MMOL/L — SIGNIFICANT CHANGE UP (ref 96–108)
CO2 SERPL-SCNC: 23 MMOL/L — SIGNIFICANT CHANGE UP (ref 22–31)
CREAT SERPL-MCNC: 0.8 MG/DL — SIGNIFICANT CHANGE UP (ref 0.5–1.3)
GLUCOSE SERPL-MCNC: 77 MG/DL — SIGNIFICANT CHANGE UP (ref 70–99)
HCT VFR BLD CALC: 31.5 % — LOW (ref 39–50)
HGB BLD-MCNC: 10 G/DL — LOW (ref 13–17)
LIDOCAIN IGE QN: 99 U/L — HIGH (ref 7–60)
MAGNESIUM SERPL-MCNC: 2.1 MG/DL — SIGNIFICANT CHANGE UP (ref 1.6–2.6)
MCHC RBC-ENTMCNC: 29.9 PG — SIGNIFICANT CHANGE UP (ref 27–34)
MCHC RBC-ENTMCNC: 31.7 GM/DL — LOW (ref 32–36)
MCV RBC AUTO: 94 FL — SIGNIFICANT CHANGE UP (ref 80–100)
NRBC # BLD: 0 /100 WBCS — SIGNIFICANT CHANGE UP (ref 0–0)
PHOSPHATE SERPL-MCNC: 3.5 MG/DL — SIGNIFICANT CHANGE UP (ref 2.5–4.5)
PLATELET # BLD AUTO: 382 K/UL — SIGNIFICANT CHANGE UP (ref 150–400)
POTASSIUM SERPL-MCNC: 3.9 MMOL/L — SIGNIFICANT CHANGE UP (ref 3.5–5.3)
POTASSIUM SERPL-SCNC: 3.9 MMOL/L — SIGNIFICANT CHANGE UP (ref 3.5–5.3)
PROT SERPL-MCNC: 6.3 G/DL — SIGNIFICANT CHANGE UP (ref 6–8.3)
RBC # BLD: 3.35 M/UL — LOW (ref 4.2–5.8)
RBC # FLD: 15.8 % — HIGH (ref 10.3–14.5)
SODIUM SERPL-SCNC: 137 MMOL/L — SIGNIFICANT CHANGE UP (ref 135–145)
WBC # BLD: 4.12 K/UL — SIGNIFICANT CHANGE UP (ref 3.8–10.5)
WBC # FLD AUTO: 4.12 K/UL — SIGNIFICANT CHANGE UP (ref 3.8–10.5)

## 2020-09-21 PROCEDURE — 99222 1ST HOSP IP/OBS MODERATE 55: CPT

## 2020-09-21 RX ORDER — OXYCODONE HYDROCHLORIDE 5 MG/1
10 TABLET ORAL EVERY 4 HOURS
Refills: 0 | Status: DISCONTINUED | OUTPATIENT
Start: 2020-09-21 | End: 2020-09-22

## 2020-09-21 RX ORDER — HYDROMORPHONE HYDROCHLORIDE 2 MG/ML
4 INJECTION INTRAMUSCULAR; INTRAVENOUS; SUBCUTANEOUS EVERY 4 HOURS
Refills: 0 | Status: DISCONTINUED | OUTPATIENT
Start: 2020-09-21 | End: 2020-09-21

## 2020-09-21 RX ORDER — IBUPROFEN 200 MG
600 TABLET ORAL EVERY 6 HOURS
Refills: 0 | Status: DISCONTINUED | OUTPATIENT
Start: 2020-09-21 | End: 2020-09-22

## 2020-09-21 RX ORDER — HYDROMORPHONE HYDROCHLORIDE 2 MG/ML
2 INJECTION INTRAMUSCULAR; INTRAVENOUS; SUBCUTANEOUS EVERY 4 HOURS
Refills: 0 | Status: DISCONTINUED | OUTPATIENT
Start: 2020-09-21 | End: 2020-09-21

## 2020-09-21 RX ADMIN — PANTOPRAZOLE SODIUM 40 MILLIGRAM(S): 20 TABLET, DELAYED RELEASE ORAL at 05:35

## 2020-09-21 RX ADMIN — OXYCODONE HYDROCHLORIDE 10 MILLIGRAM(S): 5 TABLET ORAL at 22:30

## 2020-09-21 RX ADMIN — Medication 600 MILLIGRAM(S): at 17:10

## 2020-09-21 RX ADMIN — Medication 600 MILLIGRAM(S): at 17:40

## 2020-09-21 RX ADMIN — HYDROMORPHONE HYDROCHLORIDE 1 MILLIGRAM(S): 2 INJECTION INTRAMUSCULAR; INTRAVENOUS; SUBCUTANEOUS at 03:24

## 2020-09-21 RX ADMIN — HYDROMORPHONE HYDROCHLORIDE 1 MILLIGRAM(S): 2 INJECTION INTRAMUSCULAR; INTRAVENOUS; SUBCUTANEOUS at 13:25

## 2020-09-21 RX ADMIN — HYDROMORPHONE HYDROCHLORIDE 1 MILLIGRAM(S): 2 INJECTION INTRAMUSCULAR; INTRAVENOUS; SUBCUTANEOUS at 08:20

## 2020-09-21 RX ADMIN — OXYCODONE HYDROCHLORIDE 10 MILLIGRAM(S): 5 TABLET ORAL at 21:39

## 2020-09-21 RX ADMIN — ENOXAPARIN SODIUM 40 MILLIGRAM(S): 100 INJECTION SUBCUTANEOUS at 13:08

## 2020-09-21 RX ADMIN — Medication 1000 MILLIGRAM(S): at 05:35

## 2020-09-21 RX ADMIN — HYDROMORPHONE HYDROCHLORIDE 1 MILLIGRAM(S): 2 INJECTION INTRAMUSCULAR; INTRAVENOUS; SUBCUTANEOUS at 16:58

## 2020-09-21 RX ADMIN — Medication 1000 MILLIGRAM(S): at 06:17

## 2020-09-21 RX ADMIN — HYDROMORPHONE HYDROCHLORIDE 1 MILLIGRAM(S): 2 INJECTION INTRAMUSCULAR; INTRAVENOUS; SUBCUTANEOUS at 08:00

## 2020-09-21 RX ADMIN — HYDROMORPHONE HYDROCHLORIDE 1 MILLIGRAM(S): 2 INJECTION INTRAMUSCULAR; INTRAVENOUS; SUBCUTANEOUS at 17:15

## 2020-09-21 RX ADMIN — HYDROMORPHONE HYDROCHLORIDE 1 MILLIGRAM(S): 2 INJECTION INTRAMUSCULAR; INTRAVENOUS; SUBCUTANEOUS at 13:02

## 2020-09-21 RX ADMIN — HYDROMORPHONE HYDROCHLORIDE 1 MILLIGRAM(S): 2 INJECTION INTRAMUSCULAR; INTRAVENOUS; SUBCUTANEOUS at 04:00

## 2020-09-21 RX ADMIN — Medication 150 MICROGRAM(S): at 05:35

## 2020-09-21 NOTE — CONSULT NOTE ADULT - ASSESSMENT
54M PMHx HTN, GERD, Hypothyroidism, PTSD, Alcoholic pancreatitis, chronic back pain per patient  Admitted w/ abdominal pain, found to have new fluid collection in the lesser sac and enlarging fluid collection above the spleen, likely 2/2 recurrent necrotizing pancreatitis.     Plan discussed with primary team:  Oxycodone 10 mg every 4 hours - on discharge order maximum 5 per day  Lidoderm  Warm/cool packs for comfort  Bowel Regimen  Monitor for sedation, respiratory depression  Out-patient pain practice list provided for pain management after discharge - marked Dr. Kang and Dr. Sevilla as recommendations (close to home)      Time spent on encounter:   40 minutes     Minutes    [facility]  [phone]

## 2020-09-21 NOTE — DIETITIAN INITIAL EVALUATION ADULT. - OTHER INFO
Pertinent Information: This is a 53yo M with HTN, GERD, Hypothyroidism, PTSD, Alcoholic pancreatitis who presents with progressive abdominal pain found to have new fluid collection in the lesser sac and enlarging fluid collection above the spleen, likely 2/2 recurrent necrotizing pancreatitis. Discharge planning.     Pt reports decreased PO intake and appetite PTA due to intermittent abdominal pain. Reports he did still try to have 3 meals per day consisting of a variety of foods but does report having smaller portions. Confirms NKFA.  Patient with no nutrition-related questions at this time. Made aware RD remains available as needed. Denies micronutrient supplementation.     Weights: pt endorses weight loss over the last 3 months. Reports UBW as ~178-180lbs back in June. Weight per [facility] noted as 172lbs (6/11/20). Current dosing weight is ~168.7lbs suggesting ~ 10lb weight loss x 3 months.     Since admission pt reports variable appetite and intake due to abdominal discomfort, reports feeling "cramps" after eating. Says he typically eats a good breakfast, however lunch and dinner more variable. Reports he has been trying to eat smaller, more frequent meals. Pt consumed 100% of french toast for breakfast,  and will have dry cereal with banana later. Plans to order something lite for dinner such as sandwich. No nausea or emesis noted, last BM 9/21 per pt report. Pt encouraged to continue consuming smaller more frequent meals as able, setting aside non-perishable foods to consume between meals. Pt also encouraged to avoid high fat, greasy foods to optimize tolerance to meals. Patient with no additional nutrition-related questions at this time. Made aware RD remains available as needed.

## 2020-09-21 NOTE — CONSULT NOTE ADULT - SUBJECTIVE AND OBJECTIVE BOX
Chief Complaint:  Patient is a 54y old  Male who presents with a chief complaint of Pancreatitis with fluid collection (21 Sep 2020 07:25)    HPI:  Patient is a 54y old Male with HTN, GERD, Hypothyroidism, PTSD, Alcoholic pancreatitis who presents with a chief complaint of abdominal pain. Patient was previously admitted to the hospital on 8/5 for pancreatitis with fluid collection of 4cm, he underwent MRCP on 8/6 showing collection of 3.6cm at the uncinate process. He was discharged after improvement in symptoms and underwent outpatient ERCP which showed 3.6 cm and was too small to do cystgastotomy. He presents again today reporting abdomial pain in his epigastric and LLQ. patient reports passing flatus and having bowel movement. Denies nausea, vomiting, SOB, CP, dizziness    (15 Sep 2020 15:22)    Current out- patient pain regimen: None    Out Patient Pain Management provider: Dr. Marlon ZALDIVAR Prescription Monitoring Program: Reference #[id number]    Opioid Risk Tool (ORT-OUD) Score: High  Pt and father EtOH, patient with PTSD "from 9/11"    Pain Score: 6/10    Pt seen sitting in chair, appears comfortable. Pt expressing concern for pain management on discharge. States he has chronic back pain and recently had "surgery" on his back, "injections for filler" and "fusion". On further inquiry its possible he may have had some ambulatory, interventional procedure. Pt states he had it entire spine but no surgical incisions noted. States he has RLE weakness and numbness B/L toes. Also C/O acute left upper quadrant pain that radiates to back. States he has good effect from IV Dilaudid 1 mg. Pain reaches 12/10 but the medication brings it down to 6/10.  Pt denies any other issues. Denies diarrhea, constipation, incontinence; denies bladder incontinence/retention.    PHYSICAL EXAM  GENERAL: Seen at bedside, NAD, well-groomed, well-developed, no signs of toxicity  NEURO: Alert & Oriented X3, Good concentration; sensory exam normal   HEENT: Head normocephalic; speech clear and fluid  RESP: Lungs Clear to auscultation bilaterally; no rales or rhonchi  CV: Regular rate and rhythm  GI: Abdomen Soft, Nontender, Nondistended; Bowel sounds present  EXTREMITIES: 2+ Peripheral Pulses, No cyanosis or edema; moves all extremities equally against gravity  MSK: Motor Strength 5/5 B/L upper and lower extremities except right plantar dorsiflexion 3/5; ROM intact; no paravertebral tenderness; no tenderness on spinal palpation; no muscle spasm  SKIN: No rashes or lesions  PSYCH: affect normal; good eye contact; no signs of depression or anxiety    PAST MEDICAL & SURGICAL HISTORY:  Pancreatitis    Hypertension    Hernia    Hypothyroid    History of back surgery        FAMILY HISTORY:  No pertinent family history in first degree relatives        Allergies    No Known Allergies        MEDICATIONS  (STANDING):  amLODIPine   Tablet 10 milliGRAM(s) Oral daily  enoxaparin Injectable 40 milliGRAM(s) SubCutaneous daily  influenza   Vaccine 0.5 milliLiter(s) IntraMuscular once  levothyroxine 150 MICROGram(s) Oral daily  pantoprazole    Tablet 40 milliGRAM(s) Oral before breakfast    MEDICATIONS  (PRN):  acetaminophen   Tablet .. 1000 milliGRAM(s) Oral every 6 hours PRN Mild Pain (1 - 3)  HYDROmorphone   Tablet 2 milliGRAM(s) Oral every 4 hours PRN Severe Pain (7 - 10)  HYDROmorphone   Tablet 4 milliGRAM(s) Oral every 4 hours PRN Severe Pain (7 - 10)  ibuprofen  Tablet. 600 milliGRAM(s) Oral every 6 hours PRN Moderate Pain (4 - 6)      Vital Signs:  T(C): 37.1 (09-21-20 @ 13:13)  HR: 103 (09-21-20 @ 13:13)  BP: 129/81 (09-21-20 @ 13:13)  RR: 18 (09-21-20 @ 13:13)  SpO2: 98% (09-21-20 @ 13:13)    Pertinent labs/radiology:  Reviewed                          10.0   4.12  )-----------( 382      ( 21 Sep 2020 07:11 )             31.5       09-21    137  |  105  |  7   ----------------------------<  77  3.9   |  23  |  0.80    Ca    9.4      21 Sep 2020 07:11  Phos  3.5     09-21  Mg     2.1     09-21    TPro  6.3  /  Alb  3.3  /  TBili  0.2  /  DBili  x   /  AST  13  /  ALT  8<L>  /  AlkPhos  78  09-21     Chief Complaint:  Patient is a 54y old  Male who presents with a chief complaint of Pancreatitis with fluid collection (21 Sep 2020 07:25)    HPI:  Patient is a 54y old Male with HTN, GERD, Hypothyroidism, PTSD, Alcoholic pancreatitis who presents with a chief complaint of abdominal pain. Patient was previously admitted to the hospital on 8/5 for pancreatitis with fluid collection of 4cm, he underwent MRCP on 8/6 showing collection of 3.6cm at the uncinate process. He was discharged after improvement in symptoms and underwent outpatient ERCP which showed 3.6 cm and was too small to do cystgastotomy. He presents again today reporting abdomial pain in his epigastric and LLQ. patient reports passing flatus and having bowel movement. Denies nausea, vomiting, SOB, CP, dizziness    (15 Sep 2020 15:22)    Current out- patient pain regimen: None    Out Patient Pain Management provider: Dr. Marlon ZALDIVAR Prescription Monitoring Program: Reference #[id number]    Opioid Risk Tool (ORT-OUD) Score: High  Pt and father EtOH, patient with PTSD "from 9/11"    Pain Score: 6/10    Pt seen sitting in chair, appears comfortable. Pt expressing concern for pain management on discharge. States he has chronic back pain and recently had "surgery" on his back, "injections for filler" and "fusion". On further inquiry its possible he may have had some ambulatory, interventional procedure. Pt states he had it entire spine but no surgical incisions noted. States he has RLE weakness and numbness B/L toes. Ambulates with cane or walker.  Also C/O acute left upper quadrant pain that radiates to back. States he has good effect from IV Dilaudid 1 mg. Pain reaches 12/10 but the medication brings it down to 6/10.  Pt denies any other issues. Denies diarrhea, constipation, incontinence; denies bladder incontinence/retention.    PHYSICAL EXAM  GENERAL: Seen at bedside, NAD, well-groomed, well-developed, no signs of toxicity  NEURO: Alert & Oriented X3, Good concentration; sensory exam normal   HEENT: Head normocephalic; speech clear and fluid  RESP: Lungs Clear to auscultation bilaterally; no rales or rhonchi  CV: Regular rate and rhythm  GI: Abdomen Soft, Nontender, Nondistended; Bowel sounds present  EXTREMITIES: 2+ Peripheral Pulses, No cyanosis or edema; moves all extremities equally against gravity  MSK: Motor Strength 5/5 B/L upper and lower extremities except right plantar dorsiflexion 3/5; ROM intact; no paravertebral tenderness; no tenderness on spinal palpation; no muscle spasm  SKIN: No rashes or lesions  PSYCH: affect normal; good eye contact; no signs of depression or anxiety    PAST MEDICAL & SURGICAL HISTORY:  Pancreatitis    Hypertension    Hernia    Hypothyroid    History of back surgery        FAMILY HISTORY:  No pertinent family history in first degree relatives        Allergies    No Known Allergies        MEDICATIONS  (STANDING):  amLODIPine   Tablet 10 milliGRAM(s) Oral daily  enoxaparin Injectable 40 milliGRAM(s) SubCutaneous daily  influenza   Vaccine 0.5 milliLiter(s) IntraMuscular once  levothyroxine 150 MICROGram(s) Oral daily  pantoprazole    Tablet 40 milliGRAM(s) Oral before breakfast    MEDICATIONS  (PRN):  acetaminophen   Tablet .. 1000 milliGRAM(s) Oral every 6 hours PRN Mild Pain (1 - 3)  HYDROmorphone   Tablet 2 milliGRAM(s) Oral every 4 hours PRN Severe Pain (7 - 10)  HYDROmorphone   Tablet 4 milliGRAM(s) Oral every 4 hours PRN Severe Pain (7 - 10)  ibuprofen  Tablet. 600 milliGRAM(s) Oral every 6 hours PRN Moderate Pain (4 - 6)      Vital Signs:  T(C): 37.1 (09-21-20 @ 13:13)  HR: 103 (09-21-20 @ 13:13)  BP: 129/81 (09-21-20 @ 13:13)  RR: 18 (09-21-20 @ 13:13)  SpO2: 98% (09-21-20 @ 13:13)    Pertinent labs/radiology:  Reviewed                          10.0   4.12  )-----------( 382      ( 21 Sep 2020 07:11 )             31.5       09-21    137  |  105  |  7   ----------------------------<  77  3.9   |  23  |  0.80    Ca    9.4      21 Sep 2020 07:11  Phos  3.5     09-21  Mg     2.1     09-21    TPro  6.3  /  Alb  3.3  /  TBili  0.2  /  DBili  x   /  AST  13  /  ALT  8<L>  /  AlkPhos  78  09-21

## 2020-09-21 NOTE — PROGRESS NOTE ADULT - ASSESSMENT
53yo M with HTN, GERD, Hypothyroidism, PTSD, Alcoholic pancreatitis who presents with progressive abdominal pain found to have new fluid collection in the lesser sac and enlarging fluid collection above the spleen, likely 2/2 recurrent necrotizing pancreatitis.     Plan:   - plan for possible surgical intervention for ductal disruption as outpatient. No surgical intervention indicated during acute stage  - chronic pain service to be consulted for management of chronic pain  - regular diet  - GI recs no indication for EUS given acute fluid collections  - DVT ppx  - dc planning    BLUE [id number]

## 2020-09-21 NOTE — DIETITIAN INITIAL EVALUATION ADULT. - ADD RECOMMEND
1) Consider change diet to DASH diet. 2) Diet education provided, reinforce as needed. 3) RD to remain available and follow-up as medically appropriate.

## 2020-09-21 NOTE — DIETITIAN INITIAL EVALUATION ADULT. - PHYSICAL APPEARANCE
other (specify)/well nourished Ht: 71 inches, Wt: 168.7lbs, BMI: 23.6kg/m2, IBW: 172lbs +/- 10%, %IBW: 98%  Edema: none, Skin: free of pressure injuries per nursing flow sheets

## 2020-09-22 ENCOUNTER — TRANSCRIPTION ENCOUNTER (OUTPATIENT)
Age: 55
End: 2020-09-22

## 2020-09-22 VITALS
RESPIRATION RATE: 18 BRPM | OXYGEN SATURATION: 99 % | TEMPERATURE: 98 F | DIASTOLIC BLOOD PRESSURE: 81 MMHG | SYSTOLIC BLOOD PRESSURE: 128 MMHG | HEART RATE: 93 BPM

## 2020-09-22 LAB
ALBUMIN SERPL ELPH-MCNC: 3.3 G/DL — SIGNIFICANT CHANGE UP (ref 3.3–5)
ALP SERPL-CCNC: 81 U/L — SIGNIFICANT CHANGE UP (ref 40–120)
ALT FLD-CCNC: 6 U/L — LOW (ref 10–45)
ANION GAP SERPL CALC-SCNC: 9 MMOL/L — SIGNIFICANT CHANGE UP (ref 5–17)
AST SERPL-CCNC: 13 U/L — SIGNIFICANT CHANGE UP (ref 10–40)
BILIRUB SERPL-MCNC: 0.2 MG/DL — SIGNIFICANT CHANGE UP (ref 0.2–1.2)
BUN SERPL-MCNC: 7 MG/DL — SIGNIFICANT CHANGE UP (ref 7–23)
CALCIUM SERPL-MCNC: 9.3 MG/DL — SIGNIFICANT CHANGE UP (ref 8.4–10.5)
CHLORIDE SERPL-SCNC: 104 MMOL/L — SIGNIFICANT CHANGE UP (ref 96–108)
CO2 SERPL-SCNC: 24 MMOL/L — SIGNIFICANT CHANGE UP (ref 22–31)
CREAT SERPL-MCNC: 0.86 MG/DL — SIGNIFICANT CHANGE UP (ref 0.5–1.3)
GLUCOSE SERPL-MCNC: 93 MG/DL — SIGNIFICANT CHANGE UP (ref 70–99)
HCT VFR BLD CALC: 30.8 % — LOW (ref 39–50)
HGB BLD-MCNC: 10.1 G/DL — LOW (ref 13–17)
LIDOCAIN IGE QN: 50 U/L — SIGNIFICANT CHANGE UP (ref 7–60)
MAGNESIUM SERPL-MCNC: 2 MG/DL — SIGNIFICANT CHANGE UP (ref 1.6–2.6)
MCHC RBC-ENTMCNC: 30.7 PG — SIGNIFICANT CHANGE UP (ref 27–34)
MCHC RBC-ENTMCNC: 32.8 GM/DL — SIGNIFICANT CHANGE UP (ref 32–36)
MCV RBC AUTO: 93.6 FL — SIGNIFICANT CHANGE UP (ref 80–100)
NRBC # BLD: 0 /100 WBCS — SIGNIFICANT CHANGE UP (ref 0–0)
PHOSPHATE SERPL-MCNC: 3.7 MG/DL — SIGNIFICANT CHANGE UP (ref 2.5–4.5)
PLATELET # BLD AUTO: 398 K/UL — SIGNIFICANT CHANGE UP (ref 150–400)
POTASSIUM SERPL-MCNC: 4.5 MMOL/L — SIGNIFICANT CHANGE UP (ref 3.5–5.3)
POTASSIUM SERPL-SCNC: 4.5 MMOL/L — SIGNIFICANT CHANGE UP (ref 3.5–5.3)
PREALB SERPL-MCNC: 12 MG/DL — LOW (ref 20–40)
PROT SERPL-MCNC: 6.4 G/DL — SIGNIFICANT CHANGE UP (ref 6–8.3)
RBC # BLD: 3.29 M/UL — LOW (ref 4.2–5.8)
RBC # FLD: 15.9 % — HIGH (ref 10.3–14.5)
SODIUM SERPL-SCNC: 137 MMOL/L — SIGNIFICANT CHANGE UP (ref 135–145)
WBC # BLD: 4.16 K/UL — SIGNIFICANT CHANGE UP (ref 3.8–10.5)
WBC # FLD AUTO: 4.16 K/UL — SIGNIFICANT CHANGE UP (ref 3.8–10.5)

## 2020-09-22 PROCEDURE — 84443 ASSAY THYROID STIM HORMONE: CPT

## 2020-09-22 PROCEDURE — 85027 COMPLETE CBC AUTOMATED: CPT

## 2020-09-22 PROCEDURE — 83735 ASSAY OF MAGNESIUM: CPT

## 2020-09-22 PROCEDURE — 87086 URINE CULTURE/COLONY COUNT: CPT

## 2020-09-22 PROCEDURE — 85025 COMPLETE CBC W/AUTO DIFF WBC: CPT

## 2020-09-22 PROCEDURE — 84295 ASSAY OF SERUM SODIUM: CPT

## 2020-09-22 PROCEDURE — 96375 TX/PRO/DX INJ NEW DRUG ADDON: CPT

## 2020-09-22 PROCEDURE — 84100 ASSAY OF PHOSPHORUS: CPT

## 2020-09-22 PROCEDURE — 85014 HEMATOCRIT: CPT

## 2020-09-22 PROCEDURE — 82435 ASSAY OF BLOOD CHLORIDE: CPT

## 2020-09-22 PROCEDURE — 83605 ASSAY OF LACTIC ACID: CPT

## 2020-09-22 PROCEDURE — 96361 HYDRATE IV INFUSION ADD-ON: CPT

## 2020-09-22 PROCEDURE — 99285 EMERGENCY DEPT VISIT HI MDM: CPT | Mod: 25

## 2020-09-22 PROCEDURE — 82330 ASSAY OF CALCIUM: CPT

## 2020-09-22 PROCEDURE — 82150 ASSAY OF AMYLASE: CPT

## 2020-09-22 PROCEDURE — 84478 ASSAY OF TRIGLYCERIDES: CPT

## 2020-09-22 PROCEDURE — 96374 THER/PROPH/DIAG INJ IV PUSH: CPT | Mod: XU

## 2020-09-22 PROCEDURE — 74177 CT ABD & PELVIS W/CONTRAST: CPT

## 2020-09-22 PROCEDURE — 93005 ELECTROCARDIOGRAM TRACING: CPT

## 2020-09-22 PROCEDURE — 84134 ASSAY OF PREALBUMIN: CPT

## 2020-09-22 PROCEDURE — 80053 COMPREHEN METABOLIC PANEL: CPT

## 2020-09-22 PROCEDURE — 83690 ASSAY OF LIPASE: CPT

## 2020-09-22 PROCEDURE — 86900 BLOOD TYPING SEROLOGIC ABO: CPT

## 2020-09-22 PROCEDURE — 86850 RBC ANTIBODY SCREEN: CPT

## 2020-09-22 PROCEDURE — U0003: CPT

## 2020-09-22 PROCEDURE — 96376 TX/PRO/DX INJ SAME DRUG ADON: CPT

## 2020-09-22 PROCEDURE — 82947 ASSAY GLUCOSE BLOOD QUANT: CPT

## 2020-09-22 PROCEDURE — 82803 BLOOD GASES ANY COMBINATION: CPT

## 2020-09-22 PROCEDURE — 85018 HEMOGLOBIN: CPT

## 2020-09-22 PROCEDURE — A9585: CPT

## 2020-09-22 PROCEDURE — 85610 PROTHROMBIN TIME: CPT

## 2020-09-22 PROCEDURE — 74183 MRI ABD W/O CNTR FLWD CNTR: CPT

## 2020-09-22 PROCEDURE — 85730 THROMBOPLASTIN TIME PARTIAL: CPT

## 2020-09-22 PROCEDURE — 86901 BLOOD TYPING SEROLOGIC RH(D): CPT

## 2020-09-22 PROCEDURE — 84132 ASSAY OF SERUM POTASSIUM: CPT

## 2020-09-22 RX ORDER — OXYCODONE HYDROCHLORIDE 5 MG/1
1 TABLET ORAL
Qty: 42 | Refills: 0
Start: 2020-09-22 | End: 2020-09-28

## 2020-09-22 RX ORDER — OXYCODONE HYDROCHLORIDE 5 MG/1
1 TABLET ORAL
Qty: 30 | Refills: 0
Start: 2020-09-22 | End: 2020-09-26

## 2020-09-22 RX ORDER — IBUPROFEN 200 MG
1 TABLET ORAL
Qty: 0 | Refills: 0 | DISCHARGE
Start: 2020-09-22

## 2020-09-22 RX ORDER — ACETAMINOPHEN 500 MG
2 TABLET ORAL
Qty: 0 | Refills: 0 | DISCHARGE
Start: 2020-09-22

## 2020-09-22 RX ADMIN — OXYCODONE HYDROCHLORIDE 10 MILLIGRAM(S): 5 TABLET ORAL at 10:33

## 2020-09-22 RX ADMIN — OXYCODONE HYDROCHLORIDE 10 MILLIGRAM(S): 5 TABLET ORAL at 11:03

## 2020-09-22 RX ADMIN — Medication 150 MICROGRAM(S): at 06:31

## 2020-09-22 RX ADMIN — OXYCODONE HYDROCHLORIDE 10 MILLIGRAM(S): 5 TABLET ORAL at 07:31

## 2020-09-22 RX ADMIN — OXYCODONE HYDROCHLORIDE 10 MILLIGRAM(S): 5 TABLET ORAL at 02:02

## 2020-09-22 RX ADMIN — OXYCODONE HYDROCHLORIDE 10 MILLIGRAM(S): 5 TABLET ORAL at 15:08

## 2020-09-22 RX ADMIN — OXYCODONE HYDROCHLORIDE 10 MILLIGRAM(S): 5 TABLET ORAL at 02:45

## 2020-09-22 RX ADMIN — Medication 600 MILLIGRAM(S): at 09:17

## 2020-09-22 RX ADMIN — PANTOPRAZOLE SODIUM 40 MILLIGRAM(S): 20 TABLET, DELAYED RELEASE ORAL at 06:31

## 2020-09-22 RX ADMIN — OXYCODONE HYDROCHLORIDE 10 MILLIGRAM(S): 5 TABLET ORAL at 06:31

## 2020-09-22 RX ADMIN — AMLODIPINE BESYLATE 10 MILLIGRAM(S): 2.5 TABLET ORAL at 06:31

## 2020-09-22 RX ADMIN — ENOXAPARIN SODIUM 40 MILLIGRAM(S): 100 INJECTION SUBCUTANEOUS at 11:43

## 2020-09-22 RX ADMIN — Medication 600 MILLIGRAM(S): at 08:47

## 2020-09-22 RX ADMIN — OXYCODONE HYDROCHLORIDE 10 MILLIGRAM(S): 5 TABLET ORAL at 14:38

## 2020-09-22 NOTE — DISCHARGE NOTE NURSING/CASE MANAGEMENT/SOCIAL WORK - PATIENT PORTAL LINK FT
You can access the FollowMyHealth Patient Portal offered by [facility] by registering at the following website: [email or web address]. By joining [facility]’s FollowMyHealth portal, you will also be able to view your health information using other applications (apps) compatible with our system.

## 2020-09-22 NOTE — PROGRESS NOTE ADULT - SUBJECTIVE AND OBJECTIVE BOX
BLUE SURGERY DAILY PROGRESS NOTE:       SUBJECTIVE/ROS: Patient seen and examined. He c/o generalized abdominal pain and feels bloated. He denies N/V. He is passing flatus and having bms. He is tolerating regular diet.          MEDICATIONS  (STANDING):  amLODIPine   Tablet 10 milliGRAM(s) Oral daily  enoxaparin Injectable 40 milliGRAM(s) SubCutaneous daily  influenza   Vaccine 0.5 milliLiter(s) IntraMuscular once  levothyroxine 150 MICROGram(s) Oral daily  pantoprazole    Tablet 40 milliGRAM(s) Oral before breakfast    MEDICATIONS  (PRN):  acetaminophen   Tablet .. 1000 milliGRAM(s) Oral every 6 hours PRN Mild Pain (1 - 3)  HYDROmorphone  Injectable 1 milliGRAM(s) IV Push every 4 hours PRN Pain      OBJECTIVE:    Vital Signs Last 24 Hrs  T(C): 36.9 (21 Sep 2020 05:11), Max: 37.4 (20 Sep 2020 22:02)  T(F): 98.5 (21 Sep 2020 05:11), Max: 99.4 (20 Sep 2020 22:02)  HR: 70 (21 Sep 2020 05:11) (70 - 82)  BP: 111/74 (21 Sep 2020 05:11) (102/69 - 126/78)  BP(mean): --  RR: 18 (21 Sep 2020 05:11) (18 - 18)  SpO2: 96% (21 Sep 2020 05:11) (95% - 98%)        I&O's Detail    20 Sep 2020 07:01  -  21 Sep 2020 07:00  --------------------------------------------------------  IN:    Oral Fluid: 740 mL  Total IN: 740 mL    OUT:  Total OUT: 0 mL    Total NET: 740 mL          Daily     Daily     LABS:                        10.8   5.72  )-----------( 371      ( 20 Sep 2020 07:15 )             33.1     09-20    139  |  104  |  4<L>  ----------------------------<  92  4.5   |  24  |  0.75    Ca    9.6      20 Sep 2020 07:07  Phos  3.4     09-20  Mg     2.0     09-20    TPro  6.9  /  Alb  3.5  /  TBili  0.5  /  DBili  x   /  AST  12  /  ALT  7<L>  /  AlkPhos  88  09-20                  PHYSICAL EXAM:  Constitutional: Patient well nourished, well developed  Neuro: AAOx3  Respiratory: breathing comfortably  Gastrointestinal: Abdomen soft, non distended, nontender      
  24hr events/Overnight events:   - No acute events    SUBJECTIVE:  Patient reports continued pain. Tolerated reg diet but feels a pulling sensation. Patient requests pain medications upon discharge. He would like to stay another day and desires to have surgery as soon as possible.    OBJECTIVE:  Vital Signs Last 24 Hrs  T(C): 36.9 (21 Sep 2020 05:11), Max: 37.4 (20 Sep 2020 22:02)  T(F): 98.5 (21 Sep 2020 05:11), Max: 99.4 (20 Sep 2020 22:02)  HR: 70 (21 Sep 2020 05:11) (70 - 82)  BP: 111/74 (21 Sep 2020 05:11) (102/69 - 126/78)  RR: 18 (21 Sep 2020 05:11) (18 - 18)  SpO2: 96% (21 Sep 2020 05:11) (95% - 98%)    Physical Examination:  GEN: NAD  PULM: symmetric chest rise bilaterally, no increased WOB  ABD: exam deferred    LABS:                        10.8   5.72  )-----------( 371      ( 20 Sep 2020 07:15 )             33.1       09-20    139  |  104  |  4<L>  ----------------------------<  92  4.5   |  24  |  0.75    Ca    9.6      20 Sep 2020 07:07  Phos  3.4     09-20  Mg     2.0     09-20    TPro  6.9  /  Alb  3.5  /  TBili  0.5  /  DBili  x   /  AST  12  /  ALT  7<L>  /  AlkPhos  88  09-20      
BLUE SURGERY DAILY PROGRESS NOTE:       SUBJECTIVE/ROS: Patient seen and examined on morning rounds. Overall abdominal pain is controlled on new oral pain regimen. Pt denies N/V.  He admits to passing flatus and having BMs. He is tolerating regular diet.     MEDICATIONS  (STANDING):  amLODIPine   Tablet 10 milliGRAM(s) Oral daily  enoxaparin Injectable 40 milliGRAM(s) SubCutaneous daily  influenza   Vaccine 0.5 milliLiter(s) IntraMuscular once  levothyroxine 150 MICROGram(s) Oral daily  pantoprazole    Tablet 40 milliGRAM(s) Oral before breakfast    MEDICATIONS  (PRN):  acetaminophen   Tablet .. 1000 milliGRAM(s) Oral every 6 hours PRN Mild Pain (1 - 3)  ibuprofen  Tablet. 600 milliGRAM(s) Oral every 6 hours PRN Moderate Pain (4 - 6)  oxyCODONE    IR 10 milliGRAM(s) Oral every 4 hours PRN Severe Pain (7 - 10)      OBJECTIVE:    Vital Signs Last 24 Hrs  T(C): 36.6 (22 Sep 2020 04:49), Max: 37.1 (21 Sep 2020 13:13)  T(F): 97.9 (22 Sep 2020 04:49), Max: 98.7 (21 Sep 2020 13:13)  HR: 80 (22 Sep 2020 04:49) (73 - 103)  BP: 120/81 (22 Sep 2020 04:49) (107/70 - 129/81)  BP(mean): --  RR: 18 (22 Sep 2020 04:49) (18 - 18)  SpO2: 97% (22 Sep 2020 04:49) (96% - 98%)        I&O's Detail    21 Sep 2020 07:01  -  22 Sep 2020 07:00  --------------------------------------------------------  IN:    Oral Fluid: 360 mL  Total IN: 360 mL    OUT:  Total OUT: 0 mL    Total NET: 360 mL          Daily     Daily Weight in k.6 (21 Sep 2020 14:17)    LABS:                        10.1   4.16  )-----------( 398      ( 22 Sep 2020 06:07 )             30.8     09-    137  |  104  |  7   ----------------------------<  93  4.5   |  24  |  0.86    Ca    9.3      22 Sep 2020 06:06  Phos  3.7       Mg     2.0         TPro  6.4  /  Alb  3.3  /  TBili  0.2  /  DBili  x   /  AST  13  /  ALT  6<L>  /  AlkPhos  81                    PHYSICAL EXAM:  Constitutional: Patient well nourished, well developed  Neuro: AAOx3  Respiratory: breathing comfortably  Gastrointestinal: Abdomen soft, non distended, nontender        
Chief Complaint:  Patient is a 54y old  Male who presents with a chief complaint of Pancreatitis with fluid collection (16 Sep 2020 07:34)    Interval Events:   No acute overnight events  No fevers, chills, chest pain, shortness of breath, nausea, vomiting, diarrhea  Continues to report abdominal pain    Allergies:  No Known Allergies    Hospital Medications:  acetaminophen  IVPB .. 1000 milliGRAM(s) IV Intermittent every 6 hours PRN  amLODIPine   Tablet 10 milliGRAM(s) Oral daily  HYDROmorphone  Injectable 1 milliGRAM(s) IV Push every 4 hours PRN  influenza   Vaccine 0.5 milliLiter(s) IntraMuscular once  lactated ringers. 1000 milliLiter(s) IV Continuous <Continuous>  levothyroxine 150 MICROGram(s) Oral daily  pantoprazole    Tablet 40 milliGRAM(s) Oral before breakfast    PMHX/PSHX:  Pancreatitis  Hypertension  Hernia  Hypothyroid  History of back surgery  No significant past surgical history    ROS:   General:  No fevers, chills or night sweats  ENT:  No sore throat or dysphagia  CV:  No pain or palpitations  Resp:  No dyspnea, cough or  wheezing  GI:  + pain, No nausea, No vomiting, No diarrhea, No rectal bleeding, No tarry stools,  Skin:  No rash or edema    PHYSICAL EXAM:   Vital Signs:  Vital Signs Last 24 Hrs  T(C): 36.7 (16 Sep 2020 04:59), Max: 37.4 (15 Sep 2020 15:04)  T(F): 98.1 (16 Sep 2020 04:59), Max: 99.3 (15 Sep 2020 15:04)  HR: 79 (16 Sep 2020 04:59) (79 - 97)  BP: 120/77 (16 Sep 2020 04:59) (104/73 - 137/81)  BP(mean): 92 (15 Sep 2020 15:04) (92 - 92)  RR: 18 (16 Sep 2020 04:59) (16 - 18)  SpO2: 98% (16 Sep 2020 04:59) (96% - 100%)  Daily     Daily     GENERAL:  NAD, Appears stated age  HEENT:  NC/AT,  conjunctivae clear and pink, sclera -anicteric  CHEST:  Normal Effort, Breath sounds clear  HEART:  RRR, S1 + S2, no murmurs  ABDOMEN:  Soft, non-tender, non-distended, BS+  EXTEREMITIES:  no cyanosis  SKIN:  Warm & Dry.  NEURO:  Alert, oriented    LABS:                        10.9   8.24  )-----------( 292      ( 16 Sep 2020 06:31 )             34.0     Mean Cell Volume: 95.2 fl (09-16-20 @ 06:31)    09-16    135  |  98  |  15  ----------------------------<  110<H>  4.8   |  24  |  0.92    Ca    9.9      16 Sep 2020 06:31  Phos  4.0     09-16  Mg     2.0     09-16    TPro  6.8  /  Alb  3.6  /  TBili  1.2  /  DBili  x   /  AST  11  /  ALT  7<L>  /  AlkPhos  89  09-16    LIVER FUNCTIONS - ( 16 Sep 2020 06:31 )  Alb: 3.6 g/dL / Pro: 6.8 g/dL / ALK PHOS: 89 U/L / ALT: 7 U/L / AST: 11 U/L / GGT: x           PT/INR - ( 15 Sep 2020 08:24 )   PT: 15.0 sec;   INR: 1.28 ratio         PTT - ( 15 Sep 2020 08:24 )  PTT:32.6 sec    Amylase [id number]      Lipase [id number]       Ammonia--                          10.9   8.24  )-----------( 292      ( 16 Sep 2020 06:31 )             34.0                         13.4   11.58 )-----------( 408      ( 15 Sep 2020 08:24 )             41.1       Imaging:          
SUBJECTIVE:   Pt seen and examined at bedside. No acute events overnight. Pt laying in bed comfortably. Pt endorses crampy abdominal pain that is intermittent. Pt denies nausea, vomiting, chest pain, SOB, fever, chills. +flatus/+BM        Vital Signs Last 24 Hrs  T(C): 36.7 (19 Sep 2020 04:54), Max: 37.2 (18 Sep 2020 17:50)  T(F): 98 (19 Sep 2020 04:54), Max: 99 (18 Sep 2020 17:50)  HR: 79 (19 Sep 2020 04:54) (78 - 86)  BP: 114/72 (19 Sep 2020 04:54) (104/66 - 114/72)  BP(mean): --  RR: 18 (19 Sep 2020 04:54) (18 - 18)  SpO2: 93% (19 Sep 2020 04:54) (93% - 96%)      PHYSICAL EXAM:  Constitutional: Patient well nourished, well developed  Neuro: AAOx3  Respiratory: breathing comfortably  Gastrointestinal: Abdomen soft, non distended, nontender        I&O's Summary    18 Sep 2020 07:01  -  19 Sep 2020 07:00  --------------------------------------------------------  IN: 1120 mL / OUT: 1000 mL / NET: 120 mL      I&O's Detail    18 Sep 2020 07:01  -  19 Sep 2020 07:00  --------------------------------------------------------  IN:    dextrose 5% + sodium chloride 0.45% w/ Additives: 700 mL    Oral Fluid: 420 mL  Total IN: 1120 mL    OUT:    Voided (mL): 1000 mL  Total OUT: 1000 mL    Total NET: 120 mL          MEDICATIONS  (STANDING):  amLODIPine   Tablet 10 milliGRAM(s) Oral daily  enoxaparin Injectable 40 milliGRAM(s) SubCutaneous daily  influenza   Vaccine 0.5 milliLiter(s) IntraMuscular once  levothyroxine 150 MICROGram(s) Oral daily  pantoprazole    Tablet 40 milliGRAM(s) Oral before breakfast    MEDICATIONS  (PRN):  acetaminophen  IVPB .. 1000 milliGRAM(s) IV Intermittent every 6 hours PRN Mild Pain (1 - 3)  HYDROmorphone  Injectable 1 milliGRAM(s) IV Push every 4 hours PRN Pain      LABS:                        9.4    5.21  )-----------( 326      ( 19 Sep 2020 07:22 )             29.0     09-19    136  |  103  |  6<L>  ----------------------------<  110<H>  4.0   |  24  |  0.80    Ca    8.9      19 Sep 2020 07:22  Phos  3.6     09-19  Mg     2.0     09-19    TPro  5.9<L>  /  Alb  2.8<L>  /  TBili  0.4  /  DBili  x   /  AST  14  /  ALT  6<L>  /  AlkPhos  77  09-19          RADIOLOGY & ADDITIONAL STUDIES:
SURGERY PROGRESS NOTE    SUBJECTIVE / 24H EVENTS:  Patient seen and examined on morning rounds. No acute events overnight.  Pt laying in bed comfortably. Pt endorses crampy abdominal pain that is intermittent. Pt denies nausea, vomiting, chest pain, SOB, fever, chills. +flatus/+BM      OBJECTIVE:  VITAL SIGNS:  T(C): 36.9 (09-20-20 @ 04:34), Max: 37 (09-19-20 @ 13:20)  HR: 86 (09-20-20 @ 05:29) (74 - 92)  BP: 116/75 (09-20-20 @ 05:29) (104/67 - 129/84)  RR: 18 (09-20-20 @ 04:34) (18 - 18)  SpO2: 96% (09-20-20 @ 04:34) (94% - 96%)  Daily     Daily     PHYSICAL EXAM:  Constitutional: Patient well nourished, well developed  Neuro: AAOx3  Respiratory: breathing comfortably  Gastrointestinal: Abdomen soft, non distended, nontender    09-19-20 @ 07:01  -  09-20-20 @ 07:00  --------------------------------------------------------  IN:    IV PiggyBack: 200 mL    IV PiggyBack: 100 mL    Oral Fluid: 780 mL  Total IN: 1080 mL    OUT:  Total OUT: 0 mL    Total NET: 1080 mL          LAB VALUES:  09-20    139  |  104  |  4<L>  ----------------------------<  92  4.5   |  24  |  0.75    Ca    9.6      20 Sep 2020 07:07  Phos  3.4     09-20  Mg     2.0     09-20    TPro  6.9  /  Alb  3.5  /  TBili  0.5  /  DBili  x   /  AST  12  /  ALT  7<L>  /  AlkPhos  88  09-20                               10.8   5.72  )-----------( 371      ( 20 Sep 2020 07:15 )             33.1     LIVER FUNCTIONS - ( 20 Sep 2020 07:07 )  Alb: 3.5 g/dL / Pro: 6.9 g/dL / ALK PHOS: 88 U/L / ALT: 7 U/L / AST: 12 U/L / GGT: x               MICROBIOLOGY:      RADIOLOGY:        MEDICATIONS  (STANDING):  amLODIPine   Tablet 10 milliGRAM(s) Oral daily  enoxaparin Injectable 40 milliGRAM(s) SubCutaneous daily  influenza   Vaccine 0.5 milliLiter(s) IntraMuscular once  levothyroxine 150 MICROGram(s) Oral daily  pantoprazole    Tablet 40 milliGRAM(s) Oral before breakfast    MEDICATIONS  (PRN):  HYDROmorphone  Injectable 1 milliGRAM(s) IV Push every 4 hours PRN Pain           
SURGERY PROGRESS NOTE    SUBJECTIVE / 24H EVENTS:  Patient seen and examined on morning rounds. No acute events overnight.  Reports generalized epigastric/ periumbilical discomfort when moving in bed. Minimal belching. Denies nausea/ vomiting, chest pain, SOB. NPO since [facility] yesterday.      OBJECTIVE:  VITAL SIGNS:  T(C): 37.4 (09-17-20 @ 00:53), Max: 37.4 (09-16-20 @ 20:21)  HR: 80 (09-17-20 @ 00:53) (78 - 82)  BP: 117/70 (09-17-20 @ 00:53) (107/71 - 117/70)  RR: 18 (09-17-20 @ 00:53) (18 - 18)  SpO2: 95% (09-17-20 @ 00:53) (95% - 98%)  Daily     Daily         PHYSICAL EXAM:  General: well developed, well nourished, NAD  HEENT: NCAT, no lymphadenopathy  Respiratory: airway patent, respirations unlabored  CVS: regular rate and rhythm  Abdomen: soft, distended, LLQ tenderness  Skin: warm, dry, appropriate color    09-15-20 @ 07:01  -  09-16-20 @ 07:00  --------------------------------------------------------  IN:  Total IN: 0 mL    OUT:    Voided (mL): 375 mL  Total OUT: 375 mL    Total NET: -375 mL      09-16-20 @ 07:01  -  09-17-20 @ 06:01  --------------------------------------------------------  IN:  Total IN: 0 mL    OUT:    Oral Fluid: 0 mL    Voided (mL): 450 mL  Total OUT: 450 mL    Total NET: -450 mL          LAB VALUES:  09-16    135  |  98  |  15  ----------------------------<  110<H>  4.8   |  24  |  0.92    Ca    9.9      16 Sep 2020 06:31  Phos  4.0     09-16  Mg     2.0     09-16    TPro  6.8  /  Alb  3.6  /  TBili  1.2  /  DBili  x   /  AST  11  /  ALT  7<L>  /  AlkPhos  89  09-16                               10.9   8.24  )-----------( 292      ( 16 Sep 2020 06:31 )             34.0     LIVER FUNCTIONS - ( 16 Sep 2020 06:31 )  Alb: 3.6 g/dL / Pro: 6.8 g/dL / ALK PHOS: 89 U/L / ALT: 7 U/L / AST: 11 U/L / GGT: x           PT/INR - ( 15 Sep 2020 08:24 )   PT: 15.0 sec;   INR: 1.28 ratio         PTT - ( 15 Sep 2020 08:24 )  PTT:32.6 sec            MICROBIOLOGY:    Culture - Urine (collected 15 Sep 2020 16:48)  Source: .Urine Clean Catch (Midstream)  Final Report (16 Sep 2020 11:41):    No growth        RADIOLOGY:        MEDICATIONS  (STANDING):  amLODIPine   Tablet 10 milliGRAM(s) Oral daily  enoxaparin Injectable 40 milliGRAM(s) SubCutaneous daily  influenza   Vaccine 0.5 milliLiter(s) IntraMuscular once  lactated ringers. 1000 milliLiter(s) (150 mL/Hr) IV Continuous <Continuous>  levothyroxine 150 MICROGram(s) Oral daily  pantoprazole    Tablet 40 milliGRAM(s) Oral before breakfast    MEDICATIONS  (PRN):  acetaminophen  IVPB .. 1000 milliGRAM(s) IV Intermittent every 6 hours PRN Mild Pain (1 - 3)  HYDROmorphone  Injectable 1 milliGRAM(s) IV Push every 4 hours PRN Pain           
SURGERY PROGRESS NOTE    SUBJECTIVE / 24H EVENTS:  Patient seen and examined on morning rounds. No acute events overnight. MRCP completed yesterday, tolerating CLD, mod abd pain when moving, denies SOB, chest pain, dizziness. GI fx +,+      OBJECTIVE:  VITAL SIGNS:  T(C): 36.9 (09-18-20 @ 01:42), Max: 37.6 (09-17-20 @ 20:25)  HR: 80 (09-18-20 @ 01:42) (71 - 87)  BP: 116/74 (09-18-20 @ 01:42) (111/76 - 123/80)  RR: 18 (09-18-20 @ 01:42) (18 - 18)  SpO2: 95% (09-18-20 @ 01:42) (95% - 97%)  Daily     Daily       PHYSICAL EXAM:  General: well developed, well nourished, NAD  HEENT: NCAT, no lymphadenopathy  Respiratory: airway patent, respirations unlabored  CVS: regular rate and rhythm  Abdomen: soft, distended, LLQ tenderness  Skin: warm, dry, appropriate color      09-16-20 @ 07:01  -  09-17-20 @ 07:00  --------------------------------------------------------  IN:    Lactated Ringers: 1800 mL  Total IN: 1800 mL    OUT:    Oral Fluid: 0 mL    Voided (mL): 550 mL  Total OUT: 550 mL    Total NET: 1250 mL      09-17-20 @ 07:01  -  09-18-20 @ 05:31  --------------------------------------------------------  IN:    Oral Fluid: 580 mL  Total IN: 580 mL    OUT:    Voided (mL): 1975 mL  Total OUT: 1975 mL    Total NET: -1395 mL          LAB VALUES:  09-17    135  |  100  |  11  ----------------------------<  76  4.2   |  23  |  0.77    Ca    9.2      17 Sep 2020 07:04  Phos  3.2     09-17  Mg     1.9     09-17    TPro  6.1  /  Alb  3.0<L>  /  TBili  0.7  /  DBili  x   /  AST  11  /  ALT  6<L>  /  AlkPhos  72  09-17                               9.2    5.69  )-----------( 252      ( 17 Sep 2020 07:04 )             28.7     LIVER FUNCTIONS - ( 17 Sep 2020 07:04 )  Alb: 3.0 g/dL / Pro: 6.1 g/dL / ALK PHOS: 72 U/L / ALT: 6 U/L / AST: 11 U/L / GGT: x               MICROBIOLOGY:    Culture - Urine (collected 15 Sep 2020 16:48)  Source: .Urine Clean Catch (Midstream)  Final Report (16 Sep 2020 11:41):    No growth        RADIOLOGY:        MEDICATIONS  (STANDING):  amLODIPine   Tablet 10 milliGRAM(s) Oral daily  enoxaparin Injectable 40 milliGRAM(s) SubCutaneous daily  influenza   Vaccine 0.5 milliLiter(s) IntraMuscular once  lactated ringers. 1000 milliLiter(s) (150 mL/Hr) IV Continuous <Continuous>  levothyroxine 150 MICROGram(s) Oral daily  pantoprazole    Tablet 40 milliGRAM(s) Oral before breakfast    MEDICATIONS  (PRN):  acetaminophen  IVPB .. 1000 milliGRAM(s) IV Intermittent every 6 hours PRN Mild Pain (1 - 3)  HYDROmorphone  Injectable 1 milliGRAM(s) IV Push every 4 hours PRN Pain           
SURGERY PROGRESS NOTE    SUBJECTIVE / 24H EVENTS:  Patient seen and examined on morning rounds. No acute events overnight. Reports generalized epigastric/ periumbilical discomfort. Minimal belching. Denies nausea/ vomiting, chest pain, SOB.      OBJECTIVE:  VITAL SIGNS:  T(C): 36.7 (09-16-20 @ 04:59), Max: 37.4 (09-15-20 @ 15:04)  HR: 79 (09-16-20 @ 04:59) (79 - 97)  BP: 120/77 (09-16-20 @ 04:59) (104/73 - 137/81)  RR: 18 (09-16-20 @ 04:59) (16 - 18)  SpO2: 98% (09-16-20 @ 04:59) (96% - 100%)  Daily     Daily       PHYSICAL EXAM:  General: well developed, well nourished, NAD  HEENT: NCAT, no lymphadenopathy  Respiratory: airway patent, respirations unlabored  CVS: regular rate and rhythm  Abdomen: soft, distended, LLQ tenderness  Skin: warm, dry, appropriate color      09-15-20 @ 07:01  -  09-16-20 @ 07:00  --------------------------------------------------------  IN:  Total IN: 0 mL    OUT:    Voided (mL): 375 mL  Total OUT: 375 mL    Total NET: -375 mL          LAB VALUES:  09-16    135  |  98  |  15  ----------------------------<  110<H>  4.8   |  24  |  0.92    Ca    9.9      16 Sep 2020 06:31  Phos  4.0     09-16  Mg     2.0     09-16    TPro  6.8  /  Alb  3.6  /  TBili  1.2  /  DBili  x   /  AST  11  /  ALT  7<L>  /  AlkPhos  89  09-16                               10.9   8.24  )-----------( 292      ( 16 Sep 2020 06:31 )             34.0     LIVER FUNCTIONS - ( 16 Sep 2020 06:31 )  Alb: 3.6 g/dL / Pro: 6.8 g/dL / ALK PHOS: 89 U/L / ALT: 7 U/L / AST: 11 U/L / GGT: x           PT/INR - ( 15 Sep 2020 08:24 )   PT: 15.0 sec;   INR: 1.28 ratio         PTT - ( 15 Sep 2020 08:24 )  PTT:32.6 sec          MICROBIOLOGY:      RADIOLOGY:  PACS Image: Image(s) Available (09-15-20 @ 10:16)        MEDICATIONS  (STANDING):  amLODIPine   Tablet 10 milliGRAM(s) Oral daily  influenza   Vaccine 0.5 milliLiter(s) IntraMuscular once  lactated ringers. 1000 milliLiter(s) (100 mL/Hr) IV Continuous <Continuous>  levothyroxine 150 MICROGram(s) Oral daily  pantoprazole    Tablet 40 milliGRAM(s) Oral before breakfast    MEDICATIONS  (PRN):  acetaminophen  IVPB .. 1000 milliGRAM(s) IV Intermittent every 6 hours PRN Mild Pain (1 - 3)  HYDROmorphone  Injectable 1 milliGRAM(s) IV Push every 4 hours PRN Pain

## 2020-09-22 NOTE — PROGRESS NOTE ADULT - REASON FOR ADMISSION
Pancreatitis with fluid collection

## 2020-09-22 NOTE — PROGRESS NOTE ADULT - ASSESSMENT
53yo M with HTN, GERD, Hypothyroidism, PTSD, Alcoholic pancreatitis who presents with progressive abdominal pain found to have new fluid collection in the lesser sac and enlarging fluid collection above the spleen, likely 2/2 recurrent necrotizing pancreatitis.     Plan:   - plan for possible surgical intervention for ductal disruption as outpatient. No surgical intervention indicated during acute stage  - chronic pain service consult appreciated   - regular diet  - GI recs no indication for EUS given acute fluid collections  - DVT ppx  - dc planning today pending adequate pain control    BLUE [id number]

## 2020-09-30 ENCOUNTER — APPOINTMENT (OUTPATIENT)
Dept: PAIN MANAGEMENT | Facility: CLINIC | Age: 55
End: 2020-09-30
Payer: COMMERCIAL

## 2020-09-30 VITALS
DIASTOLIC BLOOD PRESSURE: 92 MMHG | BODY MASS INDEX: 23.8 KG/M2 | SYSTOLIC BLOOD PRESSURE: 130 MMHG | HEART RATE: 118 BPM | WEIGHT: 170 LBS | HEIGHT: 71 IN

## 2020-09-30 PROCEDURE — 99204 OFFICE O/P NEW MOD 45 MIN: CPT

## 2020-09-30 NOTE — PHYSICAL EXAM
[General Appearance - Alert] : alert [Oriented To Time, Place, And Person] : oriented to person, place, and time [Person] : oriented to person [Place] : oriented to place [Time] : oriented to time [Sclera] : the sclera and conjunctiva were normal [Neck Appearance] : the appearance of the neck was normal [Nail Clubbing] : no clubbing  or cyanosis of the fingernails [] : no rash [Both Tympanic Membranes Were Examined] : both tympanic membranes were normal [FreeTextEntry1] : severe abdominal tenderness to papation

## 2020-09-30 NOTE — ASSESSMENT
[Opioids] : Patient was explained in detail about pain control by using opioids. Patient has signed and fully understands our guidelines for medication and drug screening.  Patient understands the side effects of opioids, including, but not limited to, drug tolerance, dependence, potential for addiction. This class of drugs is habit-forming and CASEY regulated. The sedative effects of opioids can be potentiated by taking alcohol or any sleeping pills, along with opioids. The decision to drive is patient’s responsibility, as opioids can affect his/her driving ability and ability to concentrate. The long-term place is not clear, however, patient understands that once the pain control optimizes, the goal will be to wean off the opioids. All the issues regarding opioid treatment have been addressed satisfactorily.  [FreeTextEntry1] : Chronic abdominal pain\par UDT \par ISTOP reviewed\par Reviewed ER list of meds\par Since patient is in a lot of pain I will alter my policy and provide patient a 7 day supply   \par No signs of toxicity\par Consider local abdominal trigger point injections.

## 2020-09-30 NOTE — HISTORY OF PRESENT ILLNESS
[FreeTextEntry1] : 53 y/o RH male presents with chronic pain July 2020 and has been increasing over time. The pain is located in the abdominal region worsened with belching. Patient has an ulcer history, pancreatitis?\par The patient reports the pain feels like something coming up from the lower belly described as a muscle tightness. Workup to date reported to be negative. He was receiving oxycodone with Motrin. No back pain. He has been going to ER on separate occasions.

## 2020-10-06 ENCOUNTER — APPOINTMENT (OUTPATIENT)
Dept: SURGERY | Facility: CLINIC | Age: 55
End: 2020-10-06
Payer: COMMERCIAL

## 2020-10-06 VITALS
HEIGHT: 71 IN | TEMPERATURE: 97.1 F | BODY MASS INDEX: 24.5 KG/M2 | WEIGHT: 175 LBS | DIASTOLIC BLOOD PRESSURE: 84 MMHG | RESPIRATION RATE: 18 BRPM | HEART RATE: 94 BPM | OXYGEN SATURATION: 98 % | SYSTOLIC BLOOD PRESSURE: 139 MMHG

## 2020-10-06 DIAGNOSIS — K86.89 OTHER SPECIFIED DISEASES OF PANCREAS: ICD-10-CM

## 2020-10-06 PROCEDURE — 99214 OFFICE O/P EST MOD 30 MIN: CPT

## 2020-10-08 PROBLEM — K86.89 PANCREATIC DUCT DISRUPTION: Status: ACTIVE | Noted: 2020-10-07

## 2020-10-08 NOTE — PHYSICAL EXAM
[Normal] : oriented to person, place and time, with appropriate affect [de-identified] : Thin male, well developed, no acute distress  [de-identified] : Anicteric  [de-identified] : mask on [de-identified] : mask on [de-identified] : supple  [de-identified] : normal respiratory effort  [de-identified] : no deformities appreciated  [de-identified] : normal appearance

## 2020-10-08 NOTE — HISTORY OF PRESENT ILLNESS
[de-identified] : Mr. TREE NORWOOD is a 54 year old male who presents for follow-up visit s/p acute necrotizing pancreatitis. He has a history of acute pancreatitis in 4/2019, which was attributed to alcohol abuse. He states that he no longer drinks alcohol. He was admitted to [facility] from 8/5/20- 8/10/20 with abdominal pain and was found to have acute pancreatitis and peripancreatic fluid collections on CT 8/5/20. MRCP performed on 8/6/20 showed hemorrhagic/necrotizing pancreatitis; a 2.4 cm pseudocyst in the uncinate process and a 4.7 cm walled off necrosis at the pancreatic tail.  Post discharge he still had recurring abdominal pain and decreased PO intake. He was started on megace for appetite/ weight loss. I referred him for IR guided drainage of fluid collections, however per IR would not be amenable to drainage. I referred patient to advanced GI Dr. Kay for EUS performed on 8/25/20, findings noted-- a 3.6cm walled off pancreatic necrosis near the tail of the pancreas and the spleen, decreased in size compared to prior imaging. The WON was surrounded by splenic vein collateral blood vessels. Given the small size and blood vessels, cystgastrostomy was not performed.\par Patient was readmitted to [facility] on 9/15-9/22/20 with ongoing, progressive abdominal pain. CT and MRCP performed. He was noted to have new fluid collection in the lesser sac and enlarging fluid collection above the spleen, likely 2/2 recurrent necrotizing pancreatitis. MRCP 9/16/20 was concerning for pancreatic ductal disruption. \par \par Currently patient is taking PRN oxycodone as prescribed at his pain clinic. He has the same pain intermittently with some pain free days. He reports reflux symptoms, especially after acidic beverages like orange juice. He is taking protonix 1-2 times daily.  He is able to eat without vomiting. He reports needing surgery of his cervical spine that is planned this month--mid October and he does not want to postpone his spine surgery for pancreatic surgery.

## 2020-10-08 NOTE — REVIEW OF SYSTEMS
[Negative] : Heme/Lymph [FreeTextEntry8] : see HPI.  No diarrhea or constipation.  [de-identified] : cervical spine issue, awaits spine surgery

## 2020-10-08 NOTE — ASSESSMENT
[FreeTextEntry1] : s/p acute necrotizing pancreatitis and peripancreatic fluid collections/WON. Suspect gall bladder as the etiology. Also now has disrupted pancreatic duct which can cause recurrent pancreatitis and fluid collections. He is going for C-spine surgery this month and does not want to postpone. \par \par Plan:\par -Surgical intervention with pancreaticojejunostomy body/tail of pancreas and open cholecystectomy. Pt requests beginning of November for OR date. \par -Repeat MRI/MRCP end of this month\par -RTC after MRI for further pre-operative discussion

## 2020-10-27 ENCOUNTER — APPOINTMENT (OUTPATIENT)
Dept: SURGERY | Facility: CLINIC | Age: 55
End: 2020-10-27

## 2020-10-30 ENCOUNTER — OUTPATIENT (OUTPATIENT)
Dept: OUTPATIENT SERVICES | Facility: HOSPITAL | Age: 55
LOS: 1 days | End: 2020-10-30
Payer: COMMERCIAL

## 2020-10-30 ENCOUNTER — APPOINTMENT (OUTPATIENT)
Dept: MRI IMAGING | Facility: IMAGING CENTER | Age: 55
End: 2020-10-30
Payer: COMMERCIAL

## 2020-10-30 DIAGNOSIS — Z00.8 ENCOUNTER FOR OTHER GENERAL EXAMINATION: ICD-10-CM

## 2020-10-30 DIAGNOSIS — Z98.890 OTHER SPECIFIED POSTPROCEDURAL STATES: Chronic | ICD-10-CM

## 2020-10-30 DIAGNOSIS — K86.89 OTHER SPECIFIED DISEASES OF PANCREAS: ICD-10-CM

## 2020-10-30 PROCEDURE — A9585: CPT

## 2020-10-30 PROCEDURE — 74183 MRI ABD W/O CNTR FLWD CNTR: CPT

## 2020-10-30 PROCEDURE — 74183 MRI ABD W/O CNTR FLWD CNTR: CPT | Mod: 26

## 2020-11-04 ENCOUNTER — APPOINTMENT (OUTPATIENT)
Dept: SURGERY | Facility: HOSPITAL | Age: 55
End: 2020-11-04

## 2020-11-05 ENCOUNTER — APPOINTMENT (OUTPATIENT)
Dept: PAIN MANAGEMENT | Facility: CLINIC | Age: 55
End: 2020-11-05

## 2020-11-16 ENCOUNTER — APPOINTMENT (OUTPATIENT)
Dept: SURGERY | Facility: HOSPITAL | Age: 55
End: 2020-11-16

## 2020-11-24 ENCOUNTER — APPOINTMENT (OUTPATIENT)
Dept: SURGERY | Facility: CLINIC | Age: 55
End: 2020-11-24

## 2020-11-30 ENCOUNTER — APPOINTMENT (OUTPATIENT)
Dept: SURGERY | Facility: HOSPITAL | Age: 55
End: 2020-11-30

## 2020-12-08 ENCOUNTER — APPOINTMENT (OUTPATIENT)
Dept: GASTROENTEROLOGY | Facility: CLINIC | Age: 55
End: 2020-12-08

## 2020-12-10 NOTE — ED PROVIDER NOTE - ENMT NEGATIVE STATEMENT, MLM
Left detailed voice message for pt. She stated she had trapped blood from sclerotherapy.  Pt was seen 11/24/2021 (16days ago). In message to pt, this is part of the process and the earliest they would consider removing the trapped blood is after 1 month. Pt has 6 week follow up 1/5/2021, Offered appt end of December and pt to call back if she wants to reschedule appt or has more questions. Did refer pt to view discharge instructions sent with her after 11/24/20 sclerotherapy appt.   Derrick Iniguez RN     Ears: no ear pain and no hearing problems.Nose: no nasal congestion and no nasal drainage.Mouth/Throat: no dysphagia, no hoarseness and no throat pain.Neck: no lumps, no pain, no stiffness and no swollen glands.

## 2020-12-15 ENCOUNTER — APPOINTMENT (OUTPATIENT)
Dept: SURGERY | Facility: CLINIC | Age: 55
End: 2020-12-15

## 2021-01-14 ENCOUNTER — APPOINTMENT (OUTPATIENT)
Dept: PAIN MANAGEMENT | Facility: CLINIC | Age: 56
End: 2021-01-14
Payer: COMMERCIAL

## 2021-01-14 VITALS
WEIGHT: 165 LBS | SYSTOLIC BLOOD PRESSURE: 114 MMHG | HEART RATE: 87 BPM | BODY MASS INDEX: 23.1 KG/M2 | DIASTOLIC BLOOD PRESSURE: 75 MMHG | HEIGHT: 71 IN

## 2021-01-14 PROCEDURE — 99213 OFFICE O/P EST LOW 20 MIN: CPT

## 2021-01-14 PROCEDURE — 99072 ADDL SUPL MATRL&STAF TM PHE: CPT

## 2021-01-14 NOTE — HISTORY OF PRESENT ILLNESS
[FreeTextEntry1] : Pt returns today for a follow up visit. He continues to have abdomonal pain , neck pain and lower back pain. \par He is s/p a lumbar fusion on 12/8/2020 - which explains on ISTOP another prescriber for Vicodin for 5 day supply. \par Pt made aware to let us know if he will be having another provider prescribe pain meds after surgery . \par He may require another lower back surgery . \par \par Pt explains he has not had any pain medication in 3 weeks. \par \par No signs of toxicity noted. \par Pt reminded to avoid alcohol and keep meds in a secure location \par ISTOP # [id number].

## 2021-01-14 NOTE — PHYSICAL EXAM
[General Appearance - Alert] : alert [General Appearance - Well-Appearing] : healthy appearing [Oriented To Time, Place, And Person] : oriented to person, place, and time [Affect] : the affect was normal [Mood] : the mood was normal [Paresis Pronator Drift Right-Sided] : no pronator drift on the right [Paresis Pronator Drift Left-Sided] : no pronator drift on the left [Motor Strength Upper Extremities Bilaterally] : strength was normal in both upper extremities [FreeTextEntry8] : walks with a cane  [Sclera] : the sclera and conjunctiva were normal [] : no respiratory distress

## 2021-01-14 NOTE — REVIEW OF SYSTEMS
[Fever] : no fever [Chills] : no chills [Chest Pain] : no chest pain [Palpitations] : no palpitations [Shortness Of Breath] : no shortness of breath [Neck Pain] : neck pain [Back Pain] : ~T back pain

## 2021-01-14 NOTE — ASSESSMENT
[FreeTextEntry1] : I will provide a 2 week supply on Percocet 7.5/ 325mg TID until appt with Dr oshea on 1/25/21. \par  [Opioids] : Patient was explained in detail about pain control by using opioids. Patient has signed and fully understands our guidelines for medication and drug screening.  Patient understands the side effects of opioids, including, but not limited to, drug tolerance, dependence, potential for addiction. This class of drugs is habit-forming and CASEY regulated. The sedative effects of opioids can be potentiated by taking alcohol or any sleeping pills, along with opioids. The decision to drive is patient’s responsibility, as opioids can affect his/her driving ability and ability to concentrate. The long-term place is not clear, however, patient understands that once the pain control optimizes, the goal will be to wean off the opioids. All the issues regarding opioid treatment have been addressed satisfactorily.

## 2021-01-25 ENCOUNTER — APPOINTMENT (OUTPATIENT)
Dept: PAIN MANAGEMENT | Facility: CLINIC | Age: 56
End: 2021-01-25
Payer: COMMERCIAL

## 2021-01-25 VITALS
HEART RATE: 91 BPM | HEIGHT: 71 IN | DIASTOLIC BLOOD PRESSURE: 84 MMHG | BODY MASS INDEX: 23.52 KG/M2 | WEIGHT: 168 LBS | SYSTOLIC BLOOD PRESSURE: 130 MMHG

## 2021-01-25 VITALS — TEMPERATURE: 97.3 F

## 2021-01-25 PROCEDURE — 99072 ADDL SUPL MATRL&STAF TM PHE: CPT

## 2021-01-25 PROCEDURE — 99212 OFFICE O/P EST SF 10 MIN: CPT

## 2021-01-27 NOTE — HISTORY OF PRESENT ILLNESS
[FreeTextEntry1] : Pt is here for a follow up visit. Pt continues to have abdominal pain that s/t shoots up into the Lt-shoulder. No low back pain. Pt needs a renewal for his meds. +N/T in hi stoes. Low back sx Dec 8th. Has to follow up for having gallbladder sx.  \par Upset this morning, states his aunt past away this morning. Meds are in a safe palce, no cardiac issues, recent EKG-WNL. No alcohol use. \par

## 2021-01-27 NOTE — PHYSICAL EXAM
[General Appearance - Alert] : alert [General Appearance - In No Acute Distress] : in no acute distress [General Appearance - Well Nourished] : well nourished [General Appearance - Well Developed] : well developed [Oriented To Time, Place, And Person] : oriented to person, place, and time [Impaired Insight] : insight and judgment were intact [Affect] : the affect was normal [Mood] : the mood was normal [Person] : oriented to person [Place] : oriented to place [Time] : oriented to time [Sclera] : the sclera and conjunctiva were normal [Outer Ear] : the ears and nose were normal in appearance [Neck Appearance] : the appearance of the neck was normal [No Spinal Tenderness] : no spinal tenderness [Musculoskeletal - Swelling] : no joint swelling seen [] : no rash [FreeTextEntry1] : No signs of toxicity.  [FreeTextEntry8] : Antalgic gait using a cane.

## 2021-02-17 NOTE — ED ADULT NURSE NOTE - CAS EDN DISCHARGE INTERVENTIONS
COMPLETE PHYSICAL EXAM note    History  Chi Bone is a 54 year old male who presents routine physical. Denies any dizziness, headaches or lightheadedness, chest pain, shortness of breath palpitations, no abdominal pain, nausea vomiting, denies any diarrhea constipation. No dysuria, hematuria, nocturia, no dribbling, hesitancy or retention.    History of anxiety, currently taking BuSpar.     medical history  Past Medical History:   Diagnosis Date   • High cholesterol        SURGICAL history  Past Surgical History:   Procedure Laterality Date   • Hand surgery     • Knee surgery         aLLERGIES  ALLERGIES:  No Known Allergies    mEDICATIONS  Current Outpatient Medications   Medication Sig   • busPIRone (BUSPAR) 7.5 MG tablet Take 1 tablet by mouth 2 times daily.   • atorvastatin (LIPITOR) 20 MG tablet Take 1 tablet by mouth daily.   • aspirin 81 MG EC tablet Take 1 tablet by mouth daily.   • meloxicam (MOBIC) 15 MG tablet Take 1 tablet by mouth daily.   • Omega-3 Fatty Acids (FISH OIL PO) Take by mouth daily.   • sildenafil (VIAGRA) 50 MG tablet Take 1 tablet by mouth as needed for Erectile Dysfunction. Start with 25 mg daily as needed.   • Multiple Vitamins-Minerals (MULTIVITAMIN ADULT PO) Take 1 tablet by mouth daily.     No current facility-administered medications for this visit.        social history  Social History     Tobacco Use   • Smoking status: Light Tobacco Smoker   • Smokeless tobacco: Never Used   Substance Use Topics   • Alcohol use: No   • Drug use: No       family history  Family History   Problem Relation Age of Onset   • Heart disease Mother    • High blood pressure Mother    • Heart disease Father    • High blood pressure Father    • Cancer Brother    • Diabetes Brother    • Stroke Sister    • Arthritis Neg Hx    • Asthma Neg Hx    • Depression Neg Hx        Review of systems  Constitutional:  Patient denies fever, chills, tiredness or malaise.  Eyes:  Denies change in visual acuity, pain,  burning or itching.  Immunologic:  Denies hives, seasonal allergies.  HENT:  Denies sinus problems, ear infections, nasal congestion or sore throat.  Respiratory:  Denies cough, shortness of breath.  Cardiovascular:  Denies chest pain, edema.  Gastrointestinal:  Denies abdominal pain, nausea, vomiting, bloody stools or diarrhea.  Genitourinary:  Denies urine retention, painful urination, urinary frequency, blood in urine or nocturia.  Musculoskeletal:  Denies back pain, neck pain, joint pain or leg swelling.  Integument:  Denies rash, itching.  Neurologic:  Denies headache, focal weakness or sensory changes.  Endocrine:  Denies polyuria, polydipsia or temperature intolerance.  Lymphatic:  Denies swollen glands, weight loss.  All other systems reviewed and negative.    Physical Exam  Vital Signs:    Vitals:    02/17/21 1542   BP: 124/70   Pulse: 77   SpO2: 96%   Weight: 93.1 kg   Height: 5' 9\" (1.753 m)     Constitutional:  Well-developed, well-nourished. no acute distress.  Integument:  Warm. Dry. No erythema. No rash.  HENT:  Normocephalic. Atraumatic. Bilateral external ears normal. Oropharynx moist. No oral exudates. Nose normal.  Neck:  Supple. No cervical lymphadenopathy. no thyromegaly. No carotid bruits.  Eyes:  PERRL (pupils equal, round and reactive to light), EOMI (extraocular movements intact). Conjunctivae normal. No discharge.  Cardiovascular:  Normal heart rate. Normal rhythm. No murmurs. No rubs. No gallops. 2+ radial pulses. 2+ dorsalis pedis and posterior tibial pulses .  Respiratory:  Normal breath sounds. No respiratory distress. No wheezing, rales or rhonchi.  Gastrointestinal:  Bowel sounds normal. Soft. No tenderness. No distention. No masses. No hepatosplenomegaly.  Neurologic:  Alert and oriented x 3. Normal motor function. Normal sensory function. DTRs (deep tendon reflexes) normal, cranial nerves II through XII grossly intact.  Psychologic: Normal mood and normal behavior.    Assessment and  Plan  1) routine physical exam- exam baseline, will check routine labs.     2) anxiety- mildly worsened, patient does feel above prescription of 7.5 mg tablets.  At this time, he is going to take 1 and half tablets b.i.d. equating to 11.25 mg b.i.d. however, once patient is out of medication, will send a prescription for 10 mg b.i.d..    Patient verbalized understanding agreed to plan, return to clinic 6 months or sooner p.r.n.     IV intact/Arm band on

## 2021-03-02 ENCOUNTER — APPOINTMENT (OUTPATIENT)
Dept: PAIN MANAGEMENT | Facility: CLINIC | Age: 56
End: 2021-03-02
Payer: COMMERCIAL

## 2021-03-02 VITALS — HEART RATE: 89 BPM | DIASTOLIC BLOOD PRESSURE: 85 MMHG | SYSTOLIC BLOOD PRESSURE: 147 MMHG

## 2021-03-02 VITALS
HEART RATE: 93 BPM | BODY MASS INDEX: 24.08 KG/M2 | HEIGHT: 71 IN | WEIGHT: 172 LBS | DIASTOLIC BLOOD PRESSURE: 97 MMHG | SYSTOLIC BLOOD PRESSURE: 164 MMHG

## 2021-03-02 PROCEDURE — 99214 OFFICE O/P EST MOD 30 MIN: CPT

## 2021-03-02 PROCEDURE — 99072 ADDL SUPL MATRL&STAF TM PHE: CPT

## 2021-03-02 NOTE — ASSESSMENT
[FreeTextEntry1] : Chronic pain syndrome\par Back pain\par Left shoulder pain\par \par Will rec adding Elavil 10 mgs qhs. No cardiac issues. AE s l provided.\par \par UDT

## 2021-03-02 NOTE — HISTORY OF PRESENT ILLNESS
[FreeTextEntry1] : Patient reports persistent left shoulder pain extending to the left neck region. He notes pain in left abdomen area extends into the shoulder. The "pancreas" doc is getting a repeat MRI for fu. His spine surgeon is also considering further lumbar spine surgery.\par \par Recently, his appetite has improved. His pain has been relatively controlled during this past weekend. Yesterday, the pain increased ? secondary to increase in activity. \par Sleep is impaired throughout the nite. \par Taking oxycodone 3 per day\par \par ADLS +, Does not drive

## 2021-03-02 NOTE — PHYSICAL EXAM
[General Appearance - Alert] : alert [Affect] : the affect was normal [Person] : oriented to person [Place] : oriented to place [Time] : oriented to time [Sclera] : the sclera and conjunctiva were normal [Outer Ear] : the ears and nose were normal in appearance [Neck Appearance] : the appearance of the neck was normal [Nail Clubbing] : no clubbing  or cyanosis of the fingernails [] : no rash

## 2021-03-09 ENCOUNTER — INPATIENT (INPATIENT)
Facility: HOSPITAL | Age: 56
LOS: 2 days | Discharge: HOME | DRG: 287 | End: 2021-03-12
Attending: INTERNAL MEDICINE | Admitting: INTERNAL MEDICINE
Payer: COMMERCIAL

## 2021-03-09 VITALS
HEART RATE: 99 BPM | RESPIRATION RATE: 20 BRPM | TEMPERATURE: 99 F | DIASTOLIC BLOOD PRESSURE: 76 MMHG | HEIGHT: 71 IN | SYSTOLIC BLOOD PRESSURE: 116 MMHG | WEIGHT: 164.91 LBS | OXYGEN SATURATION: 98 %

## 2021-03-09 DIAGNOSIS — Z98.890 OTHER SPECIFIED POSTPROCEDURAL STATES: Chronic | ICD-10-CM

## 2021-03-09 LAB
ALBUMIN SERPL ELPH-MCNC: 4 G/DL — SIGNIFICANT CHANGE UP (ref 3.3–5)
ALP SERPL-CCNC: 88 U/L — SIGNIFICANT CHANGE UP (ref 40–120)
ALT FLD-CCNC: 11 U/L — SIGNIFICANT CHANGE UP (ref 10–45)
ANION GAP SERPL CALC-SCNC: 13 MMOL/L — SIGNIFICANT CHANGE UP (ref 5–17)
APTT BLD: 23 SEC — LOW (ref 27.5–35.5)
AST SERPL-CCNC: 25 U/L — SIGNIFICANT CHANGE UP (ref 10–40)
BASOPHILS # BLD AUTO: 0.06 K/UL — SIGNIFICANT CHANGE UP (ref 0–0.2)
BASOPHILS NFR BLD AUTO: 0.8 % — SIGNIFICANT CHANGE UP (ref 0–2)
BILIRUB SERPL-MCNC: 0.1 MG/DL — LOW (ref 0.2–1.2)
BUN SERPL-MCNC: 13 MG/DL — SIGNIFICANT CHANGE UP (ref 7–23)
CALCIUM SERPL-MCNC: 9.6 MG/DL — SIGNIFICANT CHANGE UP (ref 8.4–10.5)
CHLORIDE SERPL-SCNC: 102 MMOL/L — SIGNIFICANT CHANGE UP (ref 96–108)
CO2 SERPL-SCNC: 22 MMOL/L — SIGNIFICANT CHANGE UP (ref 22–31)
CREAT SERPL-MCNC: 0.89 MG/DL — SIGNIFICANT CHANGE UP (ref 0.5–1.3)
EOSINOPHIL # BLD AUTO: 0.38 K/UL — SIGNIFICANT CHANGE UP (ref 0–0.5)
EOSINOPHIL NFR BLD AUTO: 4.8 % — SIGNIFICANT CHANGE UP (ref 0–6)
GLUCOSE SERPL-MCNC: 100 MG/DL — HIGH (ref 70–99)
HCT VFR BLD CALC: 36.2 % — LOW (ref 39–50)
HGB BLD-MCNC: 11.7 G/DL — LOW (ref 13–17)
IMM GRANULOCYTES NFR BLD AUTO: 0.3 % — SIGNIFICANT CHANGE UP (ref 0–1.5)
INR BLD: 0.92 RATIO — SIGNIFICANT CHANGE UP (ref 0.88–1.16)
LYMPHOCYTES # BLD AUTO: 3.13 K/UL — SIGNIFICANT CHANGE UP (ref 1–3.3)
LYMPHOCYTES # BLD AUTO: 39.4 % — SIGNIFICANT CHANGE UP (ref 13–44)
MAGNESIUM SERPL-MCNC: 2 MG/DL — SIGNIFICANT CHANGE UP (ref 1.6–2.6)
MCHC RBC-ENTMCNC: 29.9 PG — SIGNIFICANT CHANGE UP (ref 27–34)
MCHC RBC-ENTMCNC: 32.3 GM/DL — SIGNIFICANT CHANGE UP (ref 32–36)
MCV RBC AUTO: 92.6 FL — SIGNIFICANT CHANGE UP (ref 80–100)
MONOCYTES # BLD AUTO: 0.47 K/UL — SIGNIFICANT CHANGE UP (ref 0–0.9)
MONOCYTES NFR BLD AUTO: 5.9 % — SIGNIFICANT CHANGE UP (ref 2–14)
NEUTROPHILS # BLD AUTO: 3.89 K/UL — SIGNIFICANT CHANGE UP (ref 1.8–7.4)
NEUTROPHILS NFR BLD AUTO: 48.8 % — SIGNIFICANT CHANGE UP (ref 43–77)
NRBC # BLD: 0 /100 WBCS — SIGNIFICANT CHANGE UP (ref 0–0)
NT-PROBNP SERPL-SCNC: 11 PG/ML — SIGNIFICANT CHANGE UP (ref 0–300)
PHOSPHATE SERPL-MCNC: 3.6 MG/DL — SIGNIFICANT CHANGE UP (ref 2.5–4.5)
PLATELET # BLD AUTO: 282 K/UL — SIGNIFICANT CHANGE UP (ref 150–400)
POTASSIUM SERPL-MCNC: 4.6 MMOL/L — SIGNIFICANT CHANGE UP (ref 3.5–5.3)
POTASSIUM SERPL-SCNC: 4.6 MMOL/L — SIGNIFICANT CHANGE UP (ref 3.5–5.3)
PROT SERPL-MCNC: 7.9 G/DL — SIGNIFICANT CHANGE UP (ref 6–8.3)
PROTHROM AB SERPL-ACNC: 11.1 SEC — SIGNIFICANT CHANGE UP (ref 10.6–13.6)
RBC # BLD: 3.91 M/UL — LOW (ref 4.2–5.8)
RBC # FLD: 18.5 % — HIGH (ref 10.3–14.5)
SODIUM SERPL-SCNC: 137 MMOL/L — SIGNIFICANT CHANGE UP (ref 135–145)
TROPONIN T, HIGH SENSITIVITY RESULT: 14 NG/L — SIGNIFICANT CHANGE UP (ref 0–51)
WBC # BLD: 7.95 K/UL — SIGNIFICANT CHANGE UP (ref 3.8–10.5)
WBC # FLD AUTO: 7.95 K/UL — SIGNIFICANT CHANGE UP (ref 3.8–10.5)

## 2021-03-09 NOTE — ED PROVIDER NOTE - CARE PLAN
Principal Discharge DX:	Exertional chest pain  Secondary Diagnosis:	Numbness and tingling of left upper and lower extremity

## 2021-03-09 NOTE — ED PROVIDER NOTE - NS ED ROS FT
CONSTITUTIONAL: No fevers, no chills, no lightheadedness, no dizziness  EYES: no visual changes, no eye pain  EARS: no ear drainage, no ear pain, no change in hearing  NOSE: no nasal congestion  MOUTH/THROAT: no sore throat  CV: no palpitations  RESP: no cough  GI: No n/v/d, no abd pain  : no dysuria, no hematuria, no flank pain  MSK: no back pain, no extremity pain  SKIN: no rashes  NEURO: no headache, no focal weakness

## 2021-03-09 NOTE — ED ADULT TRIAGE NOTE - NS ED TRIAGE AVPU SCALE
Unresponsive - The patient is nonverbal and does not respond even when a painful stimulus is applied. Alert-The patient is alert, awake and responds to voice. The patient is oriented to time, place, and person. The triage nurse is able to obtain subjective information.
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
## 2021-03-09 NOTE — ED PROVIDER NOTE - PHYSICAL EXAMINATION
Gen: Well appearing, NAD  Head: NCAT  HEENT: PERRL, MMM, normal conjunctiva, anicteric, neck supple  Lung: CTAB, no adventitious sounds  CV: RRR, no murmurs, 2+ peripheral pulses in all extremities  Abd: soft, NTND, no rebound or guarding, no CVAT  MSK: No edema, no visible deformities  Neuro: CN II-XII grossly intact. 5/5 strength and normal sensation in all extremities  Skin: Warm and dry, no evidence of rash

## 2021-03-09 NOTE — ED PROVIDER NOTE - RAPID ASSESSMENT
55 M with PMHx of lower spinal surgery in december 2020 presents with left sided numbness and pain since Sunday. Reports Sunday morning waking up to "a dead arm." States he had to shake it in order to gain sensation. States has been endorsing numbness and pain from left side neck radiating down his left lower extremities. Notes SOB when walking. +weakness.     Scribe Statement: Jennifer MOSER Tiffany, attest that this documentation has been prepared under the direction and in the presence of Jenn Harry (PA) 55 M with PMHx of lower spinal surgery in december 2020 presents with left sided numbness and pain since Sunday. Reports Sunday morning waking up to "a dead arm." States he had to shake it in order to gain sensation. States has been endorsing numbness and pain from left side neck radiating down his left lower extremities. Notes SOB when walking. +weakness.     Scribe Statement: I, Eugenia Rodriguez, attest that this documentation has been prepared under the direction and in the presence of Jenn Harry (PA)    Rapid assessment by Jenn Harry PA-C full eval to be performed in ED. Above documentation completed by scribe above. I was present for and agree with documentation.   Jenn Harry PA-C

## 2021-03-09 NOTE — ED ADULT TRIAGE NOTE - CHIEF COMPLAINT QUOTE
left sided "dead arm" on Sunday resolved, now c/o shortness of breath with exertion and numbness to left arm. also with pain to left side of neck down to foot.

## 2021-03-09 NOTE — ED PROVIDER NOTE - CLINICAL SUMMARY MEDICAL DECISION MAKING FREE TEXT BOX
Constitution of exertional dyspnea, subjective neuro sx (dec sensation) and trap pain concern for poss dissection but no sign of malperfusion. Labs, CTA Constitution of exertional dyspnea, subjective neuro sx (dec sensation) and trap pain concern for poss dissection but no sign of malperfusion. Labs, CTA    Ashley Camejo MD - Attending Physician: Pt here L sided numbness x 2 days, associated exertional chest pain and sob, new. Exam benign and nonfocal, but given complaints CTA for dissection. Admit for further work-up if neg

## 2021-03-09 NOTE — ED PROVIDER NOTE - PROGRESS NOTE DETAILS
Gabo Vela DO PGY3 - pt asymptomatic. Will require cards garibay. Gabo Vela DO PGY3 - pt asymptomatic and has no further paresthesias/dec sensation. Will require cards garibay given concerning chest pain and dyspnea Gabo Vela DO PGY3 - pt w/ L lower rib pain. May be unable to stress of still having pain per CDU. Will pain control and reassess if amneable for CDU v admission

## 2021-03-10 DIAGNOSIS — R07.9 CHEST PAIN, UNSPECIFIED: ICD-10-CM

## 2021-03-10 LAB
SARS-COV-2 RNA SPEC QL NAA+PROBE: SIGNIFICANT CHANGE UP
TROPONIN T, HIGH SENSITIVITY RESULT: 12 NG/L — SIGNIFICANT CHANGE UP (ref 0–51)

## 2021-03-10 PROCEDURE — 74174 CTA ABD&PLVS W/CONTRAST: CPT | Mod: 26,MA

## 2021-03-10 PROCEDURE — 93018 CV STRESS TEST I&R ONLY: CPT

## 2021-03-10 PROCEDURE — 93016 CV STRESS TEST SUPVJ ONLY: CPT

## 2021-03-10 PROCEDURE — 78452 HT MUSCLE IMAGE SPECT MULT: CPT | Mod: 26

## 2021-03-10 PROCEDURE — 71045 X-RAY EXAM CHEST 1 VIEW: CPT | Mod: 26

## 2021-03-10 PROCEDURE — 71275 CT ANGIOGRAPHY CHEST: CPT | Mod: 26,MA

## 2021-03-10 RX ORDER — MORPHINE SULFATE 50 MG/1
4 CAPSULE, EXTENDED RELEASE ORAL ONCE
Refills: 0 | Status: DISCONTINUED | OUTPATIENT
Start: 2021-03-10 | End: 2021-03-10

## 2021-03-10 RX ORDER — ASPIRIN/CALCIUM CARB/MAGNESIUM 324 MG
81 TABLET ORAL DAILY
Refills: 0 | Status: DISCONTINUED | OUTPATIENT
Start: 2021-03-10 | End: 2021-03-12

## 2021-03-10 RX ORDER — SODIUM CHLORIDE 9 MG/ML
2000 INJECTION INTRAMUSCULAR; INTRAVENOUS; SUBCUTANEOUS ONCE
Refills: 0 | Status: COMPLETED | OUTPATIENT
Start: 2021-03-10 | End: 2021-03-10

## 2021-03-10 RX ORDER — AMLODIPINE BESYLATE 2.5 MG/1
10 TABLET ORAL DAILY
Refills: 0 | Status: DISCONTINUED | OUTPATIENT
Start: 2021-03-10 | End: 2021-03-12

## 2021-03-10 RX ORDER — OXYCODONE HYDROCHLORIDE 5 MG/1
10 TABLET ORAL EVERY 4 HOURS
Refills: 0 | Status: DISCONTINUED | OUTPATIENT
Start: 2021-03-10 | End: 2021-03-12

## 2021-03-10 RX ORDER — LEVOTHYROXINE SODIUM 125 MCG
150 TABLET ORAL DAILY
Refills: 0 | Status: DISCONTINUED | OUTPATIENT
Start: 2021-03-10 | End: 2021-03-12

## 2021-03-10 RX ORDER — PANTOPRAZOLE SODIUM 20 MG/1
40 TABLET, DELAYED RELEASE ORAL
Refills: 0 | Status: DISCONTINUED | OUTPATIENT
Start: 2021-03-10 | End: 2021-03-12

## 2021-03-10 RX ORDER — ASPIRIN/CALCIUM CARB/MAGNESIUM 324 MG
81 TABLET ORAL ONCE
Refills: 0 | Status: COMPLETED | OUTPATIENT
Start: 2021-03-10 | End: 2021-03-10

## 2021-03-10 RX ADMIN — MORPHINE SULFATE 4 MILLIGRAM(S): 50 CAPSULE, EXTENDED RELEASE ORAL at 05:59

## 2021-03-10 RX ADMIN — Medication 81 MILLIGRAM(S): at 12:57

## 2021-03-10 RX ADMIN — OXYCODONE HYDROCHLORIDE 10 MILLIGRAM(S): 5 TABLET ORAL at 20:45

## 2021-03-10 RX ADMIN — SODIUM CHLORIDE 2000 MILLILITER(S): 9 INJECTION INTRAMUSCULAR; INTRAVENOUS; SUBCUTANEOUS at 04:00

## 2021-03-10 RX ADMIN — PANTOPRAZOLE SODIUM 40 MILLIGRAM(S): 20 TABLET, DELAYED RELEASE ORAL at 12:58

## 2021-03-10 RX ADMIN — OXYCODONE HYDROCHLORIDE 10 MILLIGRAM(S): 5 TABLET ORAL at 12:56

## 2021-03-10 RX ADMIN — Medication 150 MICROGRAM(S): at 12:56

## 2021-03-10 RX ADMIN — OXYCODONE HYDROCHLORIDE 10 MILLIGRAM(S): 5 TABLET ORAL at 21:15

## 2021-03-10 RX ADMIN — AMLODIPINE BESYLATE 10 MILLIGRAM(S): 2.5 TABLET ORAL at 12:57

## 2021-03-10 NOTE — ED ADULT NURSE NOTE - OBJECTIVE STATEMENT
55y M hx of GERS, hypothyroid, pancreatitis, and HTN present with neck pain and arm numbness. pt states Saturday he was sleeping on his stomach with his arm under the pillow. when he woke up he said his left arm was totally numb and he couldn't move it at all. after "shaking and hitting it" he regained feeling. however, leading up to today numbness and pain returned to the left neck and arm as well and spread down into the left leg. pt able to move all extremities and sensation intact. pt states "maybe its bad circulation and I need a cardiologist- I don't know." labs sent from triage. pending imaging. call bell in place.

## 2021-03-10 NOTE — ED CDU PROVIDER INITIAL DAY NOTE - MEDICAL DECISION MAKING DETAILS
pt placed in CDU for re-eval, tele monitoring, nuclear stress test in setting of acute pancreatitis.
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
## 2021-03-10 NOTE — H&P ADULT - ASSESSMENT
pt with above history p/w shortness of breath on exertion     abnl stress test - cards eval, ischemic w/u as per cards     Pancreatitis- pain control, Gi eval    Hypothyroidism - cont synthroid    Htn - cont home htn meds

## 2021-03-10 NOTE — ED CDU PROVIDER INITIAL DAY NOTE - NS ED ROS FT
CONSTITUTIONAL: No fevers, no chills, no lightheadedness, no dizziness  EYES: no visual changes, no eye pain  EARS: no ear drainage, no ear pain, no change in hearing  NOSE: no nasal congestion  MOUTH/THROAT: no sore throat  CV: no palpitations  RESP: no cough  GI: No n/v/d, no abd pain  : no dysuria, no hematuria, no flank pain  MSK: no back pain, no extremity pain  SKIN: no rashes  NEURO: no headache, no focal weakness CONSTITUTIONAL: No fevers, no chills, no lightheadedness, no dizziness  EYES: no visual changes, no eye pain  EARS: no ear drainage, no ear pain, no change in hearing  NOSE: no nasal congestion  MOUTH/THROAT: no sore throat  CV: +chest pain. no palpitations  RESP: no cough  GI: No n/v/d, no abd pain  : no dysuria, no hematuria, no flank pain  MSK: + left sided neck pain, +left arm numbness/weakness. no back pain, no extremity pain  SKIN: no rashes  NEURO: no headache, no focal weakness

## 2021-03-10 NOTE — ED ADULT NURSE REASSESSMENT NOTE - NS ED NURSE REASSESS COMMENT FT1
Report taken from Desi PACHECO. States patient had a good night with no complaints. Will continue to monitor.

## 2021-03-10 NOTE — ED CDU PROVIDER INITIAL DAY NOTE - PROGRESS NOTE DETAILS
Atrial fibrillation  on Eliquis  CAD (coronary artery disease)  s/p 2 vessel CABG 2009@ [place]  Cataracts, bilateral    ESRD (end stage renal disease) on dialysis  secondary to DM; HD via Left upper extremity AVF until renal transplant 7/7/18  Gout    HLD (hyperlipidemia)    HTN (hypertension)    Ischemic cardiomyopathy    SALVADOR on CPAP  home settings CPAP 14  PVD (peripheral vascular disease)    Type 2 diabetes mellitus  on Lantus and premeal sliding scale Patient resting in bed comfortably. No distress, no complaints. Vital Signs Stable. No events on telemetry monitor; pending stress test. Pt resting comfortably. NAD. No complaints. VSS. Stress with abnormal findings of infarct with areas of shane-infarct ischemia. Spoke to Dr. Samayoa and wants the pt admitted to him for angiogram. results and plan of care discussed with pt and Dr. Roque. at stress

## 2021-03-10 NOTE — ED ADULT NURSE NOTE - NSIMPLEMENTINTERV_GEN_ALL_ED
Implemented All Fall with Harm Risk Interventions:  [place] to call system. Call bell, personal items and telephone within reach. Instruct patient to call for assistance. Room bathroom lighting operational. Non-slip footwear when patient is off stretcher. Physically safe environment: no spills, clutter or unnecessary equipment. Stretcher in lowest position, wheels locked, appropriate side rails in place. Provide visual cue, wrist band, yellow gown, etc. Monitor gait and stability. Monitor for mental status changes and reorient to person, place, and time. Review medications for side effects contributing to fall risk. Reinforce activity limits and safety measures with patient and family. Provide visual clues: red socks.

## 2021-03-10 NOTE — ED CDU PROVIDER DISPOSITION NOTE - ATTENDING CONTRIBUTION TO CARE
Pt seen at bedside, labs/imaging reviewed.  Case d/w CDU PA.  To be admitted to cardiology for cath in context of +stress.  Will c/t monitor in CDU in the meantime.  --BMM
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
## 2021-03-10 NOTE — ED CDU PROVIDER INITIAL DAY NOTE - DETAILS
CHEST PAIN  -[facility]  -JUSTINMI  -Count includes the [facility] EVAL  -STRESS TEST  -CASE D/W ATTENDING
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
## 2021-03-11 LAB
A1C WITH ESTIMATED AVERAGE GLUCOSE RESULT: 5.4 % — SIGNIFICANT CHANGE UP (ref 4–5.6)
ALBUMIN SERPL ELPH-MCNC: 3.3 G/DL — SIGNIFICANT CHANGE UP (ref 3.3–5)
ALP SERPL-CCNC: 81 U/L — SIGNIFICANT CHANGE UP (ref 40–120)
ALT FLD-CCNC: 11 U/L — SIGNIFICANT CHANGE UP (ref 10–45)
ANION GAP SERPL CALC-SCNC: 13 MMOL/L — SIGNIFICANT CHANGE UP (ref 5–17)
AST SERPL-CCNC: 17 U/L — SIGNIFICANT CHANGE UP (ref 10–40)
BASOPHILS # BLD AUTO: 0.02 K/UL — SIGNIFICANT CHANGE UP (ref 0–0.2)
BASOPHILS NFR BLD AUTO: 0.4 % — SIGNIFICANT CHANGE UP (ref 0–2)
BILIRUB DIRECT SERPL-MCNC: <0.1 MG/DL — SIGNIFICANT CHANGE UP (ref 0–0.2)
BILIRUB INDIRECT FLD-MCNC: >0.2 MG/DL — SIGNIFICANT CHANGE UP (ref 0.2–1)
BILIRUB SERPL-MCNC: 0.3 MG/DL — SIGNIFICANT CHANGE UP (ref 0.2–1.2)
BUN SERPL-MCNC: 12 MG/DL — SIGNIFICANT CHANGE UP (ref 7–23)
CALCIUM SERPL-MCNC: 9 MG/DL — SIGNIFICANT CHANGE UP (ref 8.4–10.5)
CHLORIDE SERPL-SCNC: 104 MMOL/L — SIGNIFICANT CHANGE UP (ref 96–108)
CHOLEST SERPL-MCNC: 155 MG/DL — SIGNIFICANT CHANGE UP
CO2 SERPL-SCNC: 21 MMOL/L — LOW (ref 22–31)
CREAT SERPL-MCNC: 0.71 MG/DL — SIGNIFICANT CHANGE UP (ref 0.5–1.3)
EOSINOPHIL # BLD AUTO: 0.22 K/UL — SIGNIFICANT CHANGE UP (ref 0–0.5)
EOSINOPHIL NFR BLD AUTO: 4.1 % — SIGNIFICANT CHANGE UP (ref 0–6)
ESTIMATED AVERAGE GLUCOSE: 108 MG/DL — SIGNIFICANT CHANGE UP (ref 68–114)
GLUCOSE SERPL-MCNC: 72 MG/DL — SIGNIFICANT CHANGE UP (ref 70–99)
HCT VFR BLD CALC: 31 % — LOW (ref 39–50)
HDLC SERPL-MCNC: 42 MG/DL — SIGNIFICANT CHANGE UP
HGB BLD-MCNC: 10.2 G/DL — LOW (ref 13–17)
IMM GRANULOCYTES NFR BLD AUTO: 0.4 % — SIGNIFICANT CHANGE UP (ref 0–1.5)
LIPID PNL WITH DIRECT LDL SERPL: 94 MG/DL — SIGNIFICANT CHANGE UP
LYMPHOCYTES # BLD AUTO: 1.89 K/UL — SIGNIFICANT CHANGE UP (ref 1–3.3)
LYMPHOCYTES # BLD AUTO: 34.9 % — SIGNIFICANT CHANGE UP (ref 13–44)
MCHC RBC-ENTMCNC: 30.2 PG — SIGNIFICANT CHANGE UP (ref 27–34)
MCHC RBC-ENTMCNC: 32.9 GM/DL — SIGNIFICANT CHANGE UP (ref 32–36)
MCV RBC AUTO: 91.7 FL — SIGNIFICANT CHANGE UP (ref 80–100)
MONOCYTES # BLD AUTO: 0.51 K/UL — SIGNIFICANT CHANGE UP (ref 0–0.9)
MONOCYTES NFR BLD AUTO: 9.4 % — SIGNIFICANT CHANGE UP (ref 2–14)
NEUTROPHILS # BLD AUTO: 2.75 K/UL — SIGNIFICANT CHANGE UP (ref 1.8–7.4)
NEUTROPHILS NFR BLD AUTO: 50.8 % — SIGNIFICANT CHANGE UP (ref 43–77)
NON HDL CHOLESTEROL: 113 MG/DL — SIGNIFICANT CHANGE UP
NRBC # BLD: 0 /100 WBCS — SIGNIFICANT CHANGE UP (ref 0–0)
PLATELET # BLD AUTO: 345 K/UL — SIGNIFICANT CHANGE UP (ref 150–400)
POTASSIUM SERPL-MCNC: 3.9 MMOL/L — SIGNIFICANT CHANGE UP (ref 3.5–5.3)
POTASSIUM SERPL-SCNC: 3.9 MMOL/L — SIGNIFICANT CHANGE UP (ref 3.5–5.3)
PROT SERPL-MCNC: 6.6 G/DL — SIGNIFICANT CHANGE UP (ref 6–8.3)
RBC # BLD: 3.38 M/UL — LOW (ref 4.2–5.8)
RBC # FLD: 18.2 % — HIGH (ref 10.3–14.5)
SARS-COV-2 RNA SPEC QL NAA+PROBE: SIGNIFICANT CHANGE UP
SODIUM SERPL-SCNC: 138 MMOL/L — SIGNIFICANT CHANGE UP (ref 135–145)
TRIGL SERPL-MCNC: 94 MG/DL — SIGNIFICANT CHANGE UP
TSH SERPL-MCNC: 41.6 UIU/ML — HIGH (ref 0.27–4.2)
WBC # BLD: 5.41 K/UL — SIGNIFICANT CHANGE UP (ref 3.8–10.5)
WBC # FLD AUTO: 5.41 K/UL — SIGNIFICANT CHANGE UP (ref 3.8–10.5)

## 2021-03-11 RX ADMIN — AMLODIPINE BESYLATE 10 MILLIGRAM(S): 2.5 TABLET ORAL at 05:30

## 2021-03-11 RX ADMIN — PANTOPRAZOLE SODIUM 40 MILLIGRAM(S): 20 TABLET, DELAYED RELEASE ORAL at 05:31

## 2021-03-11 RX ADMIN — OXYCODONE HYDROCHLORIDE 10 MILLIGRAM(S): 5 TABLET ORAL at 18:08

## 2021-03-11 RX ADMIN — OXYCODONE HYDROCHLORIDE 10 MILLIGRAM(S): 5 TABLET ORAL at 17:11

## 2021-03-11 RX ADMIN — Medication 150 MICROGRAM(S): at 05:31

## 2021-03-11 RX ADMIN — Medication 81 MILLIGRAM(S): at 09:12

## 2021-03-12 ENCOUNTER — TRANSCRIPTION ENCOUNTER (OUTPATIENT)
Age: 56
End: 2021-03-12

## 2021-03-12 VITALS
OXYGEN SATURATION: 99 % | DIASTOLIC BLOOD PRESSURE: 75 MMHG | RESPIRATION RATE: 18 BRPM | SYSTOLIC BLOOD PRESSURE: 119 MMHG | HEART RATE: 77 BPM | TEMPERATURE: 98 F

## 2021-03-12 DIAGNOSIS — E03.8 OTHER SPECIFIED HYPOTHYROIDISM: ICD-10-CM

## 2021-03-12 DIAGNOSIS — K21.9 GASTRO-ESOPHAGEAL REFLUX DISEASE WITHOUT ESOPHAGITIS: ICD-10-CM

## 2021-03-12 DIAGNOSIS — I10 ESSENTIAL (PRIMARY) HYPERTENSION: ICD-10-CM

## 2021-03-12 LAB
ANION GAP SERPL CALC-SCNC: 10 MMOL/L — SIGNIFICANT CHANGE UP (ref 5–17)
BUN SERPL-MCNC: 14 MG/DL — SIGNIFICANT CHANGE UP (ref 7–23)
CALCIUM SERPL-MCNC: 9.6 MG/DL — SIGNIFICANT CHANGE UP (ref 8.4–10.5)
CHLORIDE SERPL-SCNC: 104 MMOL/L — SIGNIFICANT CHANGE UP (ref 96–108)
CO2 SERPL-SCNC: 22 MMOL/L — SIGNIFICANT CHANGE UP (ref 22–31)
CREAT SERPL-MCNC: 0.77 MG/DL — SIGNIFICANT CHANGE UP (ref 0.5–1.3)
GLUCOSE SERPL-MCNC: 88 MG/DL — SIGNIFICANT CHANGE UP (ref 70–99)
POTASSIUM SERPL-MCNC: 4 MMOL/L — SIGNIFICANT CHANGE UP (ref 3.5–5.3)
POTASSIUM SERPL-SCNC: 4 MMOL/L — SIGNIFICANT CHANGE UP (ref 3.5–5.3)
SODIUM SERPL-SCNC: 136 MMOL/L — SIGNIFICANT CHANGE UP (ref 135–145)

## 2021-03-12 PROCEDURE — 0031A: CPT

## 2021-03-12 PROCEDURE — 99285 EMERGENCY DEPT VISIT HI MDM: CPT | Mod: 25

## 2021-03-12 PROCEDURE — 84436 ASSAY OF TOTAL THYROXINE: CPT

## 2021-03-12 PROCEDURE — 84100 ASSAY OF PHOSPHORUS: CPT

## 2021-03-12 PROCEDURE — 93458 L HRT ARTERY/VENTRICLE ANGIO: CPT

## 2021-03-12 PROCEDURE — 83690 ASSAY OF LIPASE: CPT

## 2021-03-12 PROCEDURE — U0005: CPT

## 2021-03-12 PROCEDURE — A9500: CPT

## 2021-03-12 PROCEDURE — 83880 ASSAY OF NATRIURETIC PEPTIDE: CPT

## 2021-03-12 PROCEDURE — 84480 ASSAY TRIIODOTHYRONINE (T3): CPT

## 2021-03-12 PROCEDURE — 96374 THER/PROPH/DIAG INJ IV PUSH: CPT

## 2021-03-12 PROCEDURE — 71275 CT ANGIOGRAPHY CHEST: CPT

## 2021-03-12 PROCEDURE — 78452 HT MUSCLE IMAGE SPECT MULT: CPT

## 2021-03-12 PROCEDURE — 85025 COMPLETE CBC W/AUTO DIFF WBC: CPT

## 2021-03-12 PROCEDURE — C1769: CPT

## 2021-03-12 PROCEDURE — 99153 MOD SED SAME PHYS/QHP EA: CPT

## 2021-03-12 PROCEDURE — 82248 BILIRUBIN DIRECT: CPT

## 2021-03-12 PROCEDURE — 80048 BASIC METABOLIC PNL TOTAL CA: CPT

## 2021-03-12 PROCEDURE — 85730 THROMBOPLASTIN TIME PARTIAL: CPT

## 2021-03-12 PROCEDURE — 99223 1ST HOSP IP/OBS HIGH 75: CPT

## 2021-03-12 PROCEDURE — 84443 ASSAY THYROID STIM HORMONE: CPT

## 2021-03-12 PROCEDURE — 83735 ASSAY OF MAGNESIUM: CPT

## 2021-03-12 PROCEDURE — 80053 COMPREHEN METABOLIC PANEL: CPT

## 2021-03-12 PROCEDURE — 93017 CV STRESS TEST TRACING ONLY: CPT

## 2021-03-12 PROCEDURE — 74174 CTA ABD&PLVS W/CONTRAST: CPT

## 2021-03-12 PROCEDURE — 85610 PROTHROMBIN TIME: CPT

## 2021-03-12 PROCEDURE — 71045 X-RAY EXAM CHEST 1 VIEW: CPT

## 2021-03-12 PROCEDURE — U0003: CPT

## 2021-03-12 PROCEDURE — 83036 HEMOGLOBIN GLYCOSYLATED A1C: CPT

## 2021-03-12 PROCEDURE — C1887: CPT

## 2021-03-12 PROCEDURE — 84484 ASSAY OF TROPONIN QUANT: CPT

## 2021-03-12 PROCEDURE — C1894: CPT

## 2021-03-12 PROCEDURE — 99152 MOD SED SAME PHYS/QHP 5/>YRS: CPT

## 2021-03-12 PROCEDURE — 80061 LIPID PANEL: CPT

## 2021-03-12 RX ORDER — LEVOTHYROXINE SODIUM 125 MCG
1 TABLET ORAL
Qty: 30 | Refills: 0
Start: 2021-03-12 | End: 2021-04-10

## 2021-03-12 RX ORDER — JNJ-78436735 50000000000 [PFU]/.5ML
0.5 SUSPENSION INTRAMUSCULAR ONCE
Refills: 0 | Status: COMPLETED | OUTPATIENT
Start: 2021-03-12 | End: 2021-03-12

## 2021-03-12 RX ORDER — ASPIRIN/CALCIUM CARB/MAGNESIUM 324 MG
1 TABLET ORAL
Qty: 0 | Refills: 0 | DISCHARGE
Start: 2021-03-12

## 2021-03-12 RX ADMIN — Medication 150 MICROGRAM(S): at 05:39

## 2021-03-12 RX ADMIN — PANTOPRAZOLE SODIUM 40 MILLIGRAM(S): 20 TABLET, DELAYED RELEASE ORAL at 05:39

## 2021-03-12 RX ADMIN — AMLODIPINE BESYLATE 10 MILLIGRAM(S): 2.5 TABLET ORAL at 05:39

## 2021-03-12 RX ADMIN — Medication 81 MILLIGRAM(S): at 12:34

## 2021-03-12 RX ADMIN — OXYCODONE HYDROCHLORIDE 10 MILLIGRAM(S): 5 TABLET ORAL at 01:15

## 2021-03-12 RX ADMIN — OXYCODONE HYDROCHLORIDE 10 MILLIGRAM(S): 5 TABLET ORAL at 00:32

## 2021-03-12 RX ADMIN — OXYCODONE HYDROCHLORIDE 10 MILLIGRAM(S): 5 TABLET ORAL at 16:47

## 2021-03-12 RX ADMIN — JNJ-78436735 0.5 MILLILITER(S): 50000000000 SUSPENSION INTRAMUSCULAR at 16:35

## 2021-03-12 NOTE — DISCHARGE NOTE NURSING/CASE MANAGEMENT/SOCIAL WORK - NSDCVIVACCINE_GEN_ALL_CORE_FT
No Vaccines Administered. Severe acute respiratory syndrome coronavirus 2 (SARS-CoV-2) (Coronavirus disease [COVID-19]) vaccine , 2021/3/12 16:35 , Darling Eisenberg)

## 2021-03-12 NOTE — PROGRESS NOTE ADULT - ATTENDING COMMENTS
had extensive lengthy discussion with pt about pts current clinical status , management plan   all questions , concerns addressed   [facility] code [id number]

## 2021-03-12 NOTE — CHART NOTE - NSCHARTNOTEFT_GEN_A_CORE
RRB removed with no complications, no bleeding, no hematoma, site soft, non tender, dry sterile dressing applied. Instructions reviewed with pt and RN on floor, will cont to monitor.

## 2021-03-12 NOTE — DISCHARGE NOTE PROVIDER - CARE PROVIDER_API CALL
Rodriguez Rocha (MD)  Gastroenterology; Internal Medicine  Division of Gastroenterology - [facility], [address] [place] [address]  Phone: [phone]  Fax: [phone]  Follow Up Time: 1 week    Ham Jacobson (DO)  EndocrinologyMetabDiabetes; Internal Medicine  [address] [place] [address]  Phone: [phone]  Fax: [phone]  Follow Up Time: 1 month    Hal Agosto  CARDIOVASCULAR DISEASE  [address] [place] [address]  Phone: [phone]  Fax: ()-  Follow Up Time: 1 week

## 2021-03-12 NOTE — CONSULT NOTE ADULT - PROBLEM SELECTOR RECOMMENDATION 9
Uncontrolled hypothyroidism likely due to malabsorption of levothyroxine as he takes his levothyroxine with a PPI. Recommend separate the medications and take the levothyroxine in empty stomach as soon as he wakes up. Can increase dose to 175 mcg for now given symptoms. Repeat TFTs in 4-6 weeks with possible need to decrease back down if no longer taking with PPI and absorbing the medication better.

## 2021-03-12 NOTE — DISCHARGE NOTE PROVIDER - HOSPITAL COURSE
55 year old M history of HTN, former smoker and alcoholic pancreatitis presents with L arm numbness followed by L arm and L leg decreased sensation/parasthesia and SOB on exertion. Pt with abnormal stress test.  work up. 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 Pt was initially admitted for alcoholic pancreatitis in 04/2020 with MRCP with uncinate necosis, at that time was found to have portal vein thrombosis and started Eliquis, then had CT showing pancreatic head mass, s/p EUS 08/2020 that showed 3.6cm WOPN at tail, surrounded by collateral blood vessel so axious not placed.    Last MRCP showed increased size pancreatic tail walled off necrosis which appears contiguous with the pancreatic duct (4.6x3.2cm), increased from 09/16/2020, decrease in intraperitoneal and retroperitoneal parapancreatic collection, particularly large sac collection.    CT now showed stable size of collection in the uncinate process which measures 2.4x1.8cm, a lesser sac collection extending from panc tail to gastric wall measure 5.1x4.5.4.4cm. previously seen panc head collection not well visualize in the study. Peripancreatic inflammation compatible with acute pancreatitis.   Pt known have walled off pancreatic necrosis which as increased in size as well additional multiple collections. Currently pt is asymptomatic, denies abdominal pain. Labs wnl.    Recommendations:  -Will likely need necrosectomy and cystgastrostomy at some point, likely as an outpatient  -Continue work up of SOB and numbness  -Monitor CBC, CMP     55 year old M history of HTN, former smoker and alcoholic pancreatitis presents with L arm numbness followed by L arm and L leg decreased sensation/parasthesia and SOB on exertion. Trop neg; EKG with no changes. Xray with no evidence of CHF. s/p stress test that was positive with reversible ischemia. s/p cardiac cath showing minor luminal irregularities. cleared by cardiology for discharge.  seen by Gi for chronic pancreatitis. . pt with  known history of  walled off pancreatic necrosis which as increased in size as well additional multiple collections. Currently pt is asymptomatic, denies abdominal pain. Labs wnl. Gi  recommends outpt follow up for likely  necrosectomy and cystgastrostomy at some point. TSH noted to be 42. seen by endocrine.      55 year old M history of HTN, former smoker and alcoholic pancreatitis presents with L arm numbness followed by L arm and L leg decreased sensation/parasthesia and SOB on exertion. Trop neg; EKG with no changes. Xray with no evidence of CHF. s/p stress test that was positive with reversible ischemia. s/p cardiac cath showing minor luminal irregularities. cleared by cardiology for discharge.  seen by Gi for chronic pancreatitis. . pt with  known history of  walled off pancreatic necrosis which as increased in size as well additional multiple collections. Currently pt is asymptomatic, denies abdominal pain. Labs wnl. Gi  recommends outpt follow up for likely  necrosectomy and cystgastrostomy at some point. TSH noted to be 42. seen by endocrine. likely from  inadequate absorption from taking with PPI. advsied temporarily increasing dose to 175 mcg and repeating lab work. Discharged home.

## 2021-03-12 NOTE — DISCHARGE NOTE PROVIDER - PROVIDER TOKENS
PROVIDER:[TOKEN:[[id number]],FOLLOWUP:[1 week]],PROVIDER:[TOKEN:[[id number]],FOLLOWUP:[1 month]],PROVIDER:[TOKEN:[[id number]],FOLLOWUP:[1 week]]

## 2021-03-12 NOTE — DISCHARGE NOTE PROVIDER - NSDCMRMEDTOKEN_GEN_ALL_CORE_FT
acetaminophen 500 mg oral tablet: 2 tab(s) orally every 6 hours, As needed, Mild Pain (1 - 3)  amLODIPine 10 mg oral tablet: 1 tab(s) orally once a day  ibuprofen 600 mg oral tablet: 1 tab(s) orally every 6 hours, As needed, Moderate Pain (4 - 6)  levothyroxine 150 mcg (0.15 mg) oral tablet: 1 tab(s) orally once a day  oxyCODONE 10 mg oral tablet: 1 tab(s) orally every 4 hours, As needed, Severe Pain (7 - 10) MDD:5  Protonix 20 mg oral delayed release tablet: 1 tab(s) orally once a day, As Needed   acetaminophen 500 mg oral tablet: 2 tab(s) orally every 6 hours, As needed, Mild Pain (1 - 3)  amLODIPine 10 mg oral tablet: 1 tab(s) orally once a day  aspirin 81 mg oral delayed release tablet: 1 tab(s) orally once a day  levothyroxine 150 mcg (0.15 mg) oral tablet: 1 tab(s) orally once a day  oxyCODONE 10 mg oral tablet: 1 tab(s) orally every 4 hours, As needed, Severe Pain (7 - 10) MDD:5  Protonix 20 mg oral delayed release tablet: 1 tab(s) orally once a day, As Needed   acetaminophen 500 mg oral tablet: 2 tab(s) orally every 6 hours, As needed, Mild Pain (1 - 3)  amLODIPine 10 mg oral tablet: 1 tab(s) orally once a day  aspirin 81 mg oral delayed release tablet: 1 tab(s) orally once a day  levothyroxine 175 mcg (0.175 mg) oral tablet: 1 tab(s) orally once a day   oxyCODONE 10 mg oral tablet: 1 tab(s) orally every 4 hours, As needed, Severe Pain (7 - 10) MDD:5  Protonix 20 mg oral delayed release tablet: 1 tab(s) orally once a day, As Needed
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
## 2021-03-12 NOTE — PROGRESS NOTE ADULT - SUBJECTIVE AND OBJECTIVE BOX
Carlos A Samayoa MD  Interventional Cardiology / Advance Heart Failure and Cardiac Transplant Specialist  [place] Office : [address] [place] [address] [place] [address]  Tel:   [place] Office : [address] [facility] [place] [address]  Tel: [phone]  Cell : [phone]    Pt is lying in bed comfortable not in distress, no chest pains no SOB no palpitations s/p cath normal coronaries  	  MEDICATIONS:  amLODIPine   Tablet 10 milliGRAM(s) Oral daily  aspirin enteric coated 81 milliGRAM(s) Oral daily        oxyCODONE    IR 10 milliGRAM(s) Oral every 4 hours PRN    pantoprazole    Tablet 40 milliGRAM(s) Oral before breakfast    levothyroxine 150 MICROGram(s) Oral daily        PAST MEDICAL/SURGICAL HISTORY  PAST MEDICAL & SURGICAL HISTORY:  Pancreatitis    Hypertension    Hernia    Hypothyroid    History of back surgery        SOCIAL HISTORY: Substance Use (street drugs): ( x ) never used  (  ) other:    FAMILY HISTORY:  No pertinent family history in first degree relatives         PHYSICAL EXAM:  T(C): 36.7 (03-12-21 @ 09:28), Max: 37.3 (03-12-21 @ 04:19)  HR: 86 (03-12-21 @ 12:00) (75 - 98)  BP: 113/78 (03-12-21 @ 12:00) (113/78 - 133/97)  RR: 16 (03-12-21 @ 12:00) (16 - 18)  SpO2: 99% (03-12-21 @ 12:00) (97% - 100%)  Wt(kg): --  I&O's Summary    11 Mar 2021 07:01  -  12 Mar 2021 07:00  --------------------------------------------------------  IN: 360 mL / OUT: 1850 mL / NET: -1490 mL    12 Mar 2021 07:01  -  12 Mar 2021 12:16  --------------------------------------------------------  IN: 120 mL / OUT: 0 mL / NET: 120 mL             EYES: EOMI, PERRLA, conjunctiva and sclera clear  ENMT: No tonsillar erythema, exudates, or enlargement; Moist mucous membranes, Good dentition, No lesions  Cardiovascular: Normal S1 S2, No JVD, No murmurs, No edema  Respiratory: Lungs clear to auscultation	  Gastrointestinal:  Soft, Non-tender, + BS	  Extremities: no edema                                    10.2   5.41  )-----------( 345      ( 11 Mar 2021 06:52 )             31.0     03-12    136  |  104  |  14  ----------------------------<  88  4.0   |  22  |  0.77    Ca    9.6      12 Mar 2021 06:27    TPro  6.6  /  Alb  3.3  /  TBili  0.3  /  DBili  <0.1  /  AST  17  /  ALT  11  /  AlkPhos  81  03-11    proBNP:   Lipid Profile:   HgA1c:   TSH: Thyroid Stimulating Hormone, Serum: 41.60 uIU/mL (03-11 @ 15:08)      Consultant(s) Notes Reviewed:  [x ] YES  [ ] NO    Care Discussed with Consultants/Other Providers [ x] YES  [ ] NO    Imaging Personally Reviewed independently:  [x] YES  [ ] NO    All labs, radiologic studies, vitals, orders and medications list reviewed. Patient is seen and examined at bedside. Case discussed with medical team.        
    SUBJECTIVE / OVERNIGHT EVENTS: pt seen and examined  denies abd p[ain, nausea, vomiting       MEDICATIONS  (STANDING):  amLODIPine   Tablet 10 milliGRAM(s) Oral daily  aspirin enteric coated 81 milliGRAM(s) Oral daily  levothyroxine 150 MICROGram(s) Oral daily  pantoprazole    Tablet 40 milliGRAM(s) Oral before breakfast    MEDICATIONS  (PRN):  oxyCODONE    IR 10 milliGRAM(s) Oral every 4 hours PRN Severe Pain (7 - 10)    Vital Signs Last 24 Hrs  T(C): 36.5 (11 Mar 2021 20:18), Max: 37.2 (11 Mar 2021 15:31)  T(F): 97.7 (11 Mar 2021 20:18), Max: 98.9 (11 Mar 2021 15:31)  HR: 81 (11 Mar 2021 20:18) (80 - 83)  BP: 122/77 (11 Mar 2021 20:18) (111/79 - 128/79)  BP(mean): --  RR: 18 (11 Mar 2021 20:18) (18 - 18)  SpO2: 100% (11 Mar 2021 20:18) (98% - 100%)    CAPILLARY BLOOD GLUCOSE        I&O's Summary    10 Mar 2021 07:01  -  11 Mar 2021 07:00  --------------------------------------------------------  IN: 240 mL / OUT: 150 mL / NET: 90 mL    11 Mar 2021 07:01  -  11 Mar 2021 22:26  --------------------------------------------------------  IN: 360 mL / OUT: 1650 mL / NET: -1290 mL        Constitutional: No fever, fatigue  Skin: No rash.  Eyes: No recent vision problems or eye pain.  ENT: No congestion, ear pain, or sore throat.  Cardiovascular: No chest pain or palpation.  Respiratory: No cough, shortness of breath, congestion, or wheezing.  Gastrointestinal: No abdominal pain, nausea, vomiting, or diarrhea.  Genitourinary: No dysuria.  Musculoskeletal: No joint swelling.  Neurologic: No headache.    PHYSICAL EXAM:  GENERAL: NAD  EYES: EOMI, PERRLA  NECK: Supple, No JVD  CHEST/LUNG: cta jenni  HEART:  S1 , S2 +  ABDOMEN: soft , bs+  EXTREMITIES:  no edema  NEUROLOGY:alert awake oriented x3      LABS:                        10.2   5.41  )-----------( 345      ( 11 Mar 2021 06:52 )             31.0     03-11    138  |  104  |  12  ----------------------------<  72  3.9   |  21<L>  |  0.71    Ca    9.0      11 Mar 2021 07:27    TPro  6.6  /  Alb  3.3  /  TBili  0.3  /  DBili  <0.1  /  AST  17  /  ALT  11  /  AlkPhos  81  03-11              RADIOLOGY & ADDITIONAL TESTS:    Imaging Personally Reviewed:    Consultant(s) Notes Reviewed:      Care Discussed with Consultants/Other Providers:  
    SUBJECTIVE / OVERNIGHT EVENTS: pt seen and examined  denies abd p[ain, nausea, vomiting       MEDICATIONS  (STANDING):  amLODIPine   Tablet 10 milliGRAM(s) Oral daily  aspirin enteric coated 81 milliGRAM(s) Oral daily  levothyroxine 150 MICROGram(s) Oral daily  pantoprazole    Tablet 40 milliGRAM(s) Oral before breakfast    MEDICATIONS  (PRN):  oxyCODONE    IR 10 milliGRAM(s) Oral every 4 hours PRN Severe Pain (7 - 10)    Vital Signs Last 24 Hrs  T(C): 36.9 (12 Mar 2021 12:15), Max: 37.3 (12 Mar 2021 04:19)  T(F): 98.4 (12 Mar 2021 12:15), Max: 99.1 (12 Mar 2021 04:19)  HR: 77 (12 Mar 2021 12:15) (75 - 98)  BP: 119/75 (12 Mar 2021 12:15) (113/78 - 133/97)  BP(mean): --  RR: 18 (12 Mar 2021 12:15) (16 - 18)  SpO2: 99% (12 Mar 2021 12:15) (97% - 100%)      Constitutional: No fever, fatigue  Skin: No rash.  Eyes: No recent vision problems or eye pain.  ENT: No congestion, ear pain, or sore throat.  Cardiovascular: No chest pain or palpation.  Respiratory: No cough, shortness of breath, congestion, or wheezing.  Gastrointestinal: No abdominal pain, nausea, vomiting, or diarrhea.  Genitourinary: No dysuria.  Musculoskeletal: No joint swelling.  Neurologic: No headache.    PHYSICAL EXAM:  GENERAL: NAD  EYES: EOMI, PERRLA  NECK: Supple, No JVD  CHEST/LUNG: cta jenni  HEART:  S1 , S2 +  ABDOMEN: soft , bs+  EXTREMITIES:  no edema  NEUROLOGY:alert awake oriented x3    LABS:  03-12    136  |  104  |  14  ----------------------------<  88  4.0   |  22  |  0.77    Ca    9.6      12 Mar 2021 06:27    TPro  6.6  /  Alb  3.3  /  TBili  0.3  /  DBili  <0.1  /  AST  17  /  ALT  11  /  AlkPhos  81  03-11    Creatinine Trend: 0.77 <--, 0.71 <--, 0.89 <--                        10.2   5.41  )-----------( 345      ( 11 Mar 2021 06:52 )             31.0     Urine Studies:            LIVER FUNCTIONS - ( 11 Mar 2021 07:27 )  Alb: 3.3 g/dL / Pro: 6.6 g/dL / ALK PHOS: 81 U/L / ALT: 11 U/L / AST: 17 U/L / GGT: x                 RADIOLOGY & ADDITIONAL TESTS:    Imaging Personally Reviewed:    Consultant(s) Notes Reviewed:      Care Discussed with Consultants/Other Providers:  
Carlos A Samayoa MD  Interventional Cardiology / Advance Heart Failure and Cardiac Transplant Specialist  [place] Office : [address] [place] [address] [place] [address]  Tel:   [place] Office : [address] [facility] [place] [address]  Tel: [phone]  Cell : [phone]    Pt is lying in bed comfortable not in distress, no chest pains no SOB no palpitations  	  MEDICATIONS:  amLODIPine   Tablet 10 milliGRAM(s) Oral daily  aspirin enteric coated 81 milliGRAM(s) Oral daily  oxyCODONE    IR 10 milliGRAM(s) Oral every 4 hours PRN  pantoprazole    Tablet 40 milliGRAM(s) Oral before breakfast  levothyroxine 150 MICROGram(s) Oral daily        PAST MEDICAL/SURGICAL HISTORY  PAST MEDICAL & SURGICAL HISTORY:  Pancreatitis    Hypertension    Hernia    Hypothyroid    History of back surgery        SOCIAL HISTORY: Substance Use (street drugs): ( x ) never used  (  ) other:    FAMILY HISTORY:  No pertinent family history in first degree relatives        REVIEW OF SYSTEMS:  CONSTITUTIONAL: No fever, weight loss, or fatigue  EYES: No eye pain, visual disturbances, or discharge  ENMT:  No difficulty hearing, tinnitus, vertigo; No sinus or throat pain  BREASTS: No pain, masses, or nipple discharge  GASTROINTESTINAL: No abdominal or epigastric pain. No nausea, vomiting, or hematemesis; No diarrhea or constipation. No melena or hematochezia.  GENITOURINARY: No dysuria, frequency, hematuria, or incontinence  NEUROLOGICAL: No headaches, memory loss, loss of strength, numbness, or tremors  ENDOCRINE: No heat or cold intolerance; No hair loss  MUSCULOSKELETAL: No joint pain or swelling; No muscle, back, or extremity pain  PSYCHIATRIC: No depression, anxiety, mood swings, or difficulty sleeping  HEME/LYMPH: No easy bruising, or bleeding gums  All others negative    PHYSICAL EXAM:  T(C): 36.5 (03-11-21 @ 20:18), Max: 37.2 (03-11-21 @ 15:31)  HR: 81 (03-11-21 @ 20:18) (80 - 83)  BP: 122/77 (03-11-21 @ 20:18) (111/79 - 128/79)  RR: 18 (03-11-21 @ 20:18) (18 - 18)  SpO2: 100% (03-11-21 @ 20:18) (98% - 100%)  Wt(kg): --  I&O's Summary    10 Mar 2021 07:01  -  11 Mar 2021 07:00  --------------------------------------------------------  IN: 240 mL / OUT: 150 mL / NET: 90 mL    11 Mar 2021 07:01  -  11 Mar 2021 21:25  --------------------------------------------------------  IN: 360 mL / OUT: 1650 mL / NET: -1290 mL      Height (cm): 180.3 (03-11 @ 04:40)  Weight (kg): 74.8 (03-11 @ 04:40)  BMI (kg/m2): 23 (03-11 @ 04:40)  BSA (m2): 1.94 (03-11 @ 04:40)    GENERAL: NAD  EYES: EOMI, PERRLA, conjunctiva and sclera clear  ENMT: No tonsillar erythema, exudates, or enlargement; Moist mucous membranes, Good dentition, No lesions  Cardiovascular: Normal S1 S2, No JVD, No murmurs, No edema  Respiratory: Lungs clear to auscultation	  Gastrointestinal:  Soft, Non-tender, + BS	  Extremities: Normal range of motion, No clubbing, cyanosis or edema  LYMPH: No lymphadenopathy noted  NERVOUS SYSTEM:  Alert & Oriented X3, Good concentration; Motor Strength 5/5 B/L upper and lower extremities; DTRs 2+ intact and symmetric                                    10.2   5.41  )-----------( 345      ( 11 Mar 2021 06:52 )             31.0     03-11    138  |  104  |  12  ----------------------------<  72  3.9   |  21<L>  |  0.71    Ca    9.0      11 Mar 2021 07:27  Phos  3.6     03-09  Mg     2.0     03-09    TPro  6.6  /  Alb  3.3  /  TBili  0.3  /  DBili  <0.1  /  AST  17  /  ALT  11  /  AlkPhos  81  03-11    proBNP:   Lipid Profile:   HgA1c:   TSH: Thyroid Stimulating Hormone, Serum: 41.60 uIU/mL (03-11 @ 15:08)      Consultant(s) Notes Reviewed:  [x ] YES  [ ] NO    Care Discussed with Consultants/Other Providers [ x] YES  [ ] NO    Imaging Personally Reviewed independently:  [x] YES  [ ] NO    All labs, radiologic studies, vitals, orders and medications list reviewed. Patient is seen and examined at bedside. Case discussed with medical team.

## 2021-03-12 NOTE — DISCHARGE NOTE PROVIDER - CARE PROVIDERS DIRECT ADDRESSES
[email or web address]

## 2021-03-12 NOTE — CONSULT NOTE ADULT - PROBLEM SELECTOR RECOMMENDATION 3
c/w PPI  Will sign off at this time. Please reconsult if needed.      Ham Jacobson D.O  [phone]

## 2021-03-12 NOTE — CONSULT NOTE ADULT - ASSESSMENT
Impression  # Peripancreatic fluid collection - Pt known have walled off pancreatic necrosis which as increased in size as well additional multiple collections. Currently pt is asymptomatic, denies abdominal pain. Labs wnl.    Recommendations:  -Will likely need necrosectomy and cystgastrostomy at some point, likely as an outpatient  -Continue work up of SOB and numbness  -Monitor CBC, CMP    Debra Tsai, PGY6  Gastroenterology Fellow  Pager # [phone] / [phone]  Can be contacted via [employer] Teams    For nights/weekends/holidays, contact on-call GI fellow via answering service ([phone]) 
54 y/o M w/ hypothyroidism

## 2021-03-12 NOTE — PROGRESS NOTE ADULT - REASON FOR ADMISSION
shortness of breath on exertion

## 2021-04-19 ENCOUNTER — APPOINTMENT (OUTPATIENT)
Dept: PAIN MANAGEMENT | Facility: CLINIC | Age: 56
End: 2021-04-19
Payer: COMMERCIAL

## 2021-04-19 VITALS
WEIGHT: 172 LBS | HEART RATE: 98 BPM | BODY MASS INDEX: 24.08 KG/M2 | HEIGHT: 71 IN | SYSTOLIC BLOOD PRESSURE: 142 MMHG | DIASTOLIC BLOOD PRESSURE: 86 MMHG

## 2021-04-19 VITALS — TEMPERATURE: 97.7 F

## 2021-04-19 DIAGNOSIS — M54.12 RADICULOPATHY, CERVICAL REGION: ICD-10-CM

## 2021-04-19 PROCEDURE — 99215 OFFICE O/P EST HI 40 MIN: CPT

## 2021-04-19 PROCEDURE — 99072 ADDL SUPL MATRL&STAF TM PHE: CPT

## 2021-05-01 NOTE — HISTORY OF PRESENT ILLNESS
[FreeTextEntry1] : Patient reports, 1 month ago, having had been admitted for chest pain. ruled out cardiac source..In addition co painful numbness entire left side of body excluding the face.  These sensations lasted for approximately one week but still co left neck and shoulder pain. . No brain/ c spine imaging. Imaging of pancreas was apparently inflamed but not a source of pain/numbness.\par \par Oxycodone 7.5 mgs up to tid. Currently out of medication.

## 2021-05-01 NOTE — PHYSICAL EXAM
[General Appearance - Alert] : alert [Affect] : the affect was normal [Person] : oriented to person [Place] : oriented to place [Time] : oriented to time [Cranial Nerves Oculomotor (III)] : extraocular motion intact [Motor Tone] : muscle tone was normal in all four extremities [Motor Strength] : muscle strength was normal in all four extremities [Involuntary Movements] : no involuntary movements were seen [No Muscle Atrophy] : normal bulk in all four extremities [1+] : Ankle jerk left 1+ [Sclera] : the sclera and conjunctiva were normal [Outer Ear] : the ears and nose were normal in appearance [Neck Appearance] : the appearance of the neck was normal [Nail Clubbing] : no clubbing  or cyanosis of the fingernails [Musculoskeletal - Swelling] : no joint swelling seen [] : no rash [___] : absent on the right [FreeTextEntry7] : DIMINISHED SENSATION TO TOUCH RIGHT UPPER AND LOWER EXTREMITY (Chronic) [FreeTextEntry8] : slight antalgic gait

## 2021-05-01 NOTE — ASSESSMENT
[FreeTextEntry1] : Chronic pain - relatively stable\par New onset left side dysesthesias - spontaneously resolved.\par Exam - no new changes\par \par Will recommend MRI brain/ C spine\par Take ASA 81 mgs for now

## 2021-05-13 ENCOUNTER — APPOINTMENT (OUTPATIENT)
Dept: MRI IMAGING | Facility: CLINIC | Age: 56
End: 2021-05-13
Payer: COMMERCIAL

## 2021-05-13 ENCOUNTER — RESULT REVIEW (OUTPATIENT)
Age: 56
End: 2021-05-13

## 2021-05-13 ENCOUNTER — OUTPATIENT (OUTPATIENT)
Dept: OUTPATIENT SERVICES | Facility: HOSPITAL | Age: 56
LOS: 1 days | End: 2021-05-13
Payer: COMMERCIAL

## 2021-05-13 DIAGNOSIS — M54.12 RADICULOPATHY, CERVICAL REGION: ICD-10-CM

## 2021-05-13 DIAGNOSIS — Z98.890 OTHER SPECIFIED POSTPROCEDURAL STATES: Chronic | ICD-10-CM

## 2021-05-13 DIAGNOSIS — I63.411 CEREBRAL INFARCTION DUE TO EMBOLISM OF RIGHT MIDDLE CEREBRAL ARTERY: ICD-10-CM

## 2021-05-13 PROCEDURE — 70551 MRI BRAIN STEM W/O DYE: CPT

## 2021-05-13 PROCEDURE — 72141 MRI NECK SPINE W/O DYE: CPT | Mod: 26

## 2021-05-13 PROCEDURE — 70551 MRI BRAIN STEM W/O DYE: CPT | Mod: 26

## 2021-05-13 PROCEDURE — 72141 MRI NECK SPINE W/O DYE: CPT

## 2021-06-03 ENCOUNTER — APPOINTMENT (OUTPATIENT)
Dept: PAIN MANAGEMENT | Facility: CLINIC | Age: 56
End: 2021-06-03
Payer: COMMERCIAL

## 2021-06-03 VITALS
SYSTOLIC BLOOD PRESSURE: 121 MMHG | HEART RATE: 87 BPM | DIASTOLIC BLOOD PRESSURE: 75 MMHG | HEIGHT: 71 IN | BODY MASS INDEX: 24.08 KG/M2 | WEIGHT: 172 LBS

## 2021-06-03 PROCEDURE — 99214 OFFICE O/P EST MOD 30 MIN: CPT

## 2021-06-05 NOTE — ASSESSMENT
[FreeTextEntry1] : Left-sided body paresthesias including the face has subsided for the most part.  However still gets them on occasion.  His chronic pain is relatively stable.  His examination is essentially nonfocal.  I reviewed the MRI of the brain and the C-spine with the patient.  I am not sure as to the exact etiology of the symptoms but they have been improving.  I will continue to monitor.  I do not think aspirin 81 mg is indicated at this time.  He is doing well with his oxycodone and will continue the same.  His quality life is fair.\par No signs of toxicity and will continue to monitor.  He will follow up in 6 weeks.

## 2021-06-05 NOTE — PHYSICAL EXAM
[General Appearance - Alert] : alert [Affect] : the affect was normal [Person] : oriented to person [Place] : oriented to place [Time] : oriented to time [Cranial Nerves Oculomotor (III)] : extraocular motion intact [Motor Tone] : muscle tone was normal in all four extremities [Motor Strength] : muscle strength was normal in all four extremities [No Muscle Atrophy] : normal bulk in all four extremities [Involuntary Movements] : no involuntary movements were seen [1+] : Ankle jerk left 1+ [___] : absent on the right [FreeTextEntry7] : DIMINISHED SENSATION TO TOUCH RIGHT UPPER AND LOWER EXTREMITY (Chronic) [FreeTextEntry8] : slight antalgic gait [Sclera] : the sclera and conjunctiva were normal [Outer Ear] : the ears and nose were normal in appearance [Nail Clubbing] : no clubbing  or cyanosis of the fingernails [Neck Appearance] : the appearance of the neck was normal [Musculoskeletal - Swelling] : no joint swelling seen [] : no rash

## 2021-06-05 NOTE — HISTORY OF PRESENT ILLNESS
[FreeTextEntry1] : \par Since last visit patient continues to complain of intermittent left-sided dysesthesias excluding the face.  These episodes are not triggered by any specific movement.  They can last minutes to hours up to 1 week and then spontaneously resolved.  He had recent imaging of the brain and cervical spine.  He continues to report that oxycodone 7/2 mg 2-3 times a day helps to alleviate his pain.  He denies any side effects to the medication.  Quality of life: Fair.  Numerical analog scale 6/10 on average

## 2021-07-06 NOTE — HISTORY OF PRESENT ILLNESS
[de-identified] : Mr. TREE NORWOOD is a 54 year old male who presents for follow-up visit s/p acute necrotizing pancreatitis. He has a history of acute pancreatitis in 4/2019, which was attributed to alcohol abuse. He states that he no longer drinks alcohol. \par More recently he was admitted to [facility] from 8/5/20- 8/10/20 with abdominal pain and was found to have acute pancreatitis and peripancreatic fluid collections on CT 8/5/20. MRCP performed on 8/6/20 showed hemorrhagic/necrotizing pancreatitis; a 2.4 cm pseudocyst in the uncinate process and a 4.7 cm walled off necrosis at the pancreatic tail.  Post discharge he still had recurring abdominal pain and decreased PO intake. He was started on megace for appetite/ weight loss. I referred him for IR guided drainage of fluid collections, however per IR would not be amenable to drainage. I referred patient to advanced GI Dr. Kay for EUS performed on 8/25/20, findings noted-- a 3.6cm walled off pancreatic necrosis near the tail of the pancreas and the spleen, decreased in size compared to prior imaging. The WON was surrounded by splenic vein collateral blood vessels. Given the small size and blood vessels, cystgastrostomy was not performed.\par Patient was readmitted to [facility] on 9/15-9/22/20 with ongoing, progressive abdominal pain. CT and MRCP performed. He was noted to have new fluid collection in the lesser sac and enlarging fluid collection above the spleen, likely 2/2 recurrent necrotizing pancreatitis. MRCP 9/16/20 was concerning for pancreatic ductal disruption. \par \par At prior visit 10/6/20 we discussed plan for surgical intervention with pancreaticojejunostomy body/tail of pancreas and open cholecystectomy. Patient presents for pre-op discussion.  MRCP 10/30/20 notes increased size pancreatic tail WON since prior study and decreased peritoneal parapancreatic collections and unchanged pancreatic head collections. \par

## 2021-07-06 NOTE — ASSESSMENT
[FreeTextEntry1] : s/p acute necrotizing pancreatitis and peripancreatic fluid collections/WON. Suspect gall bladder as the etiology. Also now has disrupted pancreatic duct which can cause recurrent pancreatitis and fluid collections.\par \par Plan:\par -Surgical intervention with pancreaticojejunostomy body/tail of pancreas and open cholecystectomy.

## 2021-07-06 NOTE — REVIEW OF SYSTEMS
[FreeTextEntry8] : see HPI.  No diarrhea or constipation.  [de-identified] : cervical spine issue, awaits spine surgery

## 2021-10-04 ENCOUNTER — APPOINTMENT (OUTPATIENT)
Dept: PAIN MANAGEMENT | Facility: CLINIC | Age: 56
End: 2021-10-04
Payer: COMMERCIAL

## 2021-10-04 VITALS
SYSTOLIC BLOOD PRESSURE: 117 MMHG | HEIGHT: 71 IN | WEIGHT: 172 LBS | HEART RATE: 90 BPM | DIASTOLIC BLOOD PRESSURE: 71 MMHG | BODY MASS INDEX: 24.08 KG/M2

## 2021-10-04 PROCEDURE — 99214 OFFICE O/P EST MOD 30 MIN: CPT

## 2021-10-04 NOTE — ASSESSMENT
[FreeTextEntry1] : Chronic pancreatitis - reviewed CT urogram\par Will add Oxycodone q week for breakthrough.\par Increase gabapentin 300 mgs tid\par Advised of AEs.\par \par Reviewed UDT - Patient took tramadol from back surgeon. Doers not have any more. Patient advised not to take this med if on oxycodone and not to drink ETOH. He agrees.

## 2021-10-04 NOTE — HISTORY OF PRESENT ILLNESS
[FreeTextEntry1] : Since his last visit, patient, had a CT urogram  on August 13, 2021 - revealed multiple well encapsulated pancreatic fluid collections. He notes new pain meds I prescribed failed to provide relief. On Gabapentin 100 mgs tid, no AEs, Off oxycodone, however, pain increased. .\par \par LISA 8/10 \par

## 2021-11-18 ENCOUNTER — APPOINTMENT (OUTPATIENT)
Dept: PAIN MANAGEMENT | Facility: CLINIC | Age: 56
End: 2021-11-18
Payer: COMMERCIAL

## 2021-11-18 ENCOUNTER — APPOINTMENT (OUTPATIENT)
Dept: PAIN MANAGEMENT | Facility: CLINIC | Age: 56
End: 2021-11-18

## 2021-11-18 VITALS
BODY MASS INDEX: 24.22 KG/M2 | DIASTOLIC BLOOD PRESSURE: 73 MMHG | HEIGHT: 71 IN | SYSTOLIC BLOOD PRESSURE: 117 MMHG | HEART RATE: 76 BPM | WEIGHT: 173 LBS

## 2021-11-18 PROCEDURE — 99213 OFFICE O/P EST LOW 20 MIN: CPT

## 2021-11-18 NOTE — ASSESSMENT
[Opioids] : Patient was explained in detail about pain control by using opioids. Patient has signed and fully understands our guidelines for medication and drug screening.  Patient understands the side effects of opioids, including, but not limited to, drug tolerance, dependence, potential for addiction. This class of drugs is habit-forming and CASEY regulated. The sedative effects of opioids can be potentiated by taking alcohol or any sleeping pills, along with opioids. The decision to drive is patient’s responsibility, as opioids can affect his/her driving ability and ability to concentrate. The long-term place is not clear, however, patient understands that once the pain control optimizes, the goal will be to wean off the opioids. All the issues regarding opioid treatment have been addressed satisfactorily.  [FreeTextEntry1] : 54 y/o M with chronic pancreatitis with chronic pain.  \par \par I-Stop reviewed,  reference #: [id number]\par On his last visit with Dr. Kang, discussed that he should not be taking Tramadol given rx for Oxycodone.  He last filled rx for  tramadol 37.5/325 mg BID on 11/8/21 prescribed by Dr. Mason - Ortho [facility].  He claims he has not filled a recent rx for this in quite some time despite Istop report. He reports he has not been taking Oxycodone or Tramadol at this time. \par -UDS to be done today \par - Will not write for Oxycodone today despite request by patient \par Plan to increase gabapentin from 300 mg TID and will titrate this up to 600 mg TID gradually as tolerated. \par  will continue to monitor for signs of toxicity.\par Reminded to continue to avoid alcohol.\par \par I am seeing TREE NORWOOD as incident to service. Dr. Kent is present in the office suite immediately available and able to provide assistance and direction throughout the time the service was performed.\par

## 2022-02-03 ENCOUNTER — APPOINTMENT (OUTPATIENT)
Dept: PAIN MANAGEMENT | Facility: CLINIC | Age: 57
End: 2022-02-03
Payer: COMMERCIAL

## 2022-02-03 VITALS
HEIGHT: 71 IN | BODY MASS INDEX: 24.08 KG/M2 | DIASTOLIC BLOOD PRESSURE: 86 MMHG | SYSTOLIC BLOOD PRESSURE: 140 MMHG | HEART RATE: 86 BPM | WEIGHT: 172 LBS

## 2022-02-03 PROCEDURE — 99213 OFFICE O/P EST LOW 20 MIN: CPT

## 2022-02-03 NOTE — HISTORY OF PRESENT ILLNESS
[FreeTextEntry1] : 55 y/o M with Pmhx hypothyroidism, GERD, CVA, with chronic pancreatitis with chronic pain who presents today for follow up.  Pain is intermittent occurring 2-3x/week on average 7-8/10 but occasionally up to 10/10. Triggered by OJ, coffee, Ice cream   Pain interferes with QOL and functioning, decreased appetite . No new medical issues, denies AE. denies constipation or diarrhea.  He did not increase the gabapentin as directed on the last visit. He is being prescribed Tramadol by Dr. Mason according to  but claims he has not filled since October 2021 which is inconsistent with [facility] . \par \par Current meds:  Gabapentin 300mg TID,  Tramadol 50 mg BID prescribed by Dr. Theo Mason

## 2022-02-03 NOTE — ASSESSMENT
[Opioids] : Patient was explained in detail about pain control by using opioids. Patient has signed and fully understands our guidelines for medication and drug screening.  Patient understands the side effects of opioids, including, but not limited to, drug tolerance, dependence, potential for addiction. This class of drugs is habit-forming and CASEY regulated. The sedative effects of opioids can be potentiated by taking alcohol or any sleeping pills, along with opioids. The decision to drive is patient’s responsibility, as opioids can affect his/her driving ability and ability to concentrate. The long-term place is not clear, however, patient understands that once the pain control optimizes, the goal will be to wean off the opioids. All the issues regarding opioid treatment have been addressed satisfactorily.  [FreeTextEntry1] : 55 y/o M with chronic pancreatitis with chronic pain.  We have not written Oxycodone since October as he is prescribed Tramadol by Dr. Mason. Patient claims that he has not filled Tramadol since October but this is not consistent with [facility] . I-Stop reviewed,  reference #: [id number] \par \par There is clear evidence of aberrant behavior and we will no longer prescribed opioids.  We are willing to continue to manage with non-opioid pain meds . \par -UDS repeated today (urine sample provided at the time of visit was cold)\par Plan to increase gabapentin from 300 mg TID and will titrate this up to 600 mg TID gradually as tolerated. \par Reminded to continue to avoid alcohol.\par \par I am seeing TREE NORWOOD as incident to service. Dr. Kang is present in the office suite immediately available and able to provide assistance and direction throughout the time the service was performed.\par

## 2022-02-10 ENCOUNTER — NON-APPOINTMENT (OUTPATIENT)
Age: 57
End: 2022-02-10

## 2022-02-15 NOTE — ED ADULT NURSE NOTE - TEMPLATE
Problem: Pain:  Goal: Pain level will decrease  Description: Pain level will decrease  2/14/2022 2226 by Narcisa Brady RN  Outcome: Ongoing  2/14/2022 1213 by Madi Olivarez RN  Outcome: Ongoing  Note: Complains of left upper quadrant abdominal pain and medicated with IV dilauidid. Able to reach pain goal of 3. NPO except ice chips  Goal: Control of acute pain  Description: Control of acute pain  2/14/2022 2226 by Narcisa Brady RN  Outcome: Ongoing  2/14/2022 1213 by Madi Olivarez RN  Outcome: Met This Shift  Goal: Control of chronic pain  Description: Control of chronic pain  2/14/2022 2226 by Narcisa Brady RN  Outcome: Ongoing  2/14/2022 1213 by Madi Olivarez RN  Outcome: Met This Shift     Problem: Falls - Risk of:  Goal: Will remain free from falls  Description: Will remain free from falls  2/14/2022 2226 by Narcisa Brady RN  Outcome: Ongoing  2/14/2022 1213 by Madi Olivarez RN  Outcome: Ongoing  Note: Up ad rasheeda in room. Gait steady. No falls this shift. Goal: Absence of physical injury  Description: Absence of physical injury  2/14/2022 2226 by Narcisa Brady RN  Outcome: Ongoing  2/14/2022 1213 by Madi Olivarez RN  Outcome: Met This Shift     Problem: SKIN INTEGRITY  Goal: Skin integrity is maintained or improved  2/14/2022 2226 by Narcisa Brady RN  Outcome: Ongoing  2/14/2022 1213 by Madi Olivarez RN  Outcome: Met This Shift     Problem: DISCHARGE BARRIERS  Goal: Patient's continuum of care needs are met  2/14/2022 2226 by Narcisa Brady RN  Outcome: Ongoing  2/14/2022 1213 by Madi Olivarez RN  Outcome: Ongoing  Note: Plans to return home with  at discharge. Neuro

## 2022-03-09 ENCOUNTER — NON-APPOINTMENT (OUTPATIENT)
Age: 57
End: 2022-03-09

## 2022-03-09 RX ORDER — NABUMETONE 500 MG/1
500 TABLET, FILM COATED ORAL
Qty: 60 | Refills: 0 | Status: DISCONTINUED | COMMUNITY
Start: 2018-09-14 | End: 2022-03-09

## 2022-03-09 RX ORDER — OXYCODONE 5 MG/1
5 TABLET ORAL 3 TIMES DAILY
Qty: 21 | Refills: 0 | Status: DISCONTINUED | COMMUNITY
Start: 2020-09-30 | End: 2022-03-09

## 2022-03-09 RX ORDER — OXYCODONE AND ACETAMINOPHEN 7.5; 325 MG/1; MG/1
7.5-325 TABLET ORAL
Qty: 90 | Refills: 0 | Status: DISCONTINUED | COMMUNITY
Start: 2020-10-14 | End: 2022-03-09

## 2022-03-09 RX ORDER — GABAPENTIN 100 MG/1
100 CAPSULE ORAL
Qty: 90 | Refills: 0 | Status: DISCONTINUED | COMMUNITY
Start: 2021-11-18 | End: 2022-03-09

## 2022-03-09 RX ORDER — GABAPENTIN 300 MG/1
300 CAPSULE ORAL 3 TIMES DAILY
Qty: 180 | Refills: 3 | Status: DISCONTINUED | COMMUNITY
Start: 2021-06-14 | End: 2022-03-09

## 2022-04-01 NOTE — PROGRESS NOTE ADULT - ASSESSMENT
EKG - NSR   Stress test - < from: Nuclear Stress Test-Pharmacologic (Nuclear Stress Test-Pharmacologic .) (03.10.21 @ 10:59) >   There are medium-sized, mild to moderate defects in the  basal to mid inferior, basal septal, and basal  inferolateral walls that are mostly fixed suggestive of  infarct with minimal shane-infarct ischemia.  * There is a small, mild defect in the distal anterior  wall that is fixed suggestive of infarct.  * Post-stress gated wall motion analysis was performed  (LVEF > 70%;LVEDV =87 ml.) revealing mild hypokinesis of  the basal to mid inferior, basal septal, and basal  inferolateral walls and reduced systolic thickening of the  distal anterior wall.    < end of copied text >    a/p     1) Chest pains - positive stress test , will cath tomorrow    2) Pancreatitis - chronic f/u GI consult     3) htn - on amlodipine  
pt with above history p/w shortness of breath on exertion     abnl stress test - cards eval, ischemic w/u as per cards     Pancreatitis- pain control, Gi eval    Hypothyroidism - cont synthroid    Htn - cont home htn meds   
pt with above history p/w shortness of breath on exertion     abnl stress test - cards f/u ,s/p cath - neg    Pancreatitis- pain control, Gi f/u    Hypothyroidism - cont synthroid, abnl TSH - endo f/u    Htn - cont home htn meds   
EKG - NSR   Stress test - < from: Nuclear Stress Test-Pharmacologic (Nuclear Stress Test-Pharmacologic .) (03.10.21 @ 10:59) >   There are medium-sized, mild to moderate defects in the  basal to mid inferior, basal septal, and basal  inferolateral walls that are mostly fixed suggestive of  infarct with minimal shane-infarct ischemia.  * There is a small, mild defect in the distal anterior  wall that is fixed suggestive of infarct.  * Post-stress gated wall motion analysis was performed  (LVEF > 70%;LVEDV =87 ml.) revealing mild hypokinesis of  the basal to mid inferior, basal septal, and basal  inferolateral walls and reduced systolic thickening of the  distal anterior wall.    < end of copied text >    a/p     1) Chest pains - positive stress test , cath shows normal coronaries    2) Pancreatitis - chronic f/u GI consult     3) htn - on amlodipine  
PAST SURGICAL HISTORY:  H/O eye surgery

## 2022-05-04 ENCOUNTER — NON-APPOINTMENT (OUTPATIENT)
Age: 57
End: 2022-05-04

## 2022-05-05 ENCOUNTER — APPOINTMENT (OUTPATIENT)
Dept: PAIN MANAGEMENT | Facility: CLINIC | Age: 57
End: 2022-05-05
Payer: COMMERCIAL

## 2022-05-05 VITALS
DIASTOLIC BLOOD PRESSURE: 80 MMHG | BODY MASS INDEX: 23.38 KG/M2 | HEIGHT: 71 IN | SYSTOLIC BLOOD PRESSURE: 120 MMHG | HEART RATE: 93 BPM | WEIGHT: 167 LBS

## 2022-05-05 PROCEDURE — 99213 OFFICE O/P EST LOW 20 MIN: CPT

## 2022-05-05 NOTE — HISTORY OF PRESENT ILLNESS
[FreeTextEntry1] : 57 y/o M with Pmhx hypothyroidism, GERD, CVA, with chronic pancreatitis with chronic pain who presents today for follow up.  His pain has been intermittent since 2019 and feels it was been worse over the last few months.  He recently saw GI s/p Endoscopy x 2 and underwent procedure yesterday ? drainage.  Pain is intermittent occurring 2-3x/week on average 7-8/10 but occasionally up to 10/10. Triggered by OJ, coffee, Ice cream   Pain interferes with QOL and functioning, decreased appetite . No new medical issues, denies AE\par \par UGI sx with decreased appetite and not eating as much. \par \par Current meds:  Gabapentin 300mg TID,  Tramadol 50 mg BID prescribed by Dr. Theo Mason

## 2022-05-05 NOTE — ASSESSMENT
[Opioids] : Patient was explained in detail about pain control by using opioids. Patient has signed and fully understands our guidelines for medication and drug screening.  Patient understands the side effects of opioids, including, but not limited to, drug tolerance, dependence, potential for addiction. This class of drugs is habit-forming and CASEY regulated. The sedative effects of opioids can be potentiated by taking alcohol or any sleeping pills, along with opioids. The decision to drive is patient’s responsibility, as opioids can affect his/her driving ability and ability to concentrate. The long-term place is not clear, however, patient understands that once the pain control optimizes, the goal will be to wean off the opioids. All the issues regarding opioid treatment have been addressed satisfactorily.  [FreeTextEntry1] : 55 y/o M with chronic pancreatitis with chronic pain.  Aberrant behavior on prior visits and Oxycodone  not written October as he is prescribed Tramadol by Dr. Mason. Patient claims that he has not filled \par \par GI interventional - Dr. Lenka Conroy - fu planned in 2 weeks\par GI- GERD Dr. Person. \par -consider adding Nortriptyline 10 mg q hs, w/u GI input . \par Given prior aberrant behavior and we will no longer prescribed opioids.  We are willing to continue to manage with non-opioid pain meds \par I-Stop reviewed,  reference #: [id number]\par No signs of aberrant behavior and will continue to monitor for signs of toxicity.\par Reminded to continue to avoid alcohol.\par -UDS repeated on last visit (urine sample provided at the time of visit was cold)\par \par I am seeing TREE NORWOOD as incident to service. Dr. Kang is present in the office suite immediately available and able to provide assistance and direction throughout the time the service was performed.\par

## 2022-05-09 ENCOUNTER — NON-APPOINTMENT (OUTPATIENT)
Age: 57
End: 2022-05-09

## 2022-05-10 ENCOUNTER — NON-APPOINTMENT (OUTPATIENT)
Age: 57
End: 2022-05-10

## 2022-05-11 ENCOUNTER — NON-APPOINTMENT (OUTPATIENT)
Age: 57
End: 2022-05-11

## 2022-05-17 ENCOUNTER — INPATIENT (INPATIENT)
Facility: HOSPITAL | Age: 57
LOS: 5 days | Discharge: HOME | DRG: 439 | End: 2022-05-23
Attending: INTERNAL MEDICINE | Admitting: INTERNAL MEDICINE
Payer: COMMERCIAL

## 2022-05-17 VITALS
SYSTOLIC BLOOD PRESSURE: 118 MMHG | WEIGHT: 166.89 LBS | HEIGHT: 71 IN | RESPIRATION RATE: 16 BRPM | OXYGEN SATURATION: 99 % | DIASTOLIC BLOOD PRESSURE: 76 MMHG | TEMPERATURE: 99 F | HEART RATE: 98 BPM

## 2022-05-17 DIAGNOSIS — K85.90 ACUTE PANCREATITIS WITHOUT NECROSIS OR INFECTION, UNSPECIFIED: ICD-10-CM

## 2022-05-17 DIAGNOSIS — K29.70 GASTRITIS, UNSPECIFIED, WITHOUT BLEEDING: ICD-10-CM

## 2022-05-17 DIAGNOSIS — K86.3 PSEUDOCYST OF PANCREAS: ICD-10-CM

## 2022-05-17 DIAGNOSIS — E03.9 HYPOTHYROIDISM, UNSPECIFIED: ICD-10-CM

## 2022-05-17 DIAGNOSIS — Z98.890 OTHER SPECIFIED POSTPROCEDURAL STATES: Chronic | ICD-10-CM

## 2022-05-17 DIAGNOSIS — I10 ESSENTIAL (PRIMARY) HYPERTENSION: ICD-10-CM

## 2022-05-17 LAB
ALBUMIN SERPL ELPH-MCNC: 4 G/DL — SIGNIFICANT CHANGE UP (ref 3.3–5)
ALP SERPL-CCNC: 75 U/L — SIGNIFICANT CHANGE UP (ref 40–120)
ALT FLD-CCNC: 5 U/L — LOW (ref 10–45)
ANION GAP SERPL CALC-SCNC: 11 MMOL/L — SIGNIFICANT CHANGE UP (ref 5–17)
AST SERPL-CCNC: 8 U/L — LOW (ref 10–40)
BASOPHILS # BLD AUTO: 0.04 K/UL — SIGNIFICANT CHANGE UP (ref 0–0.2)
BASOPHILS NFR BLD AUTO: 0.5 % — SIGNIFICANT CHANGE UP (ref 0–2)
BILIRUB SERPL-MCNC: 0.4 MG/DL — SIGNIFICANT CHANGE UP (ref 0.2–1.2)
BUN SERPL-MCNC: 11 MG/DL — SIGNIFICANT CHANGE UP (ref 7–23)
CALCIUM SERPL-MCNC: 9.2 MG/DL — SIGNIFICANT CHANGE UP (ref 8.4–10.5)
CHLORIDE SERPL-SCNC: 102 MMOL/L — SIGNIFICANT CHANGE UP (ref 96–108)
CO2 SERPL-SCNC: 26 MMOL/L — SIGNIFICANT CHANGE UP (ref 22–31)
CREAT SERPL-MCNC: 0.77 MG/DL — SIGNIFICANT CHANGE UP (ref 0.5–1.3)
EGFR: 105 ML/MIN/1.73M2 — SIGNIFICANT CHANGE UP
EOSINOPHIL # BLD AUTO: 0.72 K/UL — HIGH (ref 0–0.5)
EOSINOPHIL NFR BLD AUTO: 9.8 % — HIGH (ref 0–6)
GLUCOSE SERPL-MCNC: 103 MG/DL — HIGH (ref 70–99)
HCT VFR BLD CALC: 29.7 % — LOW (ref 39–50)
HGB BLD-MCNC: 9.1 G/DL — LOW (ref 13–17)
IMM GRANULOCYTES NFR BLD AUTO: 0.3 % — SIGNIFICANT CHANGE UP (ref 0–1.5)
LIDOCAIN IGE QN: 79 U/L — HIGH (ref 7–60)
LYMPHOCYTES # BLD AUTO: 1.53 K/UL — SIGNIFICANT CHANGE UP (ref 1–3.3)
LYMPHOCYTES # BLD AUTO: 20.9 % — SIGNIFICANT CHANGE UP (ref 13–44)
MCHC RBC-ENTMCNC: 28.1 PG — SIGNIFICANT CHANGE UP (ref 27–34)
MCHC RBC-ENTMCNC: 30.6 GM/DL — LOW (ref 32–36)
MCV RBC AUTO: 91.7 FL — SIGNIFICANT CHANGE UP (ref 80–100)
MONOCYTES # BLD AUTO: 0.53 K/UL — SIGNIFICANT CHANGE UP (ref 0–0.9)
MONOCYTES NFR BLD AUTO: 7.2 % — SIGNIFICANT CHANGE UP (ref 2–14)
NEUTROPHILS # BLD AUTO: 4.48 K/UL — SIGNIFICANT CHANGE UP (ref 1.8–7.4)
NEUTROPHILS NFR BLD AUTO: 61.3 % — SIGNIFICANT CHANGE UP (ref 43–77)
NRBC # BLD: 0 /100 WBCS — SIGNIFICANT CHANGE UP (ref 0–0)
PLATELET # BLD AUTO: 329 K/UL — SIGNIFICANT CHANGE UP (ref 150–400)
POTASSIUM SERPL-MCNC: 4.1 MMOL/L — SIGNIFICANT CHANGE UP (ref 3.5–5.3)
POTASSIUM SERPL-SCNC: 4.1 MMOL/L — SIGNIFICANT CHANGE UP (ref 3.5–5.3)
PROT SERPL-MCNC: 7 G/DL — SIGNIFICANT CHANGE UP (ref 6–8.3)
RBC # BLD: 3.24 M/UL — LOW (ref 4.2–5.8)
RBC # FLD: 16.1 % — HIGH (ref 10.3–14.5)
SARS-COV-2 RNA SPEC QL NAA+PROBE: SIGNIFICANT CHANGE UP
SODIUM SERPL-SCNC: 139 MMOL/L — SIGNIFICANT CHANGE UP (ref 135–145)
TROPONIN T, HIGH SENSITIVITY RESULT: 6 NG/L — SIGNIFICANT CHANGE UP (ref 0–51)
TROPONIN T, HIGH SENSITIVITY RESULT: 7 NG/L — SIGNIFICANT CHANGE UP (ref 0–51)
WBC # BLD: 7.32 K/UL — SIGNIFICANT CHANGE UP (ref 3.8–10.5)
WBC # FLD AUTO: 7.32 K/UL — SIGNIFICANT CHANGE UP (ref 3.8–10.5)

## 2022-05-17 PROCEDURE — 99285 EMERGENCY DEPT VISIT HI MDM: CPT

## 2022-05-17 PROCEDURE — 93010 ELECTROCARDIOGRAM REPORT: CPT

## 2022-05-17 PROCEDURE — 74177 CT ABD & PELVIS W/CONTRAST: CPT | Mod: 26,MA

## 2022-05-17 PROCEDURE — 99223 1ST HOSP IP/OBS HIGH 75: CPT

## 2022-05-17 PROCEDURE — 71046 X-RAY EXAM CHEST 2 VIEWS: CPT | Mod: 26

## 2022-05-17 RX ORDER — ONDANSETRON 8 MG/1
4 TABLET, FILM COATED ORAL ONCE
Refills: 0 | Status: COMPLETED | OUTPATIENT
Start: 2022-05-17 | End: 2022-05-17

## 2022-05-17 RX ORDER — MORPHINE SULFATE 50 MG/1
4 CAPSULE, EXTENDED RELEASE ORAL ONCE
Refills: 0 | Status: DISCONTINUED | OUTPATIENT
Start: 2022-05-17 | End: 2022-05-17

## 2022-05-17 RX ORDER — FAMOTIDINE 10 MG/ML
20 INJECTION INTRAVENOUS ONCE
Refills: 0 | Status: COMPLETED | OUTPATIENT
Start: 2022-05-17 | End: 2022-05-17

## 2022-05-17 RX ORDER — PANTOPRAZOLE SODIUM 20 MG/1
40 TABLET, DELAYED RELEASE ORAL
Refills: 0 | Status: DISCONTINUED | OUTPATIENT
Start: 2022-05-17 | End: 2022-05-20

## 2022-05-17 RX ORDER — PANTOPRAZOLE SODIUM 20 MG/1
1 TABLET, DELAYED RELEASE ORAL
Qty: 0 | Refills: 0 | DISCHARGE

## 2022-05-17 RX ORDER — ACETAMINOPHEN 500 MG
650 TABLET ORAL EVERY 6 HOURS
Refills: 0 | Status: DISCONTINUED | OUTPATIENT
Start: 2022-05-17 | End: 2022-05-23

## 2022-05-17 RX ORDER — AMLODIPINE BESYLATE 2.5 MG/1
10 TABLET ORAL DAILY
Refills: 0 | Status: DISCONTINUED | OUTPATIENT
Start: 2022-05-17 | End: 2022-05-23

## 2022-05-17 RX ORDER — ENOXAPARIN SODIUM 100 MG/ML
40 INJECTION SUBCUTANEOUS EVERY 24 HOURS
Refills: 0 | Status: DISCONTINUED | OUTPATIENT
Start: 2022-05-17 | End: 2022-05-23

## 2022-05-17 RX ORDER — SODIUM CHLORIDE 9 MG/ML
1000 INJECTION, SOLUTION INTRAVENOUS
Refills: 0 | Status: DISCONTINUED | OUTPATIENT
Start: 2022-05-17 | End: 2022-05-23

## 2022-05-17 RX ORDER — MORPHINE SULFATE 50 MG/1
2 CAPSULE, EXTENDED RELEASE ORAL EVERY 4 HOURS
Refills: 0 | Status: DISCONTINUED | OUTPATIENT
Start: 2022-05-17 | End: 2022-05-20

## 2022-05-17 RX ORDER — LEVOTHYROXINE SODIUM 125 MCG
150 TABLET ORAL DAILY
Refills: 0 | Status: DISCONTINUED | OUTPATIENT
Start: 2022-05-17 | End: 2022-05-23

## 2022-05-17 RX ADMIN — MORPHINE SULFATE 2 MILLIGRAM(S): 50 CAPSULE, EXTENDED RELEASE ORAL at 21:10

## 2022-05-17 RX ADMIN — Medication 650 MILLIGRAM(S): at 23:41

## 2022-05-17 RX ADMIN — MORPHINE SULFATE 4 MILLIGRAM(S): 50 CAPSULE, EXTENDED RELEASE ORAL at 12:13

## 2022-05-17 RX ADMIN — SODIUM CHLORIDE 200 MILLILITER(S): 9 INJECTION, SOLUTION INTRAVENOUS at 21:17

## 2022-05-17 RX ADMIN — ONDANSETRON 4 MILLIGRAM(S): 8 TABLET, FILM COATED ORAL at 12:13

## 2022-05-17 RX ADMIN — SODIUM CHLORIDE 200 MILLILITER(S): 9 INJECTION, SOLUTION INTRAVENOUS at 19:58

## 2022-05-17 RX ADMIN — MORPHINE SULFATE 2 MILLIGRAM(S): 50 CAPSULE, EXTENDED RELEASE ORAL at 21:25

## 2022-05-17 RX ADMIN — ENOXAPARIN SODIUM 40 MILLIGRAM(S): 100 INJECTION SUBCUTANEOUS at 21:15

## 2022-05-17 RX ADMIN — FAMOTIDINE 20 MILLIGRAM(S): 10 INJECTION INTRAVENOUS at 12:13

## 2022-05-17 NOTE — H&P ADULT - PROBLEM SELECTOR PLAN 1
-Aggressive IV hydration with LR at 200 cc/hr  -NPO for now  -Pain control with Tylenol & Morphine PRN  -Monitor electrolytes

## 2022-05-17 NOTE — H&P ADULT - NSHPPHYSICALEXAM_GEN_ALL_CORE
T(C): 37.1 (05-17-22 @ 16:00), Max: 37.4 (05-17-22 @ 11:06)  HR: 75 (05-17-22 @ 16:00) (75 - 98)  BP: 125/83 (05-17-22 @ 16:00) (118/76 - 128/79)  RR: 16 (05-17-22 @ 16:00) (16 - 18)  SpO2: 98% (05-17-22 @ 16:00) (98% - 99%)    GEN: (fe)male in NAD, appears comfortable, no diaphoresis  EYES: No scleral injection, PERRL, EOMI  ENTM: neck supple & symmetric without tracheal deviation, moist membranes, no gross hearing impairment, thyroid gland not enlarged  CV: +S1/S2, no m/r/g, no abdominal bruit, no LE edema  RESP: breathing comfortably, no respiratory accessory muscle use, CTAB, no w/r/r  GI: normoactive BS, soft, NTND, no rebounding/guarding, no palpable masses  LYMPHATICS: no LAD or tenderness to palpation  NEURO: AOx3, no focal deficits, CNII-XII grossly intact  PSYCH: No SI/HI/AVH, appropriate affect, appropriate insight/judgment   SKIN: no petechiae, ecchymosis or maculopapular rash noted T(C): 37.1 (05-17-22 @ 16:00), Max: 37.4 (05-17-22 @ 11:06)  HR: 75 (05-17-22 @ 16:00) (75 - 98)  BP: 125/83 (05-17-22 @ 16:00) (118/76 - 128/79)  RR: 16 (05-17-22 @ 16:00) (16 - 18)  SpO2: 98% (05-17-22 @ 16:00) (98% - 99%)    GEN: male in NAD, appears comfortable, no diaphoresis  EYES: No scleral injection, PERRL, EOMI  ENTM: neck supple & symmetric without tracheal deviation, moist membranes, no gross hearing impairment, thyroid gland not enlarged  CV: +S1/S2, no m/r/g, no abdominal bruit, no LE edema  RESP: breathing comfortably, no respiratory accessory muscle use, CTAB, no w/r/r  GI: normoactive BS, soft, ND, no rebounding/guarding, no palpable masses, tender to deep palpation in epigastric region  LYMPHATICS: no LAD or tenderness to palpation  NEURO: AOx3, no focal deficits, CNII-XII grossly intact  PSYCH: No SI/HI/AVH, appropriate affect, appropriate insight/judgment   SKIN: no petechiae, ecchymosis or maculopapular rash noted

## 2022-05-17 NOTE — H&P ADULT - PROBLEM SELECTOR PLAN 2
-Would consult GI given recent EUS & drainage of cysts  -Needs outpatient surgical follow up for definitive treatment

## 2022-05-17 NOTE — ED PROVIDER NOTE - PHYSICAL EXAMINATION
gen: Appears uncomfortable  Mentation: AAO x 3  psych: mood appropriate  HEENT: airway patent, conjunctivae clear bilaterally  Cardio: RRR, no m/r/g  Resp: normal BS b/l  GI: epigastric region ttp, soft/nondistended  : no CVA tenderness  Neuro: sensation and motor function grossly intact  Skin: No evidence of rash  MSK: normal movement of all extremities  Lymph/Vasc: no LE edema

## 2022-05-17 NOTE — ED PROCEDURE NOTE - PROCEDURE ADDITIONAL DETAILS
Emergency Department Focused Ultrasound performed at patient's bedside for educational purposes. The study will have a follow up study performed or was performed in the direct supervision of an ultrasound trained attending. Please follow up CT abd/pelvis.

## 2022-05-17 NOTE — H&P ADULT - HISTORY OF PRESENT ILLNESS
56 year old male with a PMHx of GERD, HTN, s/p pancreatic pseudocyst drainage 5/2/22 presenting with epigastric abdominal pain. Pain began after his procedure on 5/2/22. Since then he has had increasing pain. Pain is worse with eating. Pain is rated 8/10, non-radiating, constant. Patient has relief of pain with vomiting. Denies fevers, cp, sob, diarrhea.    Persistent peripancreatic inflammatory change, decreased from prior CT   3/10/2021.    New and increased pancreatic collections/pseudocysts and new small   gastric intramural collection.    Will likely need necrosectomy and cystgastrostomy at some point, likely as an outpatient    Left upper quadrant collection is decreased and left subphrenic   collections are resolved.     56M with PMHx of HTN, chronic pancreatitis (with result pancreatic pseudocysts), and hypothyroidism presenting with acute-on-chronic epigastric pain associated with decrease PO intake. Patient has a known history of pancreatic pseudocysts and was last admitted here in March 2021. At that time was seen by GI and recommended for eventual necrosectomy and cystgastrostomy. Patient followed up outpatient with our surgeon and recommended for surgical intervention, but then established care in [place]. He follows with Raza Blood at [place] who referred him to Rafiq Escobar at [facility] who performed an EUS and drained one of his cysts on 5/2. Pathology not known to patient. Since then has been having severe pain in epigastric region with no radiation. No known alleviating factors, but aggravated by food and movement. When pain happens he feels like burping. He states feels similar to his pancreatic pain. He was pending surgical evaluation by Anderson Carson. In the ED had CT A&P showing persistent peripancreatic inflammatory change with pancreatic pseudocysts. In the ED was given Pepcid, Morphine, and Zofran.

## 2022-05-17 NOTE — H&P ADULT - ASSESSMENT
56M with PMHx of HTN, chronic pancreatitis (with result pancreatic pseudocysts), and hypothyroidism presenting with acute-on-chronic epigastric pain associated with decrease PO intake. Occurred after he had outpatient EUS and pancreatic pseudocyst drainage on 5/2.  CT A&P showing persistent peripancreatic inflammatory change with pancreatic pseudocysts. Likely acute-on-chronic pancreatitis.

## 2022-05-17 NOTE — ED PROVIDER NOTE - CLINICAL SUMMARY MEDICAL DECISION MAKING FREE TEXT BOX
eli - chronic gastritis /esophagitis over months with progression to pain with swallowing both liquid and solids --  recent endosacopic us with dx of chronic pancreatitis and pancreatic pseudocyst which was drained -- - abd ttp luq - concern for pancreatiti s- but with cp screening ekg and will send biomarkers-  needs iv fluids antiemetics and analgeisa

## 2022-05-17 NOTE — H&P ADULT - NSHPREVIEWOFSYSTEMS_GEN_ALL_CORE
GEN: no night sweats or change in appetite  EYES: no changes in vision or diplopia   ENT: no epistaxis, sinus pain, gingival bleeding, odynophagia or dysphagia  CV: no CP, PND or palpitations  RESP: no cough, wheezing, or hemoptysis  GI: no hematemesis, hematochezia, or melena  : no dysuria, polyuria, or hematuria  MSK: no arthralgias or joint swelling   NEURO: no gross sensory changes, numbness, focal deficits  PSYCH: no depression or changes in concentration  HEME/ONC: no purpura, petechiae or night sweats  SKIN: no pruritus, hair loss or skin lesions  ALL: no photosensitivity, no complaints of anaphylaxis (SOB, throat swelling) GEN: no night sweats or change in appetite  EYES: no changes in vision or diplopia   ENT: no epistaxis, sinus pain, gingival bleeding, odynophagia or dysphagia  CV: no CP, PND or palpitations  RESP: no cough, wheezing, or hemoptysis  GI: no hematemesis, hematochezia, or melena; +epigastric pain  : no dysuria, polyuria, or hematuria  MSK: no arthralgias or joint swelling   NEURO: no gross sensory changes, numbness, focal deficits  PSYCH: no depression or changes in concentration  HEME/ONC: no purpura, petechiae or night sweats  SKIN: no pruritus, hair loss or skin lesions  ALL: no photosensitivity, no complaints of anaphylaxis (SOB, throat swelling)

## 2022-05-17 NOTE — ED PROVIDER NOTE - PROGRESS NOTE DETAILS
Resident Gisela: pt with recrudescence of pancreatic pseudocyst. Case endorsed to Hospitalist (MD Maulik).

## 2022-05-17 NOTE — CHART NOTE - NSCHARTNOTEFT_GEN_A_CORE
Reference #: [id number]    Rx Written	Rx Dispensed	Drug	Quantity	Days Supply	Prescriber Name	Prescriber Ruth #	Payment Method	Dispenser  01/04/2022	01/05/2022	tramadol-acetaminophen 37.5-325 mg tab	60	30	Theo Mason MD	[id number]	Insurance	[facility][id number]  12/20/2021	12/21/2021	tramadol-acetaminophen 37.5-325 mg tab	30	15	Theo Mason MD	[id number]	[facility] #[id number]  11/08/2021	11/08/2021	tramadol-acetaminophen 37.5-325 mg tab	60	30	Theo Mason MD	[id number]	[facility] #[id number]  10/04/2021	10/04/2021	oxycodone hcl 5 mg tablet	21	7	Alek Yan MD	[id number]	[id number]  07/20/2021	07/20/2021	oxycodone hcl 5 mg tablet	21	7	Alek Yan MD	[id number]	[facility] #[id number]

## 2022-05-17 NOTE — ED PROVIDER NOTE - OBJECTIVE STATEMENT
56 year old male with a PMHx of GERD, HTN, s/p pancreatic pseudocyst drainage 5/2/22 presenting with epigastric abdominal pain. Pain began after his procedure on 5/2/22. Since then he has had increasing pain. Pain is worse with eating. Pain is rated 8/10, non-radiating, constant. Patient has relief of pain with vomiting. Denies fevers, cp, sob, diarrhea.

## 2022-05-18 LAB
ALBUMIN SERPL ELPH-MCNC: 3.7 G/DL — SIGNIFICANT CHANGE UP (ref 3.3–5)
ALP SERPL-CCNC: 67 U/L — SIGNIFICANT CHANGE UP (ref 40–120)
ALT FLD-CCNC: <5 U/L — LOW (ref 10–45)
ANION GAP SERPL CALC-SCNC: 15 MMOL/L — SIGNIFICANT CHANGE UP (ref 5–17)
AST SERPL-CCNC: 9 U/L — LOW (ref 10–40)
BILIRUB SERPL-MCNC: 0.4 MG/DL — SIGNIFICANT CHANGE UP (ref 0.2–1.2)
BUN SERPL-MCNC: 11 MG/DL — SIGNIFICANT CHANGE UP (ref 7–23)
CALCIUM SERPL-MCNC: 9 MG/DL — SIGNIFICANT CHANGE UP (ref 8.4–10.5)
CHLORIDE SERPL-SCNC: 101 MMOL/L — SIGNIFICANT CHANGE UP (ref 96–108)
CO2 SERPL-SCNC: 24 MMOL/L — SIGNIFICANT CHANGE UP (ref 22–31)
CREAT SERPL-MCNC: 0.76 MG/DL — SIGNIFICANT CHANGE UP (ref 0.5–1.3)
EGFR: 105 ML/MIN/1.73M2 — SIGNIFICANT CHANGE UP
GLUCOSE SERPL-MCNC: 65 MG/DL — LOW (ref 70–99)
HCT VFR BLD CALC: 28.4 % — LOW (ref 39–50)
HGB BLD-MCNC: 8.7 G/DL — LOW (ref 13–17)
MAGNESIUM SERPL-MCNC: 1.9 MG/DL — SIGNIFICANT CHANGE UP (ref 1.6–2.6)
MCHC RBC-ENTMCNC: 28.2 PG — SIGNIFICANT CHANGE UP (ref 27–34)
MCHC RBC-ENTMCNC: 30.6 GM/DL — LOW (ref 32–36)
MCV RBC AUTO: 92.2 FL — SIGNIFICANT CHANGE UP (ref 80–100)
NRBC # BLD: 0 /100 WBCS — SIGNIFICANT CHANGE UP (ref 0–0)
PHOSPHATE SERPL-MCNC: 3 MG/DL — SIGNIFICANT CHANGE UP (ref 2.5–4.5)
PLATELET # BLD AUTO: 318 K/UL — SIGNIFICANT CHANGE UP (ref 150–400)
POTASSIUM SERPL-MCNC: 4.2 MMOL/L — SIGNIFICANT CHANGE UP (ref 3.5–5.3)
POTASSIUM SERPL-SCNC: 4.2 MMOL/L — SIGNIFICANT CHANGE UP (ref 3.5–5.3)
PROT SERPL-MCNC: 6.5 G/DL — SIGNIFICANT CHANGE UP (ref 6–8.3)
RBC # BLD: 3.08 M/UL — LOW (ref 4.2–5.8)
RBC # FLD: 16.2 % — HIGH (ref 10.3–14.5)
SODIUM SERPL-SCNC: 140 MMOL/L — SIGNIFICANT CHANGE UP (ref 135–145)
WBC # BLD: 6.34 K/UL — SIGNIFICANT CHANGE UP (ref 3.8–10.5)
WBC # FLD AUTO: 6.34 K/UL — SIGNIFICANT CHANGE UP (ref 3.8–10.5)

## 2022-05-18 RX ORDER — LANOLIN ALCOHOL/MO/W.PET/CERES
3 CREAM (GRAM) TOPICAL ONCE
Refills: 0 | Status: COMPLETED | OUTPATIENT
Start: 2022-05-18 | End: 2022-05-18

## 2022-05-18 RX ORDER — ONDANSETRON 8 MG/1
4 TABLET, FILM COATED ORAL ONCE
Refills: 0 | Status: COMPLETED | OUTPATIENT
Start: 2022-05-18 | End: 2022-05-18

## 2022-05-18 RX ADMIN — MORPHINE SULFATE 2 MILLIGRAM(S): 50 CAPSULE, EXTENDED RELEASE ORAL at 01:22

## 2022-05-18 RX ADMIN — SODIUM CHLORIDE 200 MILLILITER(S): 9 INJECTION, SOLUTION INTRAVENOUS at 07:09

## 2022-05-18 RX ADMIN — MORPHINE SULFATE 2 MILLIGRAM(S): 50 CAPSULE, EXTENDED RELEASE ORAL at 05:45

## 2022-05-18 RX ADMIN — ENOXAPARIN SODIUM 40 MILLIGRAM(S): 100 INJECTION SUBCUTANEOUS at 21:21

## 2022-05-18 RX ADMIN — SODIUM CHLORIDE 200 MILLILITER(S): 9 INJECTION, SOLUTION INTRAVENOUS at 10:33

## 2022-05-18 RX ADMIN — MORPHINE SULFATE 2 MILLIGRAM(S): 50 CAPSULE, EXTENDED RELEASE ORAL at 01:45

## 2022-05-18 RX ADMIN — Medication 150 MICROGRAM(S): at 05:53

## 2022-05-18 RX ADMIN — AMLODIPINE BESYLATE 10 MILLIGRAM(S): 2.5 TABLET ORAL at 05:51

## 2022-05-18 RX ADMIN — MORPHINE SULFATE 2 MILLIGRAM(S): 50 CAPSULE, EXTENDED RELEASE ORAL at 06:00

## 2022-05-18 RX ADMIN — MORPHINE SULFATE 2 MILLIGRAM(S): 50 CAPSULE, EXTENDED RELEASE ORAL at 23:54

## 2022-05-18 RX ADMIN — Medication 650 MILLIGRAM(S): at 18:12

## 2022-05-18 RX ADMIN — Medication 3 MILLIGRAM(S): at 01:18

## 2022-05-18 RX ADMIN — SODIUM CHLORIDE 200 MILLILITER(S): 9 INJECTION, SOLUTION INTRAVENOUS at 21:20

## 2022-05-18 RX ADMIN — MORPHINE SULFATE 2 MILLIGRAM(S): 50 CAPSULE, EXTENDED RELEASE ORAL at 10:33

## 2022-05-18 RX ADMIN — Medication 650 MILLIGRAM(S): at 18:30

## 2022-05-18 RX ADMIN — MORPHINE SULFATE 2 MILLIGRAM(S): 50 CAPSULE, EXTENDED RELEASE ORAL at 16:41

## 2022-05-18 RX ADMIN — PANTOPRAZOLE SODIUM 40 MILLIGRAM(S): 20 TABLET, DELAYED RELEASE ORAL at 10:33

## 2022-05-18 RX ADMIN — MORPHINE SULFATE 2 MILLIGRAM(S): 50 CAPSULE, EXTENDED RELEASE ORAL at 20:11

## 2022-05-18 RX ADMIN — Medication 650 MILLIGRAM(S): at 01:45

## 2022-05-18 RX ADMIN — MORPHINE SULFATE 2 MILLIGRAM(S): 50 CAPSULE, EXTENDED RELEASE ORAL at 19:41

## 2022-05-18 RX ADMIN — ONDANSETRON 4 MILLIGRAM(S): 8 TABLET, FILM COATED ORAL at 15:55

## 2022-05-18 RX ADMIN — MORPHINE SULFATE 2 MILLIGRAM(S): 50 CAPSULE, EXTENDED RELEASE ORAL at 10:48

## 2022-05-18 RX ADMIN — MORPHINE SULFATE 2 MILLIGRAM(S): 50 CAPSULE, EXTENDED RELEASE ORAL at 14:55

## 2022-05-18 NOTE — PATIENT PROFILE ADULT - NSPROPTRIGHTSUPPORTPERSON_GEN_A_NUR
Anticoagulation Note - Preoperative Assessment Center (PAC) Pharmacist     Patient was interviewed on July 8, 2021 as a part of PAC clinic appointment. The purpose of this note is to document the perioperative anticoagulation plan outlined by the providers caring for Carlos Manuel Meeks.     Current Regimen  Anticoagulation Regimen as of July 8, 2021: warfarin 2.5 mg on Wednesdays and 5 mg on all other days of the week. Takes in the evening.   Indication: LVAD, parox Afib  Prescriber:  Dr. Barnett  Expected Duration of therapy: indefinite  Current medications that may interact with this include: escitalopram, amiodarone, biktarvy,   INR Goal: 2-3  Creatinine   Date Value Ref Range Status   06/28/2021 1.03 0.66 - 1.25 mg/dL Final       Perioperative plan  Carlos Manuel Meeks is scheduled for ENDOSCOPIC ULTRASOUND, ESOPHAGOSCOPY / UPPER GASTROINTESTINAL TRACT (GI) on  7/28/21with Dr. Oconnor and for Right Heart Cath on 7/21/21  with Dr. Krause.  No need to hold coumadin for RHC on 7/21.  Per Epic messaging Dr. Oconnor needs INR <1.5 for upper GI procedure as they will be sampling the gastric sub epithelial mass.  The perioperative anticoagulation plan outlined by Dr. Barnett is recheck INR next week (planned for  7/21 and based on INR will hold 4-5 days (per A/C clinic to get INR <1.5) prior to surgery with no bridging.  Patient will receive final instructions from his coumadin clinic once INR is drawn next week.     Resumption of anticoagulation after procedure will be based on surgery team assessment of bleeding risks and complications.  This plan may require re-assessment and modification by his primary team in the perioperative setting depending on patients clinical situation.        Niko Belcher [facility]  July 8, 2021  9:06 AM      Addendum 1 (July 8, 2021):  Patient OK to stay on his aspirin 81 mg daily perioperatively per Epic message with Dr. Oconnor.       Addendum 2 (July 15, 2021):  Procedure  date/location changed to 7/28 at  GI - dates above updated as well. Patient to get final instruction from coumadin clinic at 7/21 INR appointment.      yes

## 2022-05-18 NOTE — CONSULT NOTE ADULT - SUBJECTIVE AND OBJECTIVE BOX
GENERAL SURGERY CONSULT NOTE    Patient is a 56y old  Male who presents with a chief complaint of Epigastric Pain (18 May 2022 15:51)    HPI:  56M with PMHx of HTN, chronic pancreatitis (with result pancreatic pseudocysts), and hypothyroidism presenting with acute-on-chronic epigastric pain associated with decrease PO intake. Patient has a known history of pancreatic pseudocysts and was last admitted here in March 2021. At that time was seen by GI and recommended for eventual necrosectomy and cystgastrostomy. Patient followed up outpatient with surgeon and recommended for surgical intervention, but then established care in [place]. He follows with Raza Blood at [place] who referred him to surgeon Dr. Anderson Workman and GI Dr. Rafiq Escobar at [facility] who performed an EUS and drained one of his cysts on 5/2. Pathology not known to patient. Since then has been having severe pain in epigastric region with no radiation. No known alleviating factors, but aggravated by food and movement. When pain happens he feels like burping. He states feels similar to his pancreatic pain. He was pending surgical evaluation by Anderson Carson. In the ED had CT A&P showing persistent peripancreatic inflammatory change with pancreatic pseudocysts. In the ED was given Pepcid, Morphine, and Zofran.    10-points review of system performed with pertinent negative and positive findings documented in the HPI     PAST MEDICAL & SURGICAL HISTORY:  Hypothyroid    Hernia    Hypertension    Pancreatitis    History of back surgery    FAMILY HISTORY:  No pertinent family history in first degree relatives    : Family history not pertinent as reviewed with the patient and family    SOCIAL HISTORY: No pertinent social history    MEDICATIONS  (STANDING):  amLODIPine   Tablet 10 milliGRAM(s) Oral daily  enoxaparin Injectable 40 milliGRAM(s) SubCutaneous every 24 hours  lactated ringers. 1000 milliLiter(s) (200 mL/Hr) IV Continuous <Continuous>  levothyroxine 150 MICROGram(s) Oral daily  pantoprazole    Tablet 40 milliGRAM(s) Oral before breakfast    MEDICATIONS  (PRN):  acetaminophen     Tablet .. 650 milliGRAM(s) Oral every 6 hours PRN Temp greater or equal to 38C (100.4F), Mild Pain (1 - 3), Moderate Pain (4 - 6)  morphine  - Injectable 2 milliGRAM(s) IV Push every 4 hours PRN Severe Pain (7 - 10)    Allergies    No Known Allergies    Intolerances    Vital Signs Last 24 Hrs  T(C): 37.1 (18 May 2022 17:02), Max: 37.2 (18 May 2022 10:20)  T(F): 98.8 (18 May 2022 17:02), Max: 98.9 (18 May 2022 10:20)  HR: 68 (18 May 2022 17:02) (68 - 75)  BP: 115/65 (18 May 2022 17:02) (108/70 - 125/65)  BP(mean): --  RR: 19 (18 May 2022 17:02) (16 - 20)  SpO2: 99% (18 May 2022 17:02) (98% - 100%)  Daily     Daily     Exam:  General: resting in bed, NAD  Resp: nonlabored breathing  Abd: soft, mild epigastric TTP; ND  Ext: WWP  Neuro: AAOx3                        8.7    6.34  )-----------( 318      ( 18 May 2022 06:20 )             28.4     05-18    140  |  101  |  11  ----------------------------<  65<L>  4.2   |  24  |  0.76    Ca    9.0      18 May 2022 06:20  Phos  3.0     05-18  Mg     1.9     05-18    TPro  6.5  /  Alb  3.7  /  TBili  0.4  /  DBili  x   /  AST  9<L>  /  ALT  <5<L>  /  AlkPhos  67  05-18    IMAGING STUDIES:  < from: CT Abdomen and Pelvis w/ IV Cont (05.17.22 @ 13:37) >    FINDINGS:  LOWER CHEST: Linear atelectasis in the left lower lobe.    LIVER: Within normal limits.  BILE DUCTS: Normal caliber.  GALLBLADDER: Within normal limits.  SPLEEN: Few tiny residual hypodensities/chronic infarcts.  PANCREAS: Peripancreatic inflammatory changes decreased as compared to   prior CT 3/10/2021. Uncinate process collection is increased in size   measuring 2.4 x 2.2 cm (2, 46), previously 2.4 x 1.8 cm. Increased   collection in the pancreatic head measuring 3.6 x 3.2 cm (2, 42). New 1.1   cm collection in the pancreatic body (2, 34). Collection extending from   the pancreatic tail to the posterior stomach and splenic hilum is   decreased measuring 4.1 x 3.1 cm (2, 25), previously 5.1 x 4.5 cm.   Previously noted subphrenic collections are resolved. Wall thickening of   the proximal stomach with a new 1.6 x 1.0 cm intramural collection   anteriorly (2, 18).  ADRENALS: Within normal limits.  KIDNEYS/URETERS: Within normal limits.    BLADDER: Within normal limits.  REPRODUCTIVE ORGANS: Prostate within normal limits.    BOWEL: No bowel obstruction. Appendix is normal.  PERITONEUM: Trace pelvic ascites and left subphrenic fluid.  VESSELS: Atherosclerotic changes. 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 Uncinate process cyst exerting mass   effect on the SMV which is otherwise patent. Portal vein is patent.  RETROPERITONEUM/LYMPH NODES: Upper abdominal lymphadenopathy, mildly   increased. For reference, gastrohepatic node measures 3.0 x 2.1 cm,  previously 2.6 x 1.7 cm. Small retroperitoneal lymph nodes are also noted.  ABDOMINAL WALL: Within normal limits.  BONES: Within normal limits.    IMPRESSION:  Persistent peripancreatic inflammatory change, decreased from prior CT   3/10/2021.    New and increased pancreatic collections/pseudocysts and new small   gastric intramural collection.    Left upper quadrant collection is decreased and left subphrenic   collections are resolved.    < end of copied text >             GENERAL SURGERY CONSULT NOTE    Patient is a 56y old  Male who presents with a chief complaint of Epigastric Pain (18 May 2022 15:51)    HPI:  56M with PMHx of HTN, chronic pancreatitis (with result pancreatic pseudocysts), and hypothyroidism presenting with acute-on-chronic epigastric pain associated with decrease PO intake. Patient has a known history of pancreatic pseudocysts and was last admitted here in March 2021. At that time was seen by GI and recommended for eventual necrosectomy and cystgastrostomy. Patient followed up outpatient with surgeon and recommended for surgical intervention, but then established care in [place]. He follows with Raza Blood at [place] who referred him to surgeon Dr. Anderson Workman and GI Dr. Rafiq Escobar at [facility] who performed an EUS and drained one of his cysts on 5/2. Pathology not known to patient. Since then has been having severe pain in epigastric region with no radiation. No known alleviating factors, but aggravated by food and movement. When pain happens he feels like burping. He states feels similar to his pancreatic pain. He was pending surgical evaluation by Anderson Carson. In the ED had CT A&P showing persistent peripancreatic inflammatory change with pancreatic pseudocysts. In the ED was given Pepcid, Morphine, and Zofran.    Of note, patient previously seen by Dr. Duke for pancreatic pseudocysts. He was told to follow up outpatient for possible GI drainage. Patient was unhappy with outpatient procedure that could've been done while inpatient. Patient prefers to be evaluated by another surgeon    10-points review of system performed with pertinent negative and positive findings documented in the HPI     PAST MEDICAL & SURGICAL HISTORY:  Hypothyroid    Hernia    Hypertension    Pancreatitis    History of back surgery    FAMILY HISTORY:  No pertinent family history in first degree relatives    : Family history not pertinent as reviewed with the patient and family    SOCIAL HISTORY: No pertinent social history    MEDICATIONS  (STANDING):  amLODIPine   Tablet 10 milliGRAM(s) Oral daily  enoxaparin Injectable 40 milliGRAM(s) SubCutaneous every 24 hours  lactated ringers. 1000 milliLiter(s) (200 mL/Hr) IV Continuous <Continuous>  levothyroxine 150 MICROGram(s) Oral daily  pantoprazole    Tablet 40 milliGRAM(s) Oral before breakfast    MEDICATIONS  (PRN):  acetaminophen     Tablet .. 650 milliGRAM(s) Oral every 6 hours PRN Temp greater or equal to 38C (100.4F), Mild Pain (1 - 3), Moderate Pain (4 - 6)  morphine  - Injectable 2 milliGRAM(s) IV Push every 4 hours PRN Severe Pain (7 - 10)    Allergies    No Known Allergies    Intolerances    Vital Signs Last 24 Hrs  T(C): 37.1 (18 May 2022 17:02), Max: 37.2 (18 May 2022 10:20)  T(F): 98.8 (18 May 2022 17:02), Max: 98.9 (18 May 2022 10:20)  HR: 68 (18 May 2022 17:02) (68 - 75)  BP: 115/65 (18 May 2022 17:02) (108/70 - 125/65)  BP(mean): --  RR: 19 (18 May 2022 17:02) (16 - 20)  SpO2: 99% (18 May 2022 17:02) (98% - 100%)  Daily     Daily     Exam:  General: resting in bed, NAD  Resp: nonlabored breathing  Abd: soft, mild epigastric TTP; ND  Ext: WWP  Neuro: AAOx3                        8.7    6.34  )-----------( 318      ( 18 May 2022 06:20 )             28.4     05-18    140  |  101  |  11  ----------------------------<  65<L>  4.2   |  24  |  0.76    Ca    9.0      18 May 2022 06:20  Phos  3.0     05-18  Mg     1.9     05-18    TPro  6.5  /  Alb  3.7  /  TBili  0.4  /  DBili  x   /  AST  9<L>  /  ALT  <5<L>  /  AlkPhos  67  05-18    IMAGING STUDIES:  < from: CT Abdomen and Pelvis w/ IV Cont (05.17.22 @ 13:37) >    FINDINGS:  LOWER CHEST: Linear atelectasis in the left lower lobe.    LIVER: Within normal limits.  BILE DUCTS: Normal caliber.  GALLBLADDER: Within normal limits.  SPLEEN: Few tiny residual hypodensities/chronic infarcts.  PANCREAS: Peripancreatic inflammatory changes decreased as compared to   prior CT 3/10/2021. Uncinate process collection is increased in size   measuring 2.4 x 2.2 cm (2, 46), previously 2.4 x 1.8 cm. Increased   collection in the pancreatic head measuring 3.6 x 3.2 cm (2, 42). New 1.1   cm collection in the pancreatic body (2, 34). Collection extending from   the pancreatic tail to the posterior stomach and splenic hilum is   decreased measuring 4.1 x 3.1 cm (2, 25), previously 5.1 x 4.5 cm.   Previously noted subphrenic collections are resolved. Wall thickening of   the proximal stomach with a new 1.6 x 1.0 cm intramural collection   anteriorly (2, 18).  ADRENALS: Within normal limits.  KIDNEYS/URETERS: Within normal limits.    BLADDER: Within normal limits.  REPRODUCTIVE ORGANS: Prostate within normal limits.    BOWEL: No bowel obstruction. Appendix is normal.  PERITONEUM: Trace pelvic ascites and left subphrenic fluid.  VESSELS: Atherosclerotic changes. 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 Uncinate process cyst exerting mass   effect on the SMV which is otherwise patent. Portal vein is patent.  RETROPERITONEUM/LYMPH NODES: Upper abdominal lymphadenopathy, mildly   increased. For reference, gastrohepatic node measures 3.0 x 2.1 cm,  previously 2.6 x 1.7 cm. Small retroperitoneal lymph nodes are also noted.  ABDOMINAL WALL: Within normal limits.  BONES: Within normal limits.    IMPRESSION:  Persistent peripancreatic inflammatory change, decreased from prior CT   3/10/2021.    New and increased pancreatic collections/pseudocysts and new small   gastric intramural collection.    Left upper quadrant collection is decreased and left subphrenic   collections are resolved.    < end of copied text >

## 2022-05-18 NOTE — CONSULT NOTE ADULT - ASSESSMENT
56M with PMHx of HTN, GERD, chronic pancreatitis with known pseudocyst, recent EUS and cyst drainage presents with acute-on-chronic epigastric pain. CT persistent peripancreatic inflammatory changes and multiple pancreatic collections. Epigastric pain likely related to gastritis    Recommendation:  - No acute surgical intervention  - GI evaluation for epigastric pain  - NPO, IVF  - pain control, PPI      [id number]     56M with PMHx of HTN, GERD, chronic pancreatitis with known pseudocyst, recent EUS and cyst drainage presents with acute-on-chronic epigastric pain. CT persistent peripancreatic inflammatory changes and multiple pancreatic collections. Epigastric pain likely related to gastritis    Recommendation:  - No acute surgical intervention for pancreatic collection  - GI evaluation for epigastric pain  - pain control, PPI  - patient may follow up with his surgeon as outpatient  - plan discussed with attending, Dr. Washburn    [id number]

## 2022-05-18 NOTE — PATIENT PROFILE ADULT - FALL HARM RISK - UNIVERSAL INTERVENTIONS
Bed in lowest position, wheels locked, appropriate side rails in place/Call bell, personal items and telephone in reach/Instruct patient to call for assistance before getting out of bed or chair/Non-slip footwear when patient is out of bed/[place] to call system/Physically safe environment - no spills, clutter or unnecessary equipment/Purposeful Proactive Rounding/Room/bathroom lighting operational, light cord in reach

## 2022-05-18 NOTE — CONSULT NOTE ADULT - SUBJECTIVE AND OBJECTIVE BOX
Carlos A Samayoa MD  Interventional Cardiology / Endovascular Specialist  [place] Office : [address] [place] [address] [place] [address]  Tel:   [place] Office : [address] [facility] [place] [address]  Tel: [phone]        HISTORY OF PRESENTING ILLNESS:    56M with PMHx of HTN, chronic pancreatitis (with result pancreatic pseudocysts), and hypothyroidism presenting with acute-on-chronic epigastric pain associated with decrease PO intake. Patient has a known history of pancreatic pseudocysts and was last admitted here in March 2021. At that time was seen by GI and recommended for eventual necrosectomy and cystgastrostomy. Patient followed up outpatient with our surgeon and recommended for surgical intervention, but then established care in [place]. He follows with Raza Blood at [place] who referred him to Rafiq Escobar at [facility] who performed an EUS and drained one of his cysts on 5/2. Pathology not known to patient. Since then has been having severe pain in epigastric region with no radiation. No known alleviating factors, but aggravated by food and movement. When pain happens he feels like burping. He states feels similar to his pancreatic pain. He was pending surgical evaluation by Anderson Carson. In the ED had CT A&P showing persistent peripancreatic inflammatory change with pancreatic pseudocysts. In the ED was given Pepcid, Morphine, and Zofran. denies CP SOB with >  4mets     PAST MEDICAL & SURGICAL HISTORY:  Hypothyroid      Hernia      Hypertension      Pancreatitis      History of back surgery          SOCIAL HISTORY: Substance Use (street drugs): ( x ) never used  (  ) other:    FAMILY HISTORY:  No pertinent family history in first degree relatives        REVIEW OF SYSTEMS:  CONSTITUTIONAL: No fever, weight loss, or fatigue  EYES: No eye pain, visual disturbances, or discharge  ENMT:  No difficulty hearing, tinnitus, vertigo; No sinus or throat pain  BREASTS: No pain, masses, or nipple discharge  GASTROINTESTINAL: No abdominal or epigastric pain. No nausea, vomiting, or hematemesis; No diarrhea or constipation. No melena or hematochezia.  GENITOURINARY: No dysuria, frequency, hematuria, or incontinence  NEUROLOGICAL: No headaches, memory loss, loss of strength, numbness, or tremors  ENDOCRINE: No heat or cold intolerance; No hair loss  MUSCULOSKELETAL: No joint pain or swelling; No muscle, back, or extremity pain  PSYCHIATRIC: No depression, anxiety, mood swings, or difficulty sleeping  HEME/LYMPH: No easy bruising, or bleeding gums  All others negative    MEDICATIONS:  amLODIPine   Tablet 10 milliGRAM(s) Oral daily  enoxaparin Injectable 40 milliGRAM(s) SubCutaneous every 24 hours        acetaminophen     Tablet .. 650 milliGRAM(s) Oral every 6 hours PRN  morphine  - Injectable 2 milliGRAM(s) IV Push every 4 hours PRN    pantoprazole    Tablet 40 milliGRAM(s) Oral before breakfast    levothyroxine 150 MICROGram(s) Oral daily    lactated ringers. 1000 milliLiter(s) IV Continuous <Continuous>      FAMILY HISTORY:  No pertinent family history in first degree relatives          Allergies    No Known Allergies    Intolerances    	      PHYSICAL EXAM:  T(C): 37.1 (05-18-22 @ 21:29), Max: 37.2 (05-18-22 @ 10:20)  HR: 64 (05-18-22 @ 21:29) (64 - 75)  BP: 113/66 (05-18-22 @ 21:29) (108/70 - 125/65)  RR: 19 (05-18-22 @ 21:29) (16 - 20)  SpO2: 98% (05-18-22 @ 21:29) (98% - 100%)  Wt(kg): --  I&O's Summary    18 May 2022 07:01  -  18 May 2022 22:08  --------------------------------------------------------  IN: 1200 mL / OUT: 950 mL / NET: 250 mL        GENERAL: NAD  EYES: conjunctiva and sclera clear  ENMT: No tonsillar erythema, exudates, or enlargement   Cardiovascular: Normal S1 S2, No JVD, No murmurs, No edema  Respiratory: Lungs clear to auscultation	  Gastrointestinal:  Soft, Non-tender, + BS	  Extremities: No edema        LABS:	 	    CARDIAC MARKERS:                                  8.7    6.34  )-----------( 318      ( 18 May 2022 06:20 )             28.4     05-18    140  |  101  |  11  ----------------------------<  65<L>  4.2   |  24  |  0.76    Ca    9.0      18 May 2022 06:20  Phos  3.0     05-18  Mg     1.9     05-18    TPro  6.5  /  Alb  3.7  /  TBili  0.4  /  DBili  x   /  AST  9<L>  /  ALT  <5<L>  /  AlkPhos  67  05-18    proBNP:   Lipid Profile:   HgA1c:   TSH:     Consultant(s) Notes Reviewed:  [x ] YES  [ ] NO    Care Discussed with Consultants/Other Providers [ x] YES  [ ] NO    Imaging Personally Reviewed independently:  [x] YES  [ ] NO    All labs, radiologic studies, vitals, orders and medications list reviewed. Patient is seen and examined at bedside. Case discussed with medical team.    ASSESSMENT/PLAN:

## 2022-05-18 NOTE — CONSULT NOTE ADULT - ASSESSMENT
EKG SR LVH     ASSESSMENT AND PLAN     1) Pre op eval : denies CP SOB , currently felling better , EKG with LVH , optimized form cadiacstant point for pancreatic cyst removal     2) ABD pain t/t per primary team     3) DVT PPX lovenox

## 2022-05-19 LAB
ALBUMIN SERPL ELPH-MCNC: 3.1 G/DL — LOW (ref 3.3–5)
ALP SERPL-CCNC: 59 U/L — SIGNIFICANT CHANGE UP (ref 40–120)
ALT FLD-CCNC: 5 U/L — LOW (ref 10–45)
ANION GAP SERPL CALC-SCNC: 14 MMOL/L — SIGNIFICANT CHANGE UP (ref 5–17)
AST SERPL-CCNC: 9 U/L — LOW (ref 10–40)
BILIRUB SERPL-MCNC: 0.5 MG/DL — SIGNIFICANT CHANGE UP (ref 0.2–1.2)
BUN SERPL-MCNC: 8 MG/DL — SIGNIFICANT CHANGE UP (ref 7–23)
CALCIUM SERPL-MCNC: 8.6 MG/DL — SIGNIFICANT CHANGE UP (ref 8.4–10.5)
CHLORIDE SERPL-SCNC: 99 MMOL/L — SIGNIFICANT CHANGE UP (ref 96–108)
CO2 SERPL-SCNC: 22 MMOL/L — SIGNIFICANT CHANGE UP (ref 22–31)
CREAT SERPL-MCNC: 0.7 MG/DL — SIGNIFICANT CHANGE UP (ref 0.5–1.3)
EGFR: 108 ML/MIN/1.73M2 — SIGNIFICANT CHANGE UP
GLUCOSE BLDC GLUCOMTR-MCNC: 74 MG/DL — SIGNIFICANT CHANGE UP (ref 70–99)
GLUCOSE SERPL-MCNC: 55 MG/DL — LOW (ref 70–99)
HCT VFR BLD CALC: 25.7 % — LOW (ref 39–50)
HGB BLD-MCNC: 7.8 G/DL — LOW (ref 13–17)
MCHC RBC-ENTMCNC: 28 PG — SIGNIFICANT CHANGE UP (ref 27–34)
MCHC RBC-ENTMCNC: 30.4 GM/DL — LOW (ref 32–36)
MCV RBC AUTO: 92.1 FL — SIGNIFICANT CHANGE UP (ref 80–100)
NRBC # BLD: 0 /100 WBCS — SIGNIFICANT CHANGE UP (ref 0–0)
PLATELET # BLD AUTO: 277 K/UL — SIGNIFICANT CHANGE UP (ref 150–400)
POTASSIUM SERPL-MCNC: 4.2 MMOL/L — SIGNIFICANT CHANGE UP (ref 3.5–5.3)
POTASSIUM SERPL-SCNC: 4.2 MMOL/L — SIGNIFICANT CHANGE UP (ref 3.5–5.3)
PROT SERPL-MCNC: 5.8 G/DL — LOW (ref 6–8.3)
RBC # BLD: 2.79 M/UL — LOW (ref 4.2–5.8)
RBC # FLD: 15.9 % — HIGH (ref 10.3–14.5)
SODIUM SERPL-SCNC: 135 MMOL/L — SIGNIFICANT CHANGE UP (ref 135–145)
WBC # BLD: 4.52 K/UL — SIGNIFICANT CHANGE UP (ref 3.8–10.5)
WBC # FLD AUTO: 4.52 K/UL — SIGNIFICANT CHANGE UP (ref 3.8–10.5)

## 2022-05-19 RX ADMIN — MORPHINE SULFATE 2 MILLIGRAM(S): 50 CAPSULE, EXTENDED RELEASE ORAL at 16:26

## 2022-05-19 RX ADMIN — MORPHINE SULFATE 2 MILLIGRAM(S): 50 CAPSULE, EXTENDED RELEASE ORAL at 20:32

## 2022-05-19 RX ADMIN — AMLODIPINE BESYLATE 10 MILLIGRAM(S): 2.5 TABLET ORAL at 05:31

## 2022-05-19 RX ADMIN — ENOXAPARIN SODIUM 40 MILLIGRAM(S): 100 INJECTION SUBCUTANEOUS at 20:33

## 2022-05-19 RX ADMIN — MORPHINE SULFATE 2 MILLIGRAM(S): 50 CAPSULE, EXTENDED RELEASE ORAL at 12:43

## 2022-05-19 RX ADMIN — MORPHINE SULFATE 2 MILLIGRAM(S): 50 CAPSULE, EXTENDED RELEASE ORAL at 04:47

## 2022-05-19 RX ADMIN — MORPHINE SULFATE 2 MILLIGRAM(S): 50 CAPSULE, EXTENDED RELEASE ORAL at 21:02

## 2022-05-19 RX ADMIN — PANTOPRAZOLE SODIUM 40 MILLIGRAM(S): 20 TABLET, DELAYED RELEASE ORAL at 05:31

## 2022-05-19 RX ADMIN — MORPHINE SULFATE 2 MILLIGRAM(S): 50 CAPSULE, EXTENDED RELEASE ORAL at 12:39

## 2022-05-19 RX ADMIN — Medication 150 MICROGRAM(S): at 05:32

## 2022-05-19 RX ADMIN — MORPHINE SULFATE 2 MILLIGRAM(S): 50 CAPSULE, EXTENDED RELEASE ORAL at 00:24

## 2022-05-19 RX ADMIN — MORPHINE SULFATE 2 MILLIGRAM(S): 50 CAPSULE, EXTENDED RELEASE ORAL at 11:50

## 2022-05-19 RX ADMIN — MORPHINE SULFATE 2 MILLIGRAM(S): 50 CAPSULE, EXTENDED RELEASE ORAL at 08:45

## 2022-05-19 RX ADMIN — MORPHINE SULFATE 2 MILLIGRAM(S): 50 CAPSULE, EXTENDED RELEASE ORAL at 04:17

## 2022-05-19 NOTE — PROGRESS NOTE ADULT - ASSESSMENT
pt with crhonic pancreatitis with psudocyt  pt has been followed in [facility] by gi and had eus fna  recommend follow up with them outpatient  advanced team consut can be perfored  discharge planning pt on ipad looks stable

## 2022-05-19 NOTE — CHART NOTE - NSCHARTNOTEFT_GEN_A_CORE
Reviewed case and imaging with advanced GI attending. Pancreatic collections all too small for intervention or drainage. No plans from advanced GI time. Defer care to primary GI attending and primary team.

## 2022-05-19 NOTE — PROGRESS NOTE ADULT - ASSESSMENT
EKG SR LVH     ASSESSMENT AND PLAN     1) Pre op eval : denies CP SOB , currently felling better , EKG with LVH , optimized form cadiacstant point for pancreatic cyst removal     2) ABD pain t/t per primary team     3) DVT PPX lovenox    EKG SR LVH     ASSESSMENT AND PLAN     1) Pre op eval : denies CP SOB , currently felling better , EKG with LVH , optimized form cardiac standpoint for pancreatic cyst drainage, pt refusing surgery     2) ABD pain t/t per primary team     3) DVT PPX lovenox

## 2022-05-20 LAB
ALBUMIN SERPL ELPH-MCNC: 3.9 G/DL — SIGNIFICANT CHANGE UP (ref 3.3–5)
ALP SERPL-CCNC: 71 U/L — SIGNIFICANT CHANGE UP (ref 40–120)
ALT FLD-CCNC: <5 U/L — LOW (ref 10–45)
ANION GAP SERPL CALC-SCNC: 18 MMOL/L — HIGH (ref 5–17)
AST SERPL-CCNC: 10 U/L — SIGNIFICANT CHANGE UP (ref 10–40)
BILIRUB SERPL-MCNC: 0.6 MG/DL — SIGNIFICANT CHANGE UP (ref 0.2–1.2)
BUN SERPL-MCNC: 5 MG/DL — LOW (ref 7–23)
CALCIUM SERPL-MCNC: 9.5 MG/DL — SIGNIFICANT CHANGE UP (ref 8.4–10.5)
CHLORIDE SERPL-SCNC: 98 MMOL/L — SIGNIFICANT CHANGE UP (ref 96–108)
CO2 SERPL-SCNC: 19 MMOL/L — LOW (ref 22–31)
CREAT SERPL-MCNC: 0.79 MG/DL — SIGNIFICANT CHANGE UP (ref 0.5–1.3)
EGFR: 104 ML/MIN/1.73M2 — SIGNIFICANT CHANGE UP
GLUCOSE BLDC GLUCOMTR-MCNC: 189 MG/DL — HIGH (ref 70–99)
GLUCOSE BLDC GLUCOMTR-MCNC: 73 MG/DL — SIGNIFICANT CHANGE UP (ref 70–99)
GLUCOSE BLDC GLUCOMTR-MCNC: 98 MG/DL — SIGNIFICANT CHANGE UP (ref 70–99)
GLUCOSE SERPL-MCNC: 67 MG/DL — LOW (ref 70–99)
HCT VFR BLD CALC: 30.9 % — LOW (ref 39–50)
HGB BLD-MCNC: 9.6 G/DL — LOW (ref 13–17)
MCHC RBC-ENTMCNC: 28 PG — SIGNIFICANT CHANGE UP (ref 27–34)
MCHC RBC-ENTMCNC: 31.1 GM/DL — LOW (ref 32–36)
MCV RBC AUTO: 90.1 FL — SIGNIFICANT CHANGE UP (ref 80–100)
NRBC # BLD: 0 /100 WBCS — SIGNIFICANT CHANGE UP (ref 0–0)
PLATELET # BLD AUTO: 357 K/UL — SIGNIFICANT CHANGE UP (ref 150–400)
POTASSIUM SERPL-MCNC: 4.4 MMOL/L — SIGNIFICANT CHANGE UP (ref 3.5–5.3)
POTASSIUM SERPL-SCNC: 4.4 MMOL/L — SIGNIFICANT CHANGE UP (ref 3.5–5.3)
PROT SERPL-MCNC: 7 G/DL — SIGNIFICANT CHANGE UP (ref 6–8.3)
RBC # BLD: 3.43 M/UL — LOW (ref 4.2–5.8)
RBC # FLD: 15.7 % — HIGH (ref 10.3–14.5)
SODIUM SERPL-SCNC: 135 MMOL/L — SIGNIFICANT CHANGE UP (ref 135–145)
WBC # BLD: 9.79 K/UL — SIGNIFICANT CHANGE UP (ref 3.8–10.5)
WBC # FLD AUTO: 9.79 K/UL — SIGNIFICANT CHANGE UP (ref 3.8–10.5)

## 2022-05-20 PROCEDURE — 99232 SBSQ HOSP IP/OBS MODERATE 35: CPT

## 2022-05-20 RX ORDER — TRAMADOL HYDROCHLORIDE 50 MG/1
50 TABLET ORAL EVERY 6 HOURS
Refills: 0 | Status: DISCONTINUED | OUTPATIENT
Start: 2022-05-20 | End: 2022-05-21

## 2022-05-20 RX ORDER — DIPHENHYDRAMINE HYDROCHLORIDE AND LIDOCAINE HYDROCHLORIDE AND ALUMINUM HYDROXIDE AND MAGNESIUM HYDRO
10 KIT
Refills: 0 | Status: DISCONTINUED | OUTPATIENT
Start: 2022-05-20 | End: 2022-05-23

## 2022-05-20 RX ORDER — PANTOPRAZOLE SODIUM 20 MG/1
40 TABLET, DELAYED RELEASE ORAL
Refills: 0 | Status: DISCONTINUED | OUTPATIENT
Start: 2022-05-20 | End: 2022-05-23

## 2022-05-20 RX ADMIN — TRAMADOL HYDROCHLORIDE 50 MILLIGRAM(S): 50 TABLET ORAL at 22:18

## 2022-05-20 RX ADMIN — TRAMADOL HYDROCHLORIDE 50 MILLIGRAM(S): 50 TABLET ORAL at 21:48

## 2022-05-20 RX ADMIN — MORPHINE SULFATE 2 MILLIGRAM(S): 50 CAPSULE, EXTENDED RELEASE ORAL at 01:00

## 2022-05-20 RX ADMIN — MORPHINE SULFATE 2 MILLIGRAM(S): 50 CAPSULE, EXTENDED RELEASE ORAL at 13:47

## 2022-05-20 RX ADMIN — MORPHINE SULFATE 2 MILLIGRAM(S): 50 CAPSULE, EXTENDED RELEASE ORAL at 17:53

## 2022-05-20 RX ADMIN — Medication 150 MICROGRAM(S): at 05:28

## 2022-05-20 RX ADMIN — AMLODIPINE BESYLATE 10 MILLIGRAM(S): 2.5 TABLET ORAL at 05:28

## 2022-05-20 RX ADMIN — PANTOPRAZOLE SODIUM 40 MILLIGRAM(S): 20 TABLET, DELAYED RELEASE ORAL at 18:01

## 2022-05-20 RX ADMIN — ENOXAPARIN SODIUM 40 MILLIGRAM(S): 100 INJECTION SUBCUTANEOUS at 21:50

## 2022-05-20 RX ADMIN — MORPHINE SULFATE 2 MILLIGRAM(S): 50 CAPSULE, EXTENDED RELEASE ORAL at 14:20

## 2022-05-20 RX ADMIN — MORPHINE SULFATE 2 MILLIGRAM(S): 50 CAPSULE, EXTENDED RELEASE ORAL at 17:31

## 2022-05-20 RX ADMIN — MORPHINE SULFATE 2 MILLIGRAM(S): 50 CAPSULE, EXTENDED RELEASE ORAL at 00:37

## 2022-05-20 RX ADMIN — MORPHINE SULFATE 2 MILLIGRAM(S): 50 CAPSULE, EXTENDED RELEASE ORAL at 05:21

## 2022-05-20 RX ADMIN — MORPHINE SULFATE 2 MILLIGRAM(S): 50 CAPSULE, EXTENDED RELEASE ORAL at 01:07

## 2022-05-20 RX ADMIN — MORPHINE SULFATE 2 MILLIGRAM(S): 50 CAPSULE, EXTENDED RELEASE ORAL at 09:28

## 2022-05-20 RX ADMIN — PANTOPRAZOLE SODIUM 40 MILLIGRAM(S): 20 TABLET, DELAYED RELEASE ORAL at 05:22

## 2022-05-20 RX ADMIN — MORPHINE SULFATE 2 MILLIGRAM(S): 50 CAPSULE, EXTENDED RELEASE ORAL at 05:51

## 2022-05-20 NOTE — DIETITIAN INITIAL EVALUATION ADULT - REASON FOR ADMISSION
Gastritis without bleeding    Chart reviewed, events noted. This is a "56M with PMHx of HTN, chronic pancreatitis (with result pancreatic pseudocysts), and hypothyroidism presenting with acute-on-chronic epigastric pain associated with decrease PO intake."

## 2022-05-20 NOTE — DIETITIAN INITIAL EVALUATION ADULT - PERTINENT MEDS FT
MEDICATIONS  (STANDING):  amLODIPine   Tablet 10 milliGRAM(s) Oral daily  enoxaparin Injectable 40 milliGRAM(s) SubCutaneous every 24 hours  lactated ringers. 1000 milliLiter(s) (200 mL/Hr) IV Continuous <Continuous>  levothyroxine 150 MICROGram(s) Oral daily  pantoprazole    Tablet 40 milliGRAM(s) Oral before breakfast    MEDICATIONS  (PRN):  acetaminophen     Tablet .. 650 milliGRAM(s) Oral every 6 hours PRN Temp greater or equal to 38C (100.4F), Mild Pain (1 - 3), Moderate Pain (4 - 6)  morphine  - Injectable 2 milliGRAM(s) IV Push every 4 hours PRN Severe Pain (7 - 10)

## 2022-05-20 NOTE — DIETITIAN INITIAL EVALUATION ADULT - ADD RECOMMEND
1) Medical team to advance diet when medically feasible. Consider advancing to clear liquid, followed by low fat diet as tolerated. 2) Recommend Ensure clear 2x daily followed by Ensure Enlive 2x daily pending diet advancement. 3) Encourage PO intake of protein-rich foods. 4) RD to remain available and follow-up as medically appropriate.

## 2022-05-20 NOTE — DIETITIAN INITIAL EVALUATION ADULT - ENERGY INTAKE
Poor (<50%) In-house pt has been NPO x 3 days, diet advanced to clear liquids this morning. Pt still NPO (with sips & chips) during RD visit, reports just having some ice chips. Denies any nausea currently. Discussed typical diet advancement. Patient with no nutrition-related questions at this time. Made aware RD remains available as needed.

## 2022-05-20 NOTE — CONSULT NOTE ADULT - ASSESSMENT
56M known to our service. PMHx of HTN, GERD, PTSD (9/11), Alcoholic pancreatitis, pancreatic pseudocysts, and hypothyroidism    Admitted with acute-on-chronic epigastric pain associated with decrease PO intake.     Patient has a known history of pancreatic pseudocysts and was last admitted here in March 2021. At that time was seen by GI and recommended for eventual necrosectomy and cystgastrostomy. Patient followed up outpatient with our surgeon and recommended for surgical intervention, but then established care in [place]. He follows with Raza Blood at [place] who referred him to Rafiq Escobar at [facility] who performed an EUS and drained one of his cysts on 5/2. Pathology not known to patient. Since then has been having severe pain in epigastric region with no radiation. No known alleviating factors, but aggravated by food and movement. When pain happens he feels like burping. He states feels similar to his pancreatic pain. He was pending surgical evaluation by Anderson Carson. In the ED had CT A&P showing persistent peripancreatic inflammatory change with pancreatic pseudocysts. In the ED was given Pepcid, Morphine, and Zofran. (17 May 2022 17:57)    Current out- patient pain regimen: None  Out Patient Pain Management provider: Has seen Dr. Theo Mason and Dr. Alek Kang in the past    Opioid Risk Tool (ORT-OUD) Score: High  Pt and father Hx EtOH, patient with PTSD "from 9/11"    Pain Score: 9/10    Pt seen sitting up in bed, appears comfortable except occasional belch with wincing. Pt reports he has chronic back pain and Chronic Pancreatitis but his concern is epigastric fullness, pressure, burning and pain which is worse after eating/drinking. Pain improved with Protonix.     Plan discussed with primary team:  Consider Carafate or Magic Mouthwash for esophageal discomfort  Pt has been on MS IR IV 2 mg every 4 hours since admission, discontinue  To avoid withdrawal, start Tramadol 50 mg every 6 hours x 2 days then decrease to every 12 hours x 2 days then QD x 2 days then discontinue  Bowel Regimen  Incentive Spirometer  PT per primary team  Monitor for sedation, respiratory depression  Narcan Rescue Kit on discharge (Naloxone 4 mg/0.1 ml nasal spray - 1 spray q 2-3 minutes alternating between nostrils)    This is acute pain, can be managed by primary team  Signing off    Time spent on encounter:    30    Minutes    Chronic Pain Service  [phone]

## 2022-05-20 NOTE — PROGRESS NOTE ADULT - ASSESSMENT
EKG SR LVH     ASSESSMENT AND PLAN     1) Pre op eval : denies CP SOB , currently felling better , EKG with LVH , planned for conservative management     2) ABD pain t/t per primary team     3) DVT PPX lovenox

## 2022-05-20 NOTE — DIETITIAN INITIAL EVALUATION ADULT - PERTINENT LABORATORY DATA
05-20    135  |  98  |  5<L>  ----------------------------<  67<L>  4.4   |  19<L>  |  0.79    Ca    9.5      20 May 2022 07:20    TPro  7.0  /  Alb  3.9  /  TBili  0.6  /  DBili  x   /  AST  10  /  ALT  <5<L>  /  AlkPhos  71  05-20  POCT Blood Glucose.: 73 mg/dL (05-20-22 @ 06:17)

## 2022-05-20 NOTE — DIETITIAN INITIAL EVALUATION ADULT - ORAL INTAKE PTA/DIET HISTORY
Pt reports poor PO intake for the last few days PTA due to nausea and sensation of food not staying down, denies coughing or swallowing issues. Consumes regular diet with no restrictions. Pt denies chewing/swallowing difficulty, vomiting, diarrhea, constipation.

## 2022-05-20 NOTE — DIETITIAN INITIAL EVALUATION ADULT - OTHER INFO
Weight: pt endorses weight loss since January, reports weight is usually ~ 178-180lbs, states weight now ~ 161lbs. Current dosing weight is 166.5lbs

## 2022-05-20 NOTE — DIETITIAN INITIAL EVALUATION ADULT - ETIOLOGY
related to inability to consume adequate nutrition in the setting of altered GI function (chronic pancreatitis)

## 2022-05-21 LAB
ALBUMIN SERPL ELPH-MCNC: 3.7 G/DL — SIGNIFICANT CHANGE UP (ref 3.3–5)
ALP SERPL-CCNC: 64 U/L — SIGNIFICANT CHANGE UP (ref 40–120)
ALT FLD-CCNC: 5 U/L — LOW (ref 10–45)
ANION GAP SERPL CALC-SCNC: 11 MMOL/L — SIGNIFICANT CHANGE UP (ref 5–17)
AST SERPL-CCNC: 9 U/L — LOW (ref 10–40)
BILIRUB SERPL-MCNC: 0.5 MG/DL — SIGNIFICANT CHANGE UP (ref 0.2–1.2)
BUN SERPL-MCNC: 6 MG/DL — LOW (ref 7–23)
CALCIUM SERPL-MCNC: 9.4 MG/DL — SIGNIFICANT CHANGE UP (ref 8.4–10.5)
CHLORIDE SERPL-SCNC: 101 MMOL/L — SIGNIFICANT CHANGE UP (ref 96–108)
CO2 SERPL-SCNC: 26 MMOL/L — SIGNIFICANT CHANGE UP (ref 22–31)
CREAT SERPL-MCNC: 0.73 MG/DL — SIGNIFICANT CHANGE UP (ref 0.5–1.3)
EGFR: 107 ML/MIN/1.73M2 — SIGNIFICANT CHANGE UP
GLUCOSE BLDC GLUCOMTR-MCNC: 102 MG/DL — HIGH (ref 70–99)
GLUCOSE BLDC GLUCOMTR-MCNC: 147 MG/DL — HIGH (ref 70–99)
GLUCOSE BLDC GLUCOMTR-MCNC: 94 MG/DL — SIGNIFICANT CHANGE UP (ref 70–99)
GLUCOSE BLDC GLUCOMTR-MCNC: 94 MG/DL — SIGNIFICANT CHANGE UP (ref 70–99)
GLUCOSE SERPL-MCNC: 100 MG/DL — HIGH (ref 70–99)
POTASSIUM SERPL-MCNC: 4.2 MMOL/L — SIGNIFICANT CHANGE UP (ref 3.5–5.3)
POTASSIUM SERPL-SCNC: 4.2 MMOL/L — SIGNIFICANT CHANGE UP (ref 3.5–5.3)
PROT SERPL-MCNC: 7 G/DL — SIGNIFICANT CHANGE UP (ref 6–8.3)
SODIUM SERPL-SCNC: 138 MMOL/L — SIGNIFICANT CHANGE UP (ref 135–145)

## 2022-05-21 RX ORDER — SUCRALFATE 1 G
1 TABLET ORAL
Refills: 0 | Status: DISCONTINUED | OUTPATIENT
Start: 2022-05-21 | End: 2022-05-23

## 2022-05-21 RX ORDER — TRAMADOL HYDROCHLORIDE 50 MG/1
75 TABLET ORAL EVERY 6 HOURS
Refills: 0 | Status: DISCONTINUED | OUTPATIENT
Start: 2022-05-21 | End: 2022-05-23

## 2022-05-21 RX ORDER — FAMOTIDINE 10 MG/ML
20 INJECTION INTRAVENOUS ONCE
Refills: 0 | Status: COMPLETED | OUTPATIENT
Start: 2022-05-21 | End: 2022-05-21

## 2022-05-21 RX ADMIN — Medication 1 GRAM(S): at 13:55

## 2022-05-21 RX ADMIN — PANTOPRAZOLE SODIUM 40 MILLIGRAM(S): 20 TABLET, DELAYED RELEASE ORAL at 06:05

## 2022-05-21 RX ADMIN — FAMOTIDINE 20 MILLIGRAM(S): 10 INJECTION INTRAVENOUS at 21:26

## 2022-05-21 RX ADMIN — DIPHENHYDRAMINE HYDROCHLORIDE AND LIDOCAINE HYDROCHLORIDE AND ALUMINUM HYDROXIDE AND MAGNESIUM HYDRO 10 MILLILITER(S): KIT at 17:45

## 2022-05-21 RX ADMIN — DIPHENHYDRAMINE HYDROCHLORIDE AND LIDOCAINE HYDROCHLORIDE AND ALUMINUM HYDROXIDE AND MAGNESIUM HYDRO 10 MILLILITER(S): KIT at 06:10

## 2022-05-21 RX ADMIN — TRAMADOL HYDROCHLORIDE 75 MILLIGRAM(S): 50 TABLET ORAL at 23:49

## 2022-05-21 RX ADMIN — Medication 1 GRAM(S): at 17:46

## 2022-05-21 RX ADMIN — TRAMADOL HYDROCHLORIDE 50 MILLIGRAM(S): 50 TABLET ORAL at 04:45

## 2022-05-21 RX ADMIN — TRAMADOL HYDROCHLORIDE 50 MILLIGRAM(S): 50 TABLET ORAL at 04:15

## 2022-05-21 RX ADMIN — Medication 1 GRAM(S): at 23:49

## 2022-05-21 RX ADMIN — TRAMADOL HYDROCHLORIDE 75 MILLIGRAM(S): 50 TABLET ORAL at 17:50

## 2022-05-21 RX ADMIN — AMLODIPINE BESYLATE 10 MILLIGRAM(S): 2.5 TABLET ORAL at 06:05

## 2022-05-21 RX ADMIN — PANTOPRAZOLE SODIUM 40 MILLIGRAM(S): 20 TABLET, DELAYED RELEASE ORAL at 17:44

## 2022-05-21 RX ADMIN — ENOXAPARIN SODIUM 40 MILLIGRAM(S): 100 INJECTION SUBCUTANEOUS at 21:26

## 2022-05-21 RX ADMIN — Medication 650 MILLIGRAM(S): at 10:44

## 2022-05-21 RX ADMIN — Medication 150 MICROGRAM(S): at 06:05

## 2022-05-21 RX ADMIN — TRAMADOL HYDROCHLORIDE 50 MILLIGRAM(S): 50 TABLET ORAL at 10:44

## 2022-05-21 RX ADMIN — Medication 650 MILLIGRAM(S): at 17:47

## 2022-05-21 NOTE — PROGRESS NOTE ADULT - ASSESSMENT
continue conservative magnemnt   pt unhappy with care  recommend i have nothing to offer, advanced team does not recommend further rx  can follow up in gi clinic op or [facility]

## 2022-05-22 LAB
GLUCOSE BLDC GLUCOMTR-MCNC: 104 MG/DL — HIGH (ref 70–99)
GLUCOSE BLDC GLUCOMTR-MCNC: 149 MG/DL — HIGH (ref 70–99)
GLUCOSE BLDC GLUCOMTR-MCNC: 92 MG/DL — SIGNIFICANT CHANGE UP (ref 70–99)
GLUCOSE BLDC GLUCOMTR-MCNC: 94 MG/DL — SIGNIFICANT CHANGE UP (ref 70–99)

## 2022-05-22 RX ADMIN — DIPHENHYDRAMINE HYDROCHLORIDE AND LIDOCAINE HYDROCHLORIDE AND ALUMINUM HYDROXIDE AND MAGNESIUM HYDRO 10 MILLILITER(S): KIT at 05:37

## 2022-05-22 RX ADMIN — AMLODIPINE BESYLATE 10 MILLIGRAM(S): 2.5 TABLET ORAL at 05:37

## 2022-05-22 RX ADMIN — Medication 1 GRAM(S): at 11:01

## 2022-05-22 RX ADMIN — Medication 650 MILLIGRAM(S): at 00:48

## 2022-05-22 RX ADMIN — TRAMADOL HYDROCHLORIDE 75 MILLIGRAM(S): 50 TABLET ORAL at 17:01

## 2022-05-22 RX ADMIN — Medication 650 MILLIGRAM(S): at 23:13

## 2022-05-22 RX ADMIN — Medication 150 MICROGRAM(S): at 05:37

## 2022-05-22 RX ADMIN — TRAMADOL HYDROCHLORIDE 75 MILLIGRAM(S): 50 TABLET ORAL at 00:19

## 2022-05-22 RX ADMIN — Medication 650 MILLIGRAM(S): at 23:43

## 2022-05-22 RX ADMIN — PANTOPRAZOLE SODIUM 40 MILLIGRAM(S): 20 TABLET, DELAYED RELEASE ORAL at 17:02

## 2022-05-22 RX ADMIN — TRAMADOL HYDROCHLORIDE 75 MILLIGRAM(S): 50 TABLET ORAL at 06:07

## 2022-05-22 RX ADMIN — TRAMADOL HYDROCHLORIDE 75 MILLIGRAM(S): 50 TABLET ORAL at 11:00

## 2022-05-22 RX ADMIN — TRAMADOL HYDROCHLORIDE 75 MILLIGRAM(S): 50 TABLET ORAL at 23:13

## 2022-05-22 RX ADMIN — Medication 1 GRAM(S): at 17:02

## 2022-05-22 RX ADMIN — PANTOPRAZOLE SODIUM 40 MILLIGRAM(S): 20 TABLET, DELAYED RELEASE ORAL at 05:38

## 2022-05-22 RX ADMIN — Medication 650 MILLIGRAM(S): at 01:18

## 2022-05-22 RX ADMIN — TRAMADOL HYDROCHLORIDE 75 MILLIGRAM(S): 50 TABLET ORAL at 23:43

## 2022-05-22 RX ADMIN — Medication 1 GRAM(S): at 05:37

## 2022-05-22 RX ADMIN — TRAMADOL HYDROCHLORIDE 75 MILLIGRAM(S): 50 TABLET ORAL at 05:37

## 2022-05-22 RX ADMIN — ENOXAPARIN SODIUM 40 MILLIGRAM(S): 100 INJECTION SUBCUTANEOUS at 21:11

## 2022-05-22 RX ADMIN — Medication 650 MILLIGRAM(S): at 11:59

## 2022-05-22 RX ADMIN — Medication 650 MILLIGRAM(S): at 11:00

## 2022-05-22 RX ADMIN — DIPHENHYDRAMINE HYDROCHLORIDE AND LIDOCAINE HYDROCHLORIDE AND ALUMINUM HYDROXIDE AND MAGNESIUM HYDRO 10 MILLILITER(S): KIT at 17:03

## 2022-05-22 RX ADMIN — Medication 650 MILLIGRAM(S): at 17:01

## 2022-05-22 RX ADMIN — Medication 650 MILLIGRAM(S): at 17:56

## 2022-05-22 RX ADMIN — Medication 1 GRAM(S): at 23:13

## 2022-05-22 NOTE — PROGRESS NOTE ADULT - PROBLEM SELECTOR PLAN 4
-Continue with Amlodipine

## 2022-05-22 NOTE — PROGRESS NOTE ADULT - PROBLEM SELECTOR PLAN 1
CT persistent peripancreatic inflammatory changes and multiple pancreatic collections. Epigastric pain likely related to gastritis  as per Surgery , no acute intervention , pt cleared for dc   pt was evaluated by GI - no acute intervention   Advance GI was consulted as well as pain management
-Aggressive IV hydration with LR at 200 cc/hr  -NPO for now  -Pain control with Tylenol & Morphine PRN  -Monitor electrolytes

## 2022-05-22 NOTE — PROGRESS NOTE ADULT - PROBLEM SELECTOR PLAN 3
-Continue with Synthroid

## 2022-05-22 NOTE — PROGRESS NOTE ADULT - PROBLEM SELECTOR PROBLEM 2
Pancreatic pseudocyst

## 2022-05-23 ENCOUNTER — TRANSCRIPTION ENCOUNTER (OUTPATIENT)
Age: 57
End: 2022-05-23

## 2022-05-23 VITALS
SYSTOLIC BLOOD PRESSURE: 117 MMHG | OXYGEN SATURATION: 98 % | RESPIRATION RATE: 18 BRPM | HEART RATE: 75 BPM | DIASTOLIC BLOOD PRESSURE: 70 MMHG | TEMPERATURE: 98 F

## 2022-05-23 LAB
GLUCOSE BLDC GLUCOMTR-MCNC: 108 MG/DL — HIGH (ref 70–99)
GLUCOSE BLDC GLUCOMTR-MCNC: 135 MG/DL — HIGH (ref 70–99)
GLUCOSE BLDC GLUCOMTR-MCNC: 94 MG/DL — SIGNIFICANT CHANGE UP (ref 70–99)

## 2022-05-23 PROCEDURE — 83735 ASSAY OF MAGNESIUM: CPT

## 2022-05-23 PROCEDURE — 84100 ASSAY OF PHOSPHORUS: CPT

## 2022-05-23 PROCEDURE — 80053 COMPREHEN METABOLIC PANEL: CPT

## 2022-05-23 PROCEDURE — 83690 ASSAY OF LIPASE: CPT

## 2022-05-23 PROCEDURE — 93005 ELECTROCARDIOGRAM TRACING: CPT

## 2022-05-23 PROCEDURE — U0005: CPT

## 2022-05-23 PROCEDURE — 85025 COMPLETE CBC W/AUTO DIFF WBC: CPT

## 2022-05-23 PROCEDURE — 84484 ASSAY OF TROPONIN QUANT: CPT

## 2022-05-23 PROCEDURE — U0003: CPT

## 2022-05-23 PROCEDURE — 96374 THER/PROPH/DIAG INJ IV PUSH: CPT

## 2022-05-23 PROCEDURE — 74177 CT ABD & PELVIS W/CONTRAST: CPT | Mod: MA

## 2022-05-23 PROCEDURE — 71046 X-RAY EXAM CHEST 2 VIEWS: CPT

## 2022-05-23 PROCEDURE — 85027 COMPLETE CBC AUTOMATED: CPT

## 2022-05-23 PROCEDURE — 82962 GLUCOSE BLOOD TEST: CPT

## 2022-05-23 PROCEDURE — 99285 EMERGENCY DEPT VISIT HI MDM: CPT | Mod: 25

## 2022-05-23 PROCEDURE — 76705 ECHO EXAM OF ABDOMEN: CPT

## 2022-05-23 PROCEDURE — 36415 COLL VENOUS BLD VENIPUNCTURE: CPT

## 2022-05-23 RX ORDER — TRAMADOL HYDROCHLORIDE 50 MG/1
1 TABLET ORAL
Qty: 28 | Refills: 0
Start: 2022-05-23 | End: 2022-05-29

## 2022-05-23 RX ORDER — ACETAMINOPHEN 500 MG
2 TABLET ORAL
Qty: 0 | Refills: 0 | DISCHARGE

## 2022-05-23 RX ORDER — SUCRALFATE 1 G
1 TABLET ORAL
Qty: 120 | Refills: 0
Start: 2022-05-23 | End: 2022-06-21

## 2022-05-23 RX ADMIN — TRAMADOL HYDROCHLORIDE 75 MILLIGRAM(S): 50 TABLET ORAL at 06:15

## 2022-05-23 RX ADMIN — Medication 1 GRAM(S): at 12:21

## 2022-05-23 RX ADMIN — Medication 1 GRAM(S): at 05:45

## 2022-05-23 RX ADMIN — PANTOPRAZOLE SODIUM 40 MILLIGRAM(S): 20 TABLET, DELAYED RELEASE ORAL at 05:44

## 2022-05-23 RX ADMIN — DIPHENHYDRAMINE HYDROCHLORIDE AND LIDOCAINE HYDROCHLORIDE AND ALUMINUM HYDROXIDE AND MAGNESIUM HYDRO 10 MILLILITER(S): KIT at 05:44

## 2022-05-23 RX ADMIN — Medication 150 MICROGRAM(S): at 05:44

## 2022-05-23 RX ADMIN — Medication 650 MILLIGRAM(S): at 05:43

## 2022-05-23 RX ADMIN — TRAMADOL HYDROCHLORIDE 75 MILLIGRAM(S): 50 TABLET ORAL at 13:04

## 2022-05-23 RX ADMIN — Medication 650 MILLIGRAM(S): at 12:21

## 2022-05-23 RX ADMIN — Medication 650 MILLIGRAM(S): at 06:15

## 2022-05-23 RX ADMIN — TRAMADOL HYDROCHLORIDE 75 MILLIGRAM(S): 50 TABLET ORAL at 05:45

## 2022-05-23 RX ADMIN — AMLODIPINE BESYLATE 10 MILLIGRAM(S): 2.5 TABLET ORAL at 05:44

## 2022-05-23 NOTE — DISCHARGE NOTE NURSING/CASE MANAGEMENT/SOCIAL WORK - PATIENT PORTAL LINK FT
You can access the FollowMyHealth Patient Portal offered by [facility] by registering at the following website: [email or web address]. By joining [employer]’s FollowMyHealth portal, you will also be able to view your health information using other applications (apps) compatible with our system.

## 2022-05-23 NOTE — DISCHARGE NOTE PROVIDER - NSDCMRMEDTOKEN_GEN_ALL_CORE_FT
acetaminophen 500 mg oral tablet: 2 tab(s) orally , As Needed - 3)  amLODIPine 10 mg oral tablet: 1 tab(s) orally once a day  levothyroxine 150 mcg (0.15 mg) oral tablet: 1 tab(s) orally once a day.    Note: As per patient he takes 1 tablet of 175mcg daily, however pharmacy records 150mcg once a day  Protonix 40 mg oral delayed release tablet: 1 tab(s) orally once a day, As Needed

## 2022-05-23 NOTE — DISCHARGE NOTE NURSING/CASE MANAGEMENT/SOCIAL WORK - NSDCPEFALRISK_GEN_ALL_CORE
For information on Fall & Injury Prevention, visit: [email or web address] OR  [email or web address] OR  https://www.cdc.gov/steadi/patient.html

## 2022-05-23 NOTE — DISCHARGE NOTE PROVIDER - PROVIDER TOKENS
PROVIDER:[TOKEN:[[id number]]],PROVIDER:[TOKEN:[[id number]]],PROVIDER:[TOKEN:[[id number]]],PROVIDER:[TOKEN:[[id number]]],PROVIDER:[TOKEN:[[id number]]]

## 2022-05-23 NOTE — PROGRESS NOTE ADULT - NSPROGADDITIONALINFOA_GEN_ALL_CORE
I reviewed the overnight course of events on the unit, re-confirming the patient history. I discussed the care with the patient and their family. The plan of care was discussed with the ACP team and modifications were made to the notation where appropriate. Differential diagnosis and plan of care discussed with patient after the evaluation. Advanced care planning was discussed with patient and family.  Advanced care planning forms were reviewed and discussed.  Risks, benefits and alternatives of cardiac procedures were discussed in detail and all questions were answered. 35 minutes spent on total encounter of which more than fifty percent of the encounter was spent counseling and/or coordinating care by the attending physician.
discussed pts current clinical status , management plan in detail in length with pt   all questions/ concerns addressed   fair health code 53 min   discussed management plan with acp covering pt

## 2022-05-23 NOTE — PROGRESS NOTE ADULT - SUBJECTIVE AND OBJECTIVE BOX
TREE [id number]  56yMale  T(C): 36.9 (05-18-22 @ 15:00), Max: 37.2 (05-18-22 @ 10:20)  HR: 72 (05-18-22 @ 15:00) (68 - 75)  BP: 117/74 (05-18-22 @ 15:00) (108/70 - 125/83)  RR: 20 (05-18-22 @ 15:00) (16 - 20)  SpO2: 99% (05-18-22 @ 15:00) (98% - 100%)  Wt(kg): --  05-18 @ 07:01  -  05-18 @ 15:52  --------------------------------------------------------  IN: 1200 mL / OUT: 350 mL / NET: 850 mL          
    SUBJECTIVE / OVERNIGHT EVENTS:pt seen and examined, c/o abd pain     MEDICATIONS  (STANDING):  amLODIPine   Tablet 10 milliGRAM(s) Oral daily  enoxaparin Injectable 40 milliGRAM(s) SubCutaneous every 24 hours  lactated ringers. 1000 milliLiter(s) (200 mL/Hr) IV Continuous <Continuous>  levothyroxine 150 MICROGram(s) Oral daily  pantoprazole    Tablet 40 milliGRAM(s) Oral before breakfast    MEDICATIONS  (PRN):  acetaminophen     Tablet .. 650 milliGRAM(s) Oral every 6 hours PRN Temp greater or equal to 38C (100.4F), Mild Pain (1 - 3), Moderate Pain (4 - 6)  morphine  - Injectable 2 milliGRAM(s) IV Push every 4 hours PRN Severe Pain (7 - 10)    Vital Signs Last 24 Hrs  T(C): 37.4 (05-20-22 @ 00:35), Max: 37.8 (05-19-22 @ 20:30)  T(F): 99.3 (05-20-22 @ 00:35), Max: 100.1 (05-19-22 @ 20:30)  HR: 85 (05-20-22 @ 00:35) (63 - 85)  BP: 122/67 (05-19-22 @ 23:59) (111/67 - 147/76)  BP(mean): --  RR: 18 (05-20-22 @ 00:35) (18 - 18)  SpO2: 98% (05-20-22 @ 00:35) (96% - 100%)        Constitutional: No fever, fatigue  Skin: No rash.  Eyes: No recent vision problems or eye pain.  ENT: No congestion, ear pain, or sore throat.  Cardiovascular: No chest pain or palpation.  Respiratory: No cough, shortness of breath, congestion, or wheezing.  Gastrointestinal: + abdominal pain, nausea, vomiting, or diarrhea.  Genitourinary: No dysuria.  Musculoskeletal: No joint swelling.  Neurologic: No headache.    PHYSICAL EXAM:  GENERAL: NAD  EYES: EOMI, PERRLA  NECK: Supple, No JVD  CHEST/LUNG: dec breath sounds at bases  HEART:  S1 , S2 +  ABDOMEN: soft , bs+, tenderness on deep palpation +all over the abd , no guarding , no rebound  EXTREMITIES:  no edema  NEUROLOGY:alert awake      LABS:  05-19    135  |  99  |  8   ----------------------------<  55<L>  4.2   |  22  |  0.70    Ca    8.6      19 May 2022 07:14  Phos  3.0     05-18  Mg     1.9     05-18    TPro  5.8<L>  /  Alb  3.1<L>  /  TBili  0.5  /  DBili      /  AST  9<L>  /  ALT  5<L>  /  AlkPhos  59  05-19    Creatinine Trend: 0.70 <--, 0.76 <--, 0.77 <--                        7.8    4.52  )-----------( 277      ( 19 May 2022 07:15 )             25.7     Urine Studies:            LIVER FUNCTIONS - ( 19 May 2022 07:14 )  Alb: 3.1 g/dL / Pro: 5.8 g/dL / ALK PHOS: 59 U/L / ALT: 5 U/L / AST: 9 U/L / GGT: x                       RADIOLOGY & ADDITIONAL TESTS:    Imaging Personally Reviewed:    Consultant(s) Notes Reviewed:      Care Discussed with Consultants/Other Providers:  
  Carlos A Samayoa MD  Interventional Cardiology / Endovascular Specialist  [place] Office : [address] [place] [address] [place]. [address]  Tel:   [place] Office : [address] [facility] [place] [address]  Tel: [phone]    Subjective/Overnight events: Patient lying in bed comfortably. No acute distress. Denies chest pain, SOB or palpitations  	  MEDICATIONS:  amLODIPine   Tablet 10 milliGRAM(s) Oral daily  enoxaparin Injectable 40 milliGRAM(s) SubCutaneous every 24 hours        acetaminophen     Tablet .. 650 milliGRAM(s) Oral every 6 hours PRN  traMADol 50 milliGRAM(s) Oral every 6 hours    pantoprazole    Tablet 40 milliGRAM(s) Oral two times a day    levothyroxine 150 MICROGram(s) Oral daily    FIRST- Mouthwash  BLM 10 milliLiter(s) Swish and Swallow two times a day  lactated ringers. 1000 milliLiter(s) IV Continuous <Continuous>      PAST MEDICAL/SURGICAL HISTORY  PAST MEDICAL & SURGICAL HISTORY:  Hypothyroid      Hernia      Hypertension      Pancreatitis      History of back surgery          SOCIAL HISTORY: Substance Use (street drugs): ( x ) never used  (  ) other:    FAMILY HISTORY:  No pertinent family history in first degree relatives        REVIEW OF SYSTEMS:  CONSTITUTIONAL: No fever, weight loss, or fatigue  EYES: No eye pain, visual disturbances, or discharge  ENMT:  No difficulty hearing, tinnitus, vertigo; No sinus or throat pain  BREASTS: No pain, masses, or nipple discharge  GASTROINTESTINAL: No abdominal or epigastric pain. No nausea, vomiting, or hematemesis; No diarrhea or constipation. No melena or hematochezia.  GENITOURINARY: No dysuria, frequency, hematuria, or incontinence  NEUROLOGICAL: No headaches, memory loss, loss of strength, numbness, or tremors  ENDOCRINE: No heat or cold intolerance; No hair loss  MUSCULOSKELETAL: No joint pain or swelling; No muscle, back, or extremity pain  PSYCHIATRIC: No depression, anxiety, mood swings, or difficulty sleeping  HEME/LYMPH: No easy bruising, or bleeding gums  All others negative    PHYSICAL EXAM:  T(C): 37.1 (05-20-22 @ 17:02), Max: 37.8 (05-19-22 @ 20:30)  HR: 80 (05-20-22 @ 17:02) (80 - 85)  BP: 117/69 (05-20-22 @ 17:02) (112/76 - 147/76)  RR: 18 (05-20-22 @ 17:02) (18 - 18)  SpO2: 99% (05-20-22 @ 17:02) (98% - 99%)  Wt(kg): --  I&O's Summary    19 May 2022 07:01  -  20 May 2022 07:00  --------------------------------------------------------  IN: 0 mL / OUT: 3050 mL / NET: -3050 mL    20 May 2022 07:01  -  20 May 2022 19:18  --------------------------------------------------------  IN: 320 mL / OUT: 725 mL / NET: -405 mL      GENERAL: NAD  EYES: conjunctiva and sclera clear  ENMT: No tonsillar erythema, exudates, or enlargement   Cardiovascular: Normal S1 S2, No JVD, No murmurs, No edema  Respiratory: Lungs clear to auscultation	  Gastrointestinal:  Soft, Non-tender, + BS	  Extremities: No edema                                9.6    9.79  )-----------( 357      ( 20 May 2022 07:26 )             30.9     05-20    135  |  98  |  5<L>  ----------------------------<  67<L>  4.4   |  19<L>  |  0.79    Ca    9.5      20 May 2022 07:20    TPro  7.0  /  Alb  3.9  /  TBili  0.6  /  DBili  x   /  AST  10  /  ALT  <5<L>  /  AlkPhos  71  05-20    proBNP:   Lipid Profile:   HgA1c:   TSH:     Consultant(s) Notes Reviewed:  [x ] YES  [ ] NO    Care Discussed with Consultants/Other Providers [ x] YES  [ ] NO    Imaging Personally Reviewed independently:  [x] YES  [ ] NO    All labs, radiologic studies, vitals, orders and medications list reviewed. Patient is seen and examined at bedside. Case discussed with medical team.              
  Carlos A Samayoa MD  Interventional Cardiology / Endovascular Specialist  [place] Office : [address] [place] [address] [place] [address]  Tel:   [place] Office : [address] [facility] [place] [address]  Tel: [phone]    Subjective/Overnight events: Patient lying in bed comfortably. No acute distress. Denies chest pain, SOB or palpitations  	  MEDICATIONS:  amLODIPine   Tablet 10 milliGRAM(s) Oral daily  enoxaparin Injectable 40 milliGRAM(s) SubCutaneous every 24 hours        acetaminophen     Tablet .. 650 milliGRAM(s) Oral every 6 hours PRN  traMADol 75 milliGRAM(s) Oral every 6 hours    pantoprazole    Tablet 40 milliGRAM(s) Oral two times a day  sucralfate 1 Gram(s) Oral four times a day    levothyroxine 150 MICROGram(s) Oral daily    FIRST- Mouthwash  BLM 10 milliLiter(s) Swish and Swallow two times a day  lactated ringers. 1000 milliLiter(s) IV Continuous <Continuous>      PAST MEDICAL/SURGICAL HISTORY  PAST MEDICAL & SURGICAL HISTORY:  Hypothyroid      Hernia      Hypertension      Pancreatitis      History of back surgery          SOCIAL HISTORY: Substance Use (street drugs): ( x ) never used  (  ) other:    FAMILY HISTORY:  No pertinent family history in first degree relatives        REVIEW OF SYSTEMS:  CONSTITUTIONAL: No fever, weight loss, or fatigue  EYES: No eye pain, visual disturbances, or discharge  ENMT:  No difficulty hearing, tinnitus, vertigo; No sinus or throat pain  BREASTS: No pain, masses, or nipple discharge  GASTROINTESTINAL: c/o abdominal pain. No nausea, vomiting, or hematemesis; No diarrhea or constipation. No melena or hematochezia.  GENITOURINARY: No dysuria, frequency, hematuria, or incontinence  NEUROLOGICAL: No headaches, memory loss, loss of strength, numbness, or tremors  ENDOCRINE: No heat or cold intolerance; No hair loss  MUSCULOSKELETAL: No joint pain or swelling; No muscle, back, or extremity pain  PSYCHIATRIC: No depression, anxiety, mood swings, or difficulty sleeping  HEME/LYMPH: No easy bruising, or bleeding gums  All others negative    PHYSICAL EXAM:  T(C): 36.8 (05-21-22 @ 20:43), Max: 37.6 (05-21-22 @ 16:52)  HR: 69 (05-21-22 @ 20:43) (66 - 77)  BP: 101/66 (05-21-22 @ 20:43) (93/61 - 104/68)  RR: 18 (05-21-22 @ 20:43) (18 - 18)  SpO2: 97% (05-21-22 @ 20:43) (97% - 100%)  Wt(kg): --  I&O's Summary    20 May 2022 07:01  -  21 May 2022 07:00  --------------------------------------------------------  IN: 440 mL / OUT: 1075 mL / NET: -635 mL        GENERAL: NAD  EYES: conjunctiva and sclera clear  ENMT: No tonsillar erythema, exudates, or enlargement   Cardiovascular: Normal S1 S2, No JVD, No murmurs, No edema  Respiratory: Lungs clear to auscultation	  Gastrointestinal:  Soft, Non-tender, + BS	  Extremities: No edema                                9.6    9.79  )-----------( 357      ( 20 May 2022 07:26 )             30.9     05-21    138  |  101  |  6<L>  ----------------------------<  100<H>  4.2   |  26  |  0.73    Ca    9.4      21 May 2022 07:00    TPro  7.0  /  Alb  3.7  /  TBili  0.5  /  DBili  x   /  AST  9<L>  /  ALT  5<L>  /  AlkPhos  64  05-21    proBNP:   Lipid Profile:   HgA1c:   TSH:     Consultant(s) Notes Reviewed:  [x ] YES  [ ] NO    Care Discussed with Consultants/Other Providers [ x] YES  [ ] NO    Imaging Personally Reviewed independently:  [x] YES  [ ] NO    All labs, radiologic studies, vitals, orders and medications list reviewed. Patient is seen and examined at bedside. Case discussed with medical team.              
Carlos A Samayoa MD  Interventional Cardiology / Advance Heart Failure and Cardiac Transplant Specialist  [place] Office : [address] [place] [address] [facility] [address]  Tel:   [place] Office : [address] [facility] [place] [address]  Tel: [phone]       Pt is lying in bed comfortable not in distress, no chest pains no SOB no palpitations  	  MEDICATIONS:  amLODIPine   Tablet 10 milliGRAM(s) Oral daily  enoxaparin Injectable 40 milliGRAM(s) SubCutaneous every 24 hours        acetaminophen     Tablet .. 650 milliGRAM(s) Oral every 6 hours PRN    pantoprazole    Tablet 40 milliGRAM(s) Oral two times a day  sucralfate 1 Gram(s) Oral four times a day    levothyroxine 150 MICROGram(s) Oral daily    FIRST- Mouthwash  BLM 10 milliLiter(s) Swish and Swallow two times a day  lactated ringers. 1000 milliLiter(s) IV Continuous <Continuous>      PAST MEDICAL/SURGICAL HISTORY  PAST MEDICAL & SURGICAL HISTORY:  Hypothyroid      Hernia      Hypertension      Pancreatitis      History of back surgery          SOCIAL HISTORY: Substance Use (street drugs): ( x ) never used  (  ) other:    FAMILY HISTORY:  No pertinent family history in first degree relatives      PHYSICAL EXAM:  T(C): 36.7 (05-23-22 @ 13:37), Max: 36.9 (05-23-22 @ 09:22)  HR: 75 (05-23-22 @ 13:37) (69 - 75)  BP: 117/70 (05-23-22 @ 13:37) (105/66 - 117/70)  RR: 18 (05-23-22 @ 13:37) (18 - 18)  SpO2: 98% (05-23-22 @ 13:37) (98% - 100%)  Wt(kg): --  I&O's Summary    22 May 2022 07:01  -  23 May 2022 07:00  --------------------------------------------------------  IN: 760 mL / OUT: 125 mL / NET: 635 mL    23 May 2022 07:01  -  23 May 2022 22:04  --------------------------------------------------------  IN: 560 mL / OUT: 250 mL / NET: 310 mL          GENERAL: NAD  EYES:   PERRLA   ENMT:   Moist mucous membranes, Good dentition, No lesions  Cardiovascular: Normal S1 S2, No JVD, No murmurs, No edema  Respiratory: Lungs clear to auscultation	  Gastrointestinal:  Soft, Non-tender, + BS	  Extremities: no edema                      proBNP:   Lipid Profile:   HgA1c:   TSH:     Consultant(s) Notes Reviewed:  [x ] YES  [ ] NO    Care Discussed with Consultants/Other Providers [ x] YES  [ ] NO    Imaging Personally Reviewed independently:  [x] YES  [ ] NO    All labs, radiologic studies, vitals, orders and medications list reviewed. Patient is seen and examined at bedside. Case discussed with medical team.        
Patient is a 56y Male     Patient is a 56y old  Male who presents with a chief complaint of Epigastric Pain (19 May 2022 12:02)      HPI:  56M with PMHx of HTN, chronic pancreatitis (with result pancreatic pseudocysts), and hypothyroidism presenting with acute-on-chronic epigastric pain associated with decrease PO intake. Patient has a known history of pancreatic pseudocysts and was last admitted here in March 2021. At that time was seen by GI and recommended for eventual necrosectomy and cystgastrostomy. Patient followed up outpatient with our surgeon and recommended for surgical intervention, but then established care in [place]. He follows with Raza Blood at [facility] who referred him to Rafiq Escobar at [facility] who performed an EUS and drained one of his cysts on 5/2. Pathology not known to patient. Since then has been having severe pain in epigastric region with no radiation. No known alleviating factors, but aggravated by food and movement. When pain happens he feels like burping. He states feels similar to his pancreatic pain. He was pending surgical evaluation by Anderson Carson. In the ED had CT A&P showing persistent peripancreatic inflammatory change with pancreatic pseudocysts. In the ED was given Pepcid, Morphine, and Zofran. (17 May 2022 17:57)      PAST MEDICAL & SURGICAL HISTORY:  Hypothyroid      Hernia      Hypertension      Pancreatitis      History of back surgery          MEDICATIONS  (STANDING):  amLODIPine   Tablet 10 milliGRAM(s) Oral daily  enoxaparin Injectable 40 milliGRAM(s) SubCutaneous every 24 hours  lactated ringers. 1000 milliLiter(s) (200 mL/Hr) IV Continuous <Continuous>  levothyroxine 150 MICROGram(s) Oral daily  pantoprazole    Tablet 40 milliGRAM(s) Oral before breakfast      Allergies    No Known Allergies    Intolerances        SOCIAL HISTORY:  Denies ETOh,Smoking,     FAMILY HISTORY:  No pertinent family history in first degree relatives        REVIEW OF SYSTEMS:    CONSTITUTIONAL: No weakness, fevers or chills  EYES/ENT: No visual changes;  No vertigo or throat pain   NECK: No pain or stiffness  RESPIRATORY: No cough, wheezing, hemoptysis; No shortness of breath  CARDIOVASCULAR: No chest pain or palpitations  GASTROINTESTINAL: No abdominal or epigastric pain. No nausea, vomiting, or hematemesis; No diarrhea or constipation. No melena or hematochezia.  GENITOURINARY: No dysuria, frequency or hematuria  NEUROLOGICAL: No numbness or weakness  SKIN: No itching, burning, rashes, or lesions   All other review of systems is negative unless indicated above.    VITAL:  T(C): , Max: 37.8 (05-19-22 @ 20:30)  T(F): , Max: 100.1 (05-19-22 @ 20:30)  HR: 83 (05-20-22 @ 05:32)  BP: 117/64 (05-20-22 @ 05:32)  BP(mean): --  RR: 18 (05-20-22 @ 05:32)  SpO2: 98% (05-20-22 @ 05:32)  Wt(kg): --    I and O's:    05-18 @ 07:01  -  05-19 @ 07:00  --------------------------------------------------------  IN: 3600 mL / OUT: 2150 mL / NET: 1450 mL    05-19 @ 07:01  -  05-20 @ 06:30  --------------------------------------------------------  IN: 0 mL / OUT: 3050 mL / NET: -3050 mL          PHYSICAL EXAM:    Constitutional: NAD  HEENT: PERRLA,   Neck: No JVD  Respiratory: CTA B/L  Cardiovascular: S1 and S2  Gastrointestinal: BS+, soft, NT/ND  Extremities: No peripheral edema  Neurological: A/O x 3, no focal deficits  Psychiatric: Normal mood, normal affect  : No Lizama  Skin: No rashes  Access: Not applicable  Back: No CVA tenderness    LABS:                        7.8    4.52  )-----------( 277      ( 19 May 2022 07:15 )             25.7     05-19    135  |  99  |  8   ----------------------------<  55<L>  4.2   |  22  |  0.70    Ca    8.6      19 May 2022 07:14    TPro  5.8<L>  /  Alb  3.1<L>  /  TBili  0.5  /  DBili  x   /  AST  9<L>  /  ALT  5<L>  /  AlkPhos  59  05-19          RADIOLOGY & ADDITIONAL STUDIES:                          
Patient is a 56y Male     Patient is a 56y old  Male who presents with a chief complaint of Epigastric Pain (20 May 2022 19:17)      HPI:  56M with PMHx of HTN, chronic pancreatitis (with result pancreatic pseudocysts), and hypothyroidism presenting with acute-on-chronic epigastric pain associated with decrease PO intake. Patient has a known history of pancreatic pseudocysts and was last admitted here in March 2021. At that time was seen by GI and recommended for eventual necrosectomy and cystgastrostomy. Patient followed up outpatient with our surgeon and recommended for surgical intervention, but then established care in [place]. He follows with Raza Blood at [place] who referred him to Rafiq Escobar at [facility] who performed an EUS and drained one of his cysts on 5/2. Pathology not known to patient. Since then has been having severe pain in epigastric region with no radiation. No known alleviating factors, but aggravated by food and movement. When pain happens he feels like burping. He states feels similar to his pancreatic pain. He was pending surgical evaluation by Anderson Carson. In the ED had CT A&P showing persistent peripancreatic inflammatory change with pancreatic pseudocysts. In the ED was given Pepcid, Morphine, and Zofran. (17 May 2022 17:57)      PAST MEDICAL & SURGICAL HISTORY:  Hypothyroid      Hernia      Hypertension      Pancreatitis      History of back surgery          MEDICATIONS  (STANDING):  amLODIPine   Tablet 10 milliGRAM(s) Oral daily  enoxaparin Injectable 40 milliGRAM(s) SubCutaneous every 24 hours  FIRST- Mouthwash  BLM 10 milliLiter(s) Swish and Swallow two times a day  lactated ringers. 1000 milliLiter(s) (200 mL/Hr) IV Continuous <Continuous>  levothyroxine 150 MICROGram(s) Oral daily  pantoprazole    Tablet 40 milliGRAM(s) Oral two times a day  traMADol 50 milliGRAM(s) Oral every 6 hours      Allergies    No Known Allergies    Intolerances        SOCIAL HISTORY:  Denies ETOh,Smoking,     FAMILY HISTORY:  No pertinent family history in first degree relatives        REVIEW OF SYSTEMS:    CONSTITUTIONAL: No weakness, fevers or chills  EYES/ENT: No visual changes;  No vertigo or throat pain   NECK: No pain or stiffness  RESPIRATORY: No cough, wheezing, hemoptysis; No shortness of breath  CARDIOVASCULAR: No chest pain or palpitations  GASTROINTESTINAL: No abdominal or epigastric pain. No nausea, vomiting, or hematemesis; No diarrhea or constipation. No melena or hematochezia.  GENITOURINARY: No dysuria, frequency or hematuria  NEUROLOGICAL: No numbness or weakness  SKIN: No itching, burning, rashes, or lesions   All other review of systems is negative unless indicated above.    VITAL:  T(C): , Max: 37.7 (05-20-22 @ 20:54)  T(F): , Max: 99.9 (05-20-22 @ 20:54)  HR: 71 (05-21-22 @ 08:18)  BP: 101/64 (05-21-22 @ 08:18)  BP(mean): --  RR: 18 (05-21-22 @ 08:18)  SpO2: 98% (05-21-22 @ 08:18)  Wt(kg): --    I and O's:    05-19 @ 07:01  -  05-20 @ 07:00  --------------------------------------------------------  IN: 0 mL / OUT: 3050 mL / NET: -3050 mL    05-20 @ 07:01  -  05-21 @ 07:00  --------------------------------------------------------  IN: 440 mL / OUT: 1075 mL / NET: -635 mL          PHYSICAL EXAM:    Constitutional: NAD  HEENT: PERRLA,   Neck: No JVD  Respiratory: CTA B/L  Cardiovascular: S1 and S2  Gastrointestinal: BS+, soft, NT/ND  Extremities: No peripheral edema  Neurological: A/O x 3, no focal deficits  Psychiatric: Normal mood, normal affect  : No Lizama  Skin: No rashes  Access: Not applicable  Back: No CVA tenderness    LABS:                        9.6    9.79  )-----------( 357      ( 20 May 2022 07:26 )             30.9     05-21    138  |  101  |  6<L>  ----------------------------<  100<H>  4.2   |  26  |  0.73    Ca    9.4      21 May 2022 07:00    TPro  7.0  /  Alb  3.7  /  TBili  0.5  /  DBili  x   /  AST  9<L>  /  ALT  5<L>  /  AlkPhos  64  05-21          RADIOLOGY & ADDITIONAL STUDIES:                          
TREE [id number]  56yMale  T(C): 36.7 (05-23-22 @ 05:10), Max: 36.8 (05-22-22 @ 13:09)  HR: 74 (05-23-22 @ 05:10) (67 - 82)  BP: 115/75 (05-23-22 @ 05:10) (93/61 - 115/75)  RR: 18 (05-23-22 @ 05:10) (18 - 18)  SpO2: 100% (05-23-22 @ 05:10) (96% - 100%)  Wt(kg): --  05-22 @ 07:01  -  05-23 @ 07:00  --------------------------------------------------------  IN: 760 mL / OUT: 125 mL / NET: 635 mL      normal cephalic atraumatic  s1s2   clear to ascultation bilaterally  soft, non tender, non distended no guarding or rebound  no clubbing cyanosis or edema    
  Carlos A Samayoa MD  Interventional Cardiology / Endovascular Specialist  [place] Office : [address] [place] [address] [place]  Tel:   [place] Office : [address] [facility] [place] [address]  Tel: [phone]      Subjective/Overnight events: Patient lying in bed comfortably. No acute distress. Denies chest pain, SOB or palpitations  	  MEDICATIONS:  amLODIPine   Tablet 10 milliGRAM(s) Oral daily  enoxaparin Injectable 40 milliGRAM(s) SubCutaneous every 24 hours        acetaminophen     Tablet .. 650 milliGRAM(s) Oral every 6 hours PRN  morphine  - Injectable 2 milliGRAM(s) IV Push every 4 hours PRN    pantoprazole    Tablet 40 milliGRAM(s) Oral before breakfast    levothyroxine 150 MICROGram(s) Oral daily    lactated ringers. 1000 milliLiter(s) IV Continuous <Continuous>      PAST MEDICAL/SURGICAL HISTORY  PAST MEDICAL & SURGICAL HISTORY:  Hypothyroid      Hernia      Hypertension      Pancreatitis      History of back surgery          SOCIAL HISTORY: Substance Use (street drugs): ( x ) never used  (  ) other:    FAMILY HISTORY:  No pertinent family history in first degree relatives          PHYSICAL EXAM:  T(C): 36.7 (05-19-22 @ 09:40), Max: 37.1 (05-18-22 @ 17:02)  HR: 63 (05-19-22 @ 09:40) (60 - 72)  BP: 111/67 (05-19-22 @ 09:40) (111/62 - 121/68)  RR: 18 (05-19-22 @ 09:40) (18 - 20)  SpO2: 99% (05-19-22 @ 09:40) (96% - 99%)  Wt(kg): --  I&O's Summary    18 May 2022 07:01  -  19 May 2022 07:00  --------------------------------------------------------  IN: 3600 mL / OUT: 2150 mL / NET: 1450 mL    19 May 2022 07:01  -  19 May 2022 10:30  --------------------------------------------------------  IN: 0 mL / OUT: 450 mL / NET: -450 mL        GENERAL: NAD  EYES: conjunctiva and sclera clear  ENMT: No tonsillar erythema, exudates, or enlargement   Cardiovascular: Normal S1 S2, No JVD, No murmurs, No edema  Respiratory: Lungs clear to auscultation	  Gastrointestinal:  Soft, Non-tender, + BS	  Extremities: No edema                                    7.8    4.52  )-----------( 277      ( 19 May 2022 07:15 )             25.7     05-19    135  |  99  |  8   ----------------------------<  55<L>  4.2   |  22  |  0.70    Ca    8.6      19 May 2022 07:14  Phos  3.0     05-18  Mg     1.9     05-18    TPro  5.8<L>  /  Alb  3.1<L>  /  TBili  0.5  /  DBili  x   /  AST  9<L>  /  ALT  5<L>  /  AlkPhos  59  05-19    proBNP:   Lipid Profile:   HgA1c:   TSH:     Consultant(s) Notes Reviewed:  [x ] YES  [ ] NO    Care Discussed with Consultants/Other Providers [ x] YES  [ ] NO    Imaging Personally Reviewed independently:  [x] YES  [ ] NO    All labs, radiologic studies, vitals, orders and medications list reviewed. Patient is seen and examined at bedside. Case discussed with medical team.              
Patient is a 56y Male     Patient is a 56y old  Male who presents with a chief complaint of Epigastric Pain (18 May 2022 19:36)      HPI:  56M with PMHx of HTN, chronic pancreatitis (with result pancreatic pseudocysts), and hypothyroidism presenting with acute-on-chronic epigastric pain associated with decrease PO intake. Patient has a known history of pancreatic pseudocysts and was last admitted here in March 2021. At that time was seen by GI and recommended for eventual necrosectomy and cystgastrostomy. Patient followed up outpatient with our surgeon and recommended for surgical intervention, but then established care in [place]. He follows with Raza Blood at [place] who referred him to Rafiq Escobar at [facility] who performed an EUS and drained one of his cysts on 5/2. Pathology not known to patient. Since then has been having severe pain in epigastric region with no radiation. No known alleviating factors, but aggravated by food and movement. When pain happens he feels like burping. He states feels similar to his pancreatic pain. He was pending surgical evaluation by Anderson Carson. In the ED had CT A&P showing persistent peripancreatic inflammatory change with pancreatic pseudocysts. In the ED was given Pepcid, Morphine, and Zofran. (17 May 2022 17:57)      PAST MEDICAL & SURGICAL HISTORY:  Hypothyroid      Hernia      Hypertension      Pancreatitis      History of back surgery          MEDICATIONS  (STANDING):  amLODIPine   Tablet 10 milliGRAM(s) Oral daily  enoxaparin Injectable 40 milliGRAM(s) SubCutaneous every 24 hours  lactated ringers. 1000 milliLiter(s) (200 mL/Hr) IV Continuous <Continuous>  levothyroxine 150 MICROGram(s) Oral daily  pantoprazole    Tablet 40 milliGRAM(s) Oral before breakfast      Allergies    No Known Allergies    Intolerances        SOCIAL HISTORY:  Denies ETOh,Smoking,     FAMILY HISTORY:  No pertinent family history in first degree relatives        REVIEW OF SYSTEMS:    CONSTITUTIONAL: No weakness, fevers or chills  EYES/ENT: No visual changes;  No vertigo or throat pain   NECK: No pain or stiffness  RESPIRATORY: No cough, wheezing, hemoptysis; No shortness of breath  CARDIOVASCULAR: No chest pain or palpitations  GASTROINTESTINAL: No abdominal or epigastric pain. No nausea, vomiting, or hematemesis; No diarrhea or constipation. No melena or hematochezia.  GENITOURINARY: No dysuria, frequency or hematuria  NEUROLOGICAL: No numbness or weakness  SKIN: No itching, burning, rashes, or lesions   All other review of systems is negative unless indicated above.    VITAL:  T(C): , Max: 37.2 (05-18-22 @ 10:20)  T(F): , Max: 98.9 (05-18-22 @ 10:20)  HR: 63 (05-19-22 @ 04:35)  BP: 121/68 (05-19-22 @ 04:35)  BP(mean): --  RR: 18 (05-19-22 @ 04:35)  SpO2: 96% (05-19-22 @ 04:35)  Wt(kg): --    I and O's:    05-18 @ 07:01  -  05-19 @ 07:00  --------------------------------------------------------  IN: 3600 mL / OUT: 2150 mL / NET: 1450 mL          PHYSICAL EXAM:    Constitutional: NAD  HEENT: PERRLA,   Neck: No JVD  Respiratory: CTA B/L  Cardiovascular: S1 and S2  Gastrointestinal: BS+, soft, NT/ND  Extremities: No peripheral edema  Neurological: A/O x 3, no focal deficits  Psychiatric: Normal mood, normal affect  : No Lizama  Skin: No rashes  Access: Not applicable  Back: No CVA tenderness    LABS:                        8.7    6.34  )-----------( 318      ( 18 May 2022 06:20 )             28.4     05-18    140  |  101  |  11  ----------------------------<  65<L>  4.2   |  24  |  0.76    Ca    9.0      18 May 2022 06:20  Phos  3.0     05-18  Mg     1.9     05-18    TPro  6.5  /  Alb  3.7  /  TBili  0.4  /  DBili  x   /  AST  9<L>  /  ALT  <5<L>  /  AlkPhos  67  05-18          RADIOLOGY & ADDITIONAL STUDIES:                          
TREE [id number]  56yMale  T(C): 36.3 (05-22-22 @ 05:00), Max: 37.6 (05-21-22 @ 16:52)  HR: 65 (05-22-22 @ 05:00) (65 - 77)  BP: 101/61 (05-22-22 @ 05:00) (93/61 - 104/64)  RR: 18 (05-22-22 @ 05:00) (18 - 18)  SpO2: 100% (05-22-22 @ 05:00) (97% - 100%)  Wt(kg): --  05-21 @ 07:01  -  05-22 @ 07:00  --------------------------------------------------------  IN: 0 mL / OUT: 200 mL / NET: -200 mL      normal cephalic atraumatic  s1s2   clear to ascultation bilaterally  soft, non tender, non distended no guarding or rebound  no clubbing cyanosis or edema    
    SUBJECTIVE / OVERNIGHT EVENTS:pt seen and examined, c/o abd pain     MEDICATIONS  (STANDING):  amLODIPine   Tablet 10 milliGRAM(s) Oral daily  enoxaparin Injectable 40 milliGRAM(s) SubCutaneous every 24 hours  lactated ringers. 1000 milliLiter(s) (200 mL/Hr) IV Continuous <Continuous>  levothyroxine 150 MICROGram(s) Oral daily  pantoprazole    Tablet 40 milliGRAM(s) Oral before breakfast    MEDICATIONS  (PRN):  acetaminophen     Tablet .. 650 milliGRAM(s) Oral every 6 hours PRN Temp greater or equal to 38C (100.4F), Mild Pain (1 - 3), Moderate Pain (4 - 6)  morphine  - Injectable 2 milliGRAM(s) IV Push every 4 hours PRN Severe Pain (7 - 10)    T(C): 36.7 (05-19-22 @ 00:02), Max: 37.2 (05-18-22 @ 10:20)  HR: 60 (05-19-22 @ 00:02) (60 - 75)  BP: 111/62 (05-19-22 @ 00:02) (108/70 - 125/65)  RR: 18 (05-19-22 @ 00:02) (16 - 20)  SpO2: 97% (05-19-22 @ 00:02) (97% - 100%)    CAPILLARY BLOOD GLUCOSE        I&O's Summary    18 May 2022 07:01  -  19 May 2022 01:30  --------------------------------------------------------  IN: 1200 mL / OUT: 1250 mL / NET: -50 mL        Constitutional: No fever, fatigue  Skin: No rash.  Eyes: No recent vision problems or eye pain.  ENT: No congestion, ear pain, or sore throat.  Cardiovascular: No chest pain or palpation.  Respiratory: No cough, shortness of breath, congestion, or wheezing.  Gastrointestinal: + abdominal pain, nausea, vomiting, or diarrhea.  Genitourinary: No dysuria.  Musculoskeletal: No joint swelling.  Neurologic: No headache.    PHYSICAL EXAM:  GENERAL: NAD  EYES: EOMI, PERRLA  NECK: Supple, No JVD  CHEST/LUNG: dec breath sounds at bases  HEART:  S1 , S2 +  ABDOMEN: soft , bs+, tenderness on deep palpation +all over the abd , no guarding , no rebound  EXTREMITIES:  no edema  NEUROLOGY:alert awake      LABS:                        8.7    6.34  )-----------( 318      ( 18 May 2022 06:20 )             28.4     05-18    140  |  101  |  11  ----------------------------<  65<L>  4.2   |  24  |  0.76    Ca    9.0      18 May 2022 06:20  Phos  3.0     05-18  Mg     1.9     05-18    TPro  6.5  /  Alb  3.7  /  TBili  0.4  /  DBili  x   /  AST  9<L>  /  ALT  <5<L>  /  AlkPhos  67  05-18              RADIOLOGY & ADDITIONAL TESTS:    Imaging Personally Reviewed:    Consultant(s) Notes Reviewed:      Care Discussed with Consultants/Other Providers:  
  Carlos A Samayoa MD  Interventional Cardiology / Endovascular Specialist  [place] Office : [address] [place] [address] [address] [address]  Tel:   [place] Office : [address] [facility] [place] [address]  Tel: [phone]    Subjective/Overnight events: Patient lying in bed comfortably. No acute distress. Denies chest pain, SOB or palpitations  	  MEDICATIONS:  amLODIPine   Tablet 10 milliGRAM(s) Oral daily  enoxaparin Injectable 40 milliGRAM(s) SubCutaneous every 24 hours        acetaminophen     Tablet .. 650 milliGRAM(s) Oral every 6 hours PRN  traMADol 75 milliGRAM(s) Oral every 6 hours    pantoprazole    Tablet 40 milliGRAM(s) Oral two times a day  sucralfate 1 Gram(s) Oral four times a day    levothyroxine 150 MICROGram(s) Oral daily    FIRST- Mouthwash  BLM 10 milliLiter(s) Swish and Swallow two times a day  lactated ringers. 1000 milliLiter(s) IV Continuous <Continuous>      PAST MEDICAL/SURGICAL HISTORY  PAST MEDICAL & SURGICAL HISTORY:  Hypothyroid      Hernia      Hypertension      Pancreatitis      History of back surgery          SOCIAL HISTORY: Substance Use (street drugs): ( x ) never used  (  ) other:    FAMILY HISTORY:  No pertinent family history in first degree relatives        REVIEW OF SYSTEMS:  CONSTITUTIONAL: No fever, weight loss, or fatigue  EYES: No eye pain, visual disturbances, or discharge  ENMT:  No difficulty hearing, tinnitus, vertigo; No sinus or throat pain  BREASTS: No pain, masses, or nipple discharge  GASTROINTESTINAL: No abdominal or epigastric pain. No nausea, vomiting, or hematemesis; No diarrhea or constipation. No melena or hematochezia.  GENITOURINARY: No dysuria, frequency, hematuria, or incontinence  NEUROLOGICAL: No headaches, memory loss, loss of strength, numbness, or tremors  ENDOCRINE: No heat or cold intolerance; No hair loss  MUSCULOSKELETAL: No joint pain or swelling; No muscle, back, or extremity pain  PSYCHIATRIC: No depression, anxiety, mood swings, or difficulty sleeping  HEME/LYMPH: No easy bruising, or bleeding gums  All others negative    PHYSICAL EXAM:  T(C): 36.6 (05-22-22 @ 20:17), Max: 36.8 (05-22-22 @ 13:09)  HR: 68 (05-22-22 @ 20:17) (65 - 82)  BP: 99/66 (05-22-22 @ 20:17) (93/61 - 104/64)  RR: 18 (05-22-22 @ 20:17) (18 - 18)  SpO2: 96% (05-22-22 @ 20:17) (96% - 100%)  Wt(kg): --  I&O's Summary    21 May 2022 07:01  -  22 May 2022 07:00  --------------------------------------------------------  IN: 0 mL / OUT: 200 mL / NET: -200 mL    22 May 2022 07:01  -  22 May 2022 23:05  --------------------------------------------------------  IN: 760 mL / OUT: 0 mL / NET: 760 mL        GENERAL: NAD  EYES: conjunctiva and sclera clear  ENMT: No tonsillar erythema, exudates, or enlargement   Cardiovascular: Normal S1 S2, No JVD, No murmurs, No edema  Respiratory: Lungs clear to auscultation	  Gastrointestinal:  Soft, Non-tender, + BS	  Extremities: No edema              05-21    138  |  101  |  6<L>  ----------------------------<  100<H>  4.2   |  26  |  0.73    Ca    9.4      21 May 2022 07:00    TPro  7.0  /  Alb  3.7  /  TBili  0.5  /  DBili  x   /  AST  9<L>  /  ALT  5<L>  /  AlkPhos  64  05-21    proBNP:   Lipid Profile:   HgA1c:   TSH:     Consultant(s) Notes Reviewed:  [x ] YES  [ ] NO    Care Discussed with Consultants/Other Providers [ x] YES  [ ] NO    Imaging Personally Reviewed independently:  [x] YES  [ ] NO    All labs, radiologic studies, vitals, orders and medications list reviewed. Patient is seen and examined at bedside. Case discussed with medical team.              
    SUBJECTIVE / OVERNIGHT EVENTS:pt seen and examined, c/o abd pain     MEDICATIONS  (STANDING):  amLODIPine   Tablet 10 milliGRAM(s) Oral daily  enoxaparin Injectable 40 milliGRAM(s) SubCutaneous every 24 hours  FIRST- Mouthwash  BLM 10 milliLiter(s) Swish and Swallow two times a day  lactated ringers. 1000 milliLiter(s) (200 mL/Hr) IV Continuous <Continuous>  levothyroxine 150 MICROGram(s) Oral daily  pantoprazole    Tablet 40 milliGRAM(s) Oral two times a day  traMADol 50 milliGRAM(s) Oral every 6 hours    MEDICATIONS  (PRN):  acetaminophen     Tablet .. 650 milliGRAM(s) Oral every 6 hours PRN Temp greater or equal to 38C (100.4F), Mild Pain (1 - 3), Moderate Pain (4 - 6)    Vital Signs Last 24 Hrs  T(C): 36.7 (05-20-22 @ 23:53), Max: 37.7 (05-20-22 @ 20:54)  T(F): 98.1 (05-20-22 @ 23:53), Max: 99.9 (05-20-22 @ 20:54)  HR: 77 (05-20-22 @ 23:53) (77 - 88)  BP: 102/62 (05-20-22 @ 23:53) (102/62 - 121/70)  BP(mean): --  RR: 18 (05-20-22 @ 23:53) (18 - 18)  SpO2: 98% (05-20-22 @ 23:53) (98% - 99%)          Constitutional: No fever, fatigue  Skin: No rash.  Eyes: No recent vision problems or eye pain.  ENT: No congestion, ear pain, or sore throat.  Cardiovascular: No chest pain or palpation.  Respiratory: No cough, shortness of breath, congestion, or wheezing.  Gastrointestinal: + abdominal pain, nausea, vomiting, or diarrhea.  Genitourinary: No dysuria.  Musculoskeletal: No joint swelling.  Neurologic: No headache.    PHYSICAL EXAM:  GENERAL: NAD  EYES: EOMI, PERRLA  NECK: Supple, No JVD  CHEST/LUNG: dec breath sounds at bases  HEART:  S1 , S2 +  ABDOMEN: soft , bs+, tenderness on deep palpation +all over the abd , no guarding , no rebound  EXTREMITIES:  no edema  NEUROLOGY:alert awake      LABS:  05-20    135  |  98  |  5<L>  ----------------------------<  67<L>  4.4   |  19<L>  |  0.79    Ca    9.5      20 May 2022 07:20    TPro  7.0  /  Alb  3.9  /  TBili  0.6  /  DBili      /  AST  10  /  ALT  <5<L>  /  AlkPhos  71  05-20    Creatinine Trend: 0.79 <--, 0.70 <--, 0.76 <--, 0.77 <--                        9.6    9.79  )-----------( 357      ( 20 May 2022 07:26 )             30.9     Urine Studies:            LIVER FUNCTIONS - ( 20 May 2022 07:20 )  Alb: 3.9 g/dL / Pro: 7.0 g/dL / ALK PHOS: 71 U/L / ALT: <5 U/L / AST: 10 U/L / GGT: x                     LIVER FUNCTIONS - ( 19 May 2022 07:14 )  Alb: 3.1 g/dL / Pro: 5.8 g/dL / ALK PHOS: 59 U/L / ALT: 5 U/L / AST: 9 U/L / GGT: x                       RADIOLOGY & ADDITIONAL TESTS:    Imaging Personally Reviewed:    Consultant(s) Notes Reviewed:      Care Discussed with Consultants/Other Providers:  
    SUBJECTIVE / OVERNIGHT EVENTS:pt seen and examined,     MEDICATIONS  (STANDING):  amLODIPine   Tablet 10 milliGRAM(s) Oral daily  enoxaparin Injectable 40 milliGRAM(s) SubCutaneous every 24 hours  FIRST- Mouthwash  BLM 10 milliLiter(s) Swish and Swallow two times a day  lactated ringers. 1000 milliLiter(s) (200 mL/Hr) IV Continuous <Continuous>  levothyroxine 150 MICROGram(s) Oral daily  pantoprazole    Tablet 40 milliGRAM(s) Oral two times a day  sucralfate 1 Gram(s) Oral four times a day  traMADol 75 milliGRAM(s) Oral every 6 hours    MEDICATIONS  (PRN):  acetaminophen     Tablet .. 650 milliGRAM(s) Oral every 6 hours PRN Temp greater or equal to 38C (100.4F), Mild Pain (1 - 3), Moderate Pain (4 - 6)    Vital Signs Last 24 Hrs  T(C): 36.6 (05-22-22 @ 20:17), Max: 36.8 (05-22-22 @ 13:09)  T(F): 97.9 (05-22-22 @ 20:17), Max: 98.2 (05-22-22 @ 13:09)  HR: 68 (05-22-22 @ 20:17) (65 - 82)  BP: 99/66 (05-22-22 @ 20:17) (93/61 - 103/63)  BP(mean): --  RR: 18 (05-22-22 @ 20:17) (18 - 18)  SpO2: 96% (05-22-22 @ 20:17) (96% - 100%)          Constitutional: No fever, fatigue  Skin: No rash.  Eyes: No recent vision problems or eye pain.  ENT: No congestion, ear pain, or sore throat.  Cardiovascular: No chest pain or palpation.  Respiratory: No cough, shortness of breath, congestion, or wheezing.  Gastrointestinal: + abdominal pain, nausea, vomiting, or diarrhea.  Genitourinary: No dysuria.  Musculoskeletal: No joint swelling.  Neurologic: No headache.    PHYSICAL EXAM:  GENERAL: NAD  EYES: EOMI, PERRLA  NECK: Supple, No JVD  CHEST/LUNG: dec breath sounds at bases  HEART:  S1 , S2 +  ABDOMEN: soft , bs+, tenderness on deep palpation +all over the abd , no guarding , no rebound  EXTREMITIES:  no edema  NEUROLOGY:alert awake      LABS:  05-21    138  |  101  |  6<L>  ----------------------------<  100<H>  4.2   |  26  |  0.73    Ca    9.4      21 May 2022 07:00    TPro  7.0  /  Alb  3.7  /  TBili  0.5  /  DBili      /  AST  9<L>  /  ALT  5<L>  /  AlkPhos  64  05-21    Creatinine Trend: 0.73 <--, 0.79 <--, 0.70 <--, 0.76 <--, 0.77 <--    Urine Studies:            LIVER FUNCTIONS - ( 21 May 2022 07:00 )  Alb: 3.7 g/dL / Pro: 7.0 g/dL / ALK PHOS: 64 U/L / ALT: 5 U/L / AST: 9 U/L / GGT: x                 LIVER FUNCTIONS - ( 21 May 2022 07:00 )  Alb: 3.7 g/dL / Pro: 7.0 g/dL / ALK PHOS: 64 U/L / ALT: 5 U/L / AST: 9 U/L / GGT: x             Imaging Personally Reviewed:    Consultant(s) Notes Reviewed:      Care Discussed with Consultants/Other Providers:  
    SUBJECTIVE / OVERNIGHT EVENTS:pt seen and examined, c/o abd pain   pt verbally abusive towards medical staff stating no one is helping him with his medical condition except writer  asking to increase tramadol to 100 mg   pt was evaluated by surgery, gi , advance gi was repeatedly told by all consultants that no acute intervention at present medically / surgically nut pt insisting saying that someone has to do something with abd paiin    MEDICATIONS  (STANDING):  amLODIPine   Tablet 10 milliGRAM(s) Oral daily  enoxaparin Injectable 40 milliGRAM(s) SubCutaneous every 24 hours  FIRST- Mouthwash  BLM 10 milliLiter(s) Swish and Swallow two times a day  lactated ringers. 1000 milliLiter(s) (200 mL/Hr) IV Continuous <Continuous>  levothyroxine 150 MICROGram(s) Oral daily  pantoprazole    Tablet 40 milliGRAM(s) Oral two times a day  sucralfate 1 Gram(s) Oral four times a day  traMADol 75 milliGRAM(s) Oral every 6 hours    MEDICATIONS  (PRN):  acetaminophen     Tablet .. 650 milliGRAM(s) Oral every 6 hours PRN Temp greater or equal to 38C (100.4F), Mild Pain (1 - 3), Moderate Pain (4 - 6)    Vital Signs Last 24 Hrs  T(C): 36.7 (05-22-22 @ 00:30), Max: 37.6 (05-21-22 @ 16:52)  T(F): 98 (05-22-22 @ 00:30), Max: 99.6 (05-21-22 @ 16:52)  HR: 69 (05-22-22 @ 00:30) (66 - 77)  BP: 104/64 (05-22-22 @ 00:30) (93/61 - 104/68)  BP(mean): --  RR: 18 (05-22-22 @ 00:30) (18 - 18)  SpO2: 98% (05-22-22 @ 00:30) (97% - 100%)            Constitutional: No fever, fatigue  Skin: No rash.  Eyes: No recent vision problems or eye pain.  ENT: No congestion, ear pain, or sore throat.  Cardiovascular: No chest pain or palpation.  Respiratory: No cough, shortness of breath, congestion, or wheezing.  Gastrointestinal: + abdominal pain, nausea, vomiting, or diarrhea.  Genitourinary: No dysuria.  Musculoskeletal: No joint swelling.  Neurologic: No headache.    PHYSICAL EXAM:  GENERAL: NAD  EYES: EOMI, PERRLA  NECK: Supple, No JVD  CHEST/LUNG: dec breath sounds at bases  HEART:  S1 , S2 +  ABDOMEN: soft , bs+, tenderness on deep palpation +all over the abd , no guarding , no rebound  EXTREMITIES:  no edema  NEUROLOGY:alert awake      LABS:  05-21    138  |  101  |  6<L>  ----------------------------<  100<H>  4.2   |  26  |  0.73    Ca    9.4      21 May 2022 07:00    TPro  7.0  /  Alb  3.7  /  TBili  0.5  /  DBili      /  AST  9<L>  /  ALT  5<L>  /  AlkPhos  64  05-21    Creatinine Trend: 0.73 <--, 0.79 <--, 0.70 <--, 0.76 <--, 0.77 <--                        9.6    9.79  )-----------( 357      ( 20 May 2022 07:26 )             30.9     Urine Studies:            LIVER FUNCTIONS - ( 21 May 2022 07:00 )  Alb: 3.7 g/dL / Pro: 7.0 g/dL / ALK PHOS: 64 U/L / ALT: 5 U/L / AST: 9 U/L / GGT: x             Imaging Personally Reviewed:    Consultant(s) Notes Reviewed:      Care Discussed with Consultants/Other Providers:

## 2022-05-23 NOTE — PROGRESS NOTE ADULT - PROVIDER SPECIALTY LIST ADULT
Cardiology
Gastroenterology
Cardiology
Gastroenterology
Internal Medicine
Cardiology
Gastroenterology
Cardiology
Gastroenterology
Cardiology
Internal Medicine

## 2022-05-23 NOTE — DISCHARGE NOTE PROVIDER - HOSPITAL COURSE
56M with PMHx of HTN, chronic pancreatitis (with result pancreatic pseudocysts), and hypothyroidism presenting with acute-on-chronic epigastric pain associated with decrease PO intake. Occurred after he had outpatient EUS and pancreatic pseudocyst drainage on 5/2.  CT A&P showing persistent peripancreatic inflammatory change with pancreatic pseudocysts. Likely acute-on-chronic pancreatitis. Patient treated with fluid hydration and pain control. GI consulted and noted pt had EUS FNA as outpatient and can follow up with his outpatient providers. Pt tolerating po diet. The patient was seen and evaluated and deemed medically stable for discharge.

## 2022-05-23 NOTE — DISCHARGE NOTE PROVIDER - CARE PROVIDERS DIRECT ADDRESSES
[email or web address],[email or web address]

## 2022-05-23 NOTE — DISCHARGE NOTE PROVIDER - CARE PROVIDER_API CALL
Rodriguez oRcha (MD)  Gastroenterology; Internal Medicine  Division of Gastroenterology - [address] [facility], [address] [place] [address]  Phone: [phone]  Fax: [phone]  Follow Up Time:     Javan Nieves (DO)  Gastroenterology; Internal Medicine  [id number]  [place] [address]  Phone: [phone]  Fax: [phone]  Follow Up Time:     Carlos A Samayoa  CARDIOVASCULAR DISEASE  [address] [place] [address]  Phone: [phone]  Fax: [phone]  Follow Up Time:     Hal Agosto  CARDIOVASCULAR DISEASE  [address] [place] [address]  Phone: [phone]  Fax: ()-  Follow Up Time:     Ham Jacobson (DO)  EndocrinologyMetabDiabetes; Internal Medicine  [address] [place] [address]  Phone: [phone]  Fax: [phone]  Follow Up Time:

## 2022-05-23 NOTE — DISCHARGE NOTE NURSING/CASE MANAGEMENT/SOCIAL WORK - NSDCVIVACCINE_GEN_ALL_CORE_FT
COVID-19 vaccine, vector-nr, rS-Ad26, PF, 0.5 mL (Chapis); 12-Mar-2021 16:35; Darling Eisenberg (TARA); Chapis; [id number] (Exp. Date: 25-May-2021); IntraMuscular; Deltoid Left.; 0.5 milliLiter(s);

## 2022-05-23 NOTE — PROGRESS NOTE ADULT - REASON FOR ADMISSION
Epigastric Pain

## 2022-05-23 NOTE — DISCHARGE NOTE PROVIDER - NSDCCPCAREPLAN_GEN_ALL_CORE_FT
see BH note PRINCIPAL DISCHARGE DIAGNOSIS  Diagnosis: Pancreatitis  Assessment and Plan of Treatment: Pancreatitis is a condition that can cause severe belly pain. The pancreas is an organ that makes hormones and juices that help break down food. Pancreatitis is the term for when this organ gets irritated or swollen. Most people get over pancreatitis without any long-lasting effects. But a few people get very sick. There are many causes of pancreatitis. But most cases are caused by gallstones or alcohol abuse: Gallstones – Gallstones are hard lumps that form inside an organ called the gallbladder. Both the pancreas and the gallbladder drain into a single tube. If that tube gets clogged by a gallstone, neither of the organs can drain. When that happens, the fluids from both organs get backed up. That can cause pain. Alcohol abuse – People who drink too much alcohol for too long sometimes get alcohol-related pancreatitis. People with this form of pancreatitis usually start to feel pain 1 to 3 days after drinking a lot of alcohol or after they suddenly stop drinking. They usually also have nausea and vomiting. Pancreatitis is usually treated in the hospital. There, your doctor or nurse can give you fluids and pain medicines to help you feel better.        SECONDARY DISCHARGE DIAGNOSES  Diagnosis: Pancreatitis  Assessment and Plan of Treatment:

## 2022-05-23 NOTE — DISCHARGE NOTE PROVIDER - NSDCFUSCHEDAPPT_GEN_ALL_CORE_FT
Alek Kang  [facility]  [facility] [address] [facility]  Scheduled Appointment: 08/09/2022

## 2022-06-03 ENCOUNTER — APPOINTMENT (OUTPATIENT)
Dept: FAMILY MEDICINE | Facility: CLINIC | Age: 57
End: 2022-06-03
Payer: COMMERCIAL

## 2022-06-03 VITALS
DIASTOLIC BLOOD PRESSURE: 76 MMHG | HEART RATE: 66 BPM | OXYGEN SATURATION: 99 % | BODY MASS INDEX: 22.12 KG/M2 | WEIGHT: 158 LBS | TEMPERATURE: 97 F | RESPIRATION RATE: 18 BRPM | HEIGHT: 71 IN | SYSTOLIC BLOOD PRESSURE: 118 MMHG

## 2022-06-03 DIAGNOSIS — Z00.00 ENCOUNTER FOR GENERAL ADULT MEDICAL EXAMINATION W/OUT ABNORMAL FINDINGS: ICD-10-CM

## 2022-06-03 DIAGNOSIS — Z82.49 FAMILY HISTORY OF ISCHEMIC HEART DISEASE AND OTHER DISEASES OF THE CIRCULATORY SYSTEM: ICD-10-CM

## 2022-06-03 DIAGNOSIS — Z87.891 PERSONAL HISTORY OF NICOTINE DEPENDENCE: ICD-10-CM

## 2022-06-03 DIAGNOSIS — Z80.42 FAMILY HISTORY OF MALIGNANT NEOPLASM OF PROSTATE: ICD-10-CM

## 2022-06-03 PROCEDURE — 99203 OFFICE O/P NEW LOW 30 MIN: CPT | Mod: 25

## 2022-06-03 PROCEDURE — 36415 COLL VENOUS BLD VENIPUNCTURE: CPT

## 2022-06-03 RX ORDER — LEVOTHYROXINE SODIUM 0.12 MG/1
125 TABLET ORAL
Qty: 14 | Refills: 0 | Status: DISCONTINUED | COMMUNITY
Start: 2018-10-10 | End: 2022-06-03

## 2022-06-03 NOTE — REVIEW OF SYSTEMS
[Abdominal Pain] : abdominal pain [Heartburn] : heartburn [Negative] : Heme/Lymph [Nausea] : no nausea [Constipation] : no constipation [Diarrhea] : no diarrhea [Vomiting] : no vomiting [Melena] : no melena

## 2022-06-04 LAB
25(OH)D3 SERPL-MCNC: 26.1 NG/ML
ESTIMATED AVERAGE GLUCOSE: 103 MG/DL
FERRITIN SERPL-MCNC: 35 NG/ML
HBA1C MFR BLD HPLC: 5.2 %
IRON SATN MFR SERPL: 4 %
IRON SERPL-MCNC: 16 UG/DL
PSA SERPL-MCNC: 0.98 NG/ML
T3 SERPL-MCNC: 82 NG/DL
T3FREE SERPL-MCNC: 2.13 PG/ML
T4 FREE SERPL-MCNC: 1.5 NG/DL
T4 SERPL-MCNC: 8.6 UG/DL
TIBC SERPL-MCNC: 366 UG/DL
TSH SERPL-ACNC: 6.08 UIU/ML
UIBC SERPL-MCNC: 350 UG/DL
URATE SERPL-MCNC: 3.9 MG/DL
VIT B12 SERPL-MCNC: 524 PG/ML

## 2022-06-09 DIAGNOSIS — R07.9 CHEST PAIN, UNSPECIFIED: ICD-10-CM

## 2022-06-15 ENCOUNTER — RX RENEWAL (OUTPATIENT)
Age: 57
End: 2022-06-15

## 2022-06-15 ENCOUNTER — APPOINTMENT (OUTPATIENT)
Dept: FAMILY MEDICINE | Facility: CLINIC | Age: 57
End: 2022-06-15
Payer: COMMERCIAL

## 2022-06-15 PROCEDURE — 99213 OFFICE O/P EST LOW 20 MIN: CPT

## 2022-06-15 RX ORDER — OMEPRAZOLE 40 MG/1
40 CAPSULE, DELAYED RELEASE ORAL
Qty: 30 | Refills: 0 | Status: DISCONTINUED | COMMUNITY
Start: 2018-09-14 | End: 2022-06-15

## 2022-06-15 RX ORDER — LEVOFLOXACIN 500 MG/1
500 TABLET, FILM COATED ORAL DAILY
Qty: 6 | Refills: 0 | Status: DISCONTINUED | COMMUNITY
Start: 2020-08-26 | End: 2022-06-15

## 2022-06-15 NOTE — HISTORY OF PRESENT ILLNESS
[de-identified] : TSH was 6.08, vitamin D 26.1, ferritin 35, iron 16, % iron sat 4. Pt was recalled back for abnormal test reports. Reports were reviewed with pt in details. Other blood tests came back wnl. \par

## 2022-07-01 ENCOUNTER — APPOINTMENT (OUTPATIENT)
Dept: GASTROENTEROLOGY | Facility: CLINIC | Age: 57
End: 2022-07-01

## 2022-07-13 ENCOUNTER — APPOINTMENT (OUTPATIENT)
Dept: FAMILY MEDICINE | Facility: CLINIC | Age: 57
End: 2022-07-13

## 2022-07-13 VITALS
DIASTOLIC BLOOD PRESSURE: 84 MMHG | WEIGHT: 157 LBS | HEIGHT: 71 IN | RESPIRATION RATE: 18 BRPM | TEMPERATURE: 97.8 F | HEART RATE: 93 BPM | BODY MASS INDEX: 21.98 KG/M2 | OXYGEN SATURATION: 99 % | SYSTOLIC BLOOD PRESSURE: 130 MMHG

## 2022-07-13 DIAGNOSIS — R19.09 OTHER INTRA-ABDOMINAL AND PELVIC SWELLING, MASS AND LUMP: ICD-10-CM

## 2022-07-13 LAB
T3 SERPL-MCNC: 92 NG/DL
T3FREE SERPL-MCNC: 2.5 PG/ML
T4 FREE SERPL-MCNC: 1.7 NG/DL
T4 SERPL-MCNC: 9.4 UG/DL
TSH SERPL-ACNC: 4.73 UIU/ML

## 2022-07-13 PROCEDURE — 99212 OFFICE O/P EST SF 10 MIN: CPT | Mod: 25

## 2022-07-13 PROCEDURE — 36415 COLL VENOUS BLD VENIPUNCTURE: CPT

## 2022-07-13 NOTE — HISTORY OF PRESENT ILLNESS
[de-identified] : Pt is back to office for thyroid function test. Pt has been taking levothyroxine 150 ug daily, not missing any pill.. \par Pt complained sometimes he noticed a big swelling at right groin area, not going down to his right scrotum area on and off when he stood up or carried heavy things. No problem now.

## 2022-07-19 ENCOUNTER — APPOINTMENT (OUTPATIENT)
Dept: GASTROENTEROLOGY | Facility: CLINIC | Age: 57
End: 2022-07-19

## 2022-07-19 VITALS
RESPIRATION RATE: 18 BRPM | SYSTOLIC BLOOD PRESSURE: 125 MMHG | BODY MASS INDEX: 21.98 KG/M2 | HEIGHT: 71 IN | TEMPERATURE: 98 F | OXYGEN SATURATION: 99 % | DIASTOLIC BLOOD PRESSURE: 80 MMHG | HEART RATE: 93 BPM | WEIGHT: 157 LBS

## 2022-07-19 PROCEDURE — 99214 OFFICE O/P EST MOD 30 MIN: CPT

## 2022-07-20 ENCOUNTER — APPOINTMENT (OUTPATIENT)
Dept: FAMILY MEDICINE | Facility: CLINIC | Age: 57
End: 2022-07-20

## 2022-07-20 PROCEDURE — 99441: CPT

## 2022-07-20 NOTE — HISTORY OF PRESENT ILLNESS
[Home] : at home, [unfilled] , at the time of the visit. [Medical Office: ([facility])___] : at the medical office located in  [Verbal consent obtained from patient] : the patient, [unfilled] [de-identified] : TSH was 4.73. Total and free T3 and T4 are wnl. Pt has been taking levothyroxine 150 ug, 1 daily fasting in the morning for 3-4 weeks before blood tests. Reports were reviewed with pt in details. \par

## 2022-07-20 NOTE — PHYSICAL EXAM
[General Appearance - Alert] : alert [General Appearance - In No Acute Distress] : in no acute distress [Sclera] : the sclera and conjunctiva were normal [Auscultation Breath Sounds / Voice Sounds] : lungs were clear to auscultation bilaterally [Heart Rate And Rhythm] : heart rate was normal and rhythm regular [Abdomen Soft] : soft [] : no hepato-splenomegaly [Abdomen Mass (___ Cm)] : no abdominal mass palpated [Epigastric] : in the epigastric area [Abnormal Walk] : normal gait [Affect] : the affect was normal [FreeTextEntry1] : No confusion

## 2022-07-20 NOTE — HISTORY OF PRESENT ILLNESS
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  \par \par Was evaluated at [facility] by my partner Dr. Kay in 2019 for EUS guided cyst gastrostomy, however this was not performed as there were overlying vessels and the collections were relatively small.  \par \par He subsequently saw Dr. Theo Duke, pancreas surgeon, and was planned for pancreatic surgery, however patient chose not to go through with it because of scheduling difficulties.\par \par Patient's receives GI care in [place], was referred by Dr. Ayala SIMS to Dr. Rafiq Escobar / [facility] who, who performed EUS guided drainage in early May 2022 (Records of this are not available to me).  He was subsequently hospitalized fat [facility] or epigastric/left upper quadrant abdominal pain.  \par \par Currently\par Epigastric / LUQ pain\par Not affected by food.\par Has weight loss 178->154 lb\par Decreased appetite.\par PPI helps\par No f/c/s/n/v/diarrhea/melena/jaundice.\par Odynophagia:  since May procedure\par \par Labs from a hospitalization demonstrate no leukocytosis.  Hemoglobin between 8 and 9.  LFTs were not elevated.  Lipase was minimally elevated at 79.\par CT abd/pelvis 5/17/22:\par LIVER: Within normal limits.\par BILE DUCTS: Normal caliber.\par GALLBLADDER: Within normal limits.\par SPLEEN: Few tiny residual hypodensities/chronic infarcts.\par PANCREAS: Peripancreatic inflammatory changes decreased as compared to \par prior CT 3/10/2021. Uncinate process collection is increased in size \par measuring 2.4 x 2.2 cm (2, 46), previously 2.4 x 1.8 cm. Increased \par collection in the pancreatic head measuring 3.6 x 3.2 cm (2, 42). New 1.1 \par cm collection in the pancreatic body (2, 34). Collection extending from \par the pancreatic tail to the posterior stomach and splenic hilum is \par decreased measuring 4.1 x 3.1 cm (2, 25), previously 5.1 x 4.5 cm. \par Previously noted subphrenic collections are resolved. Wall thickening of \par the proximal stomach with a new 1.6 x 1.0 cm intramural collection \par anteriorly (2, 18).\par ADRENALS: Within normal limits.\par KIDNEYS/URETERS: Within normal limits.\par \par BLADDER: Within normal limits.\par REPRODUCTIVE ORGANS: Prostate within normal limits.\par \par BOWEL: No bowel obstruction. Appendix is normal.\par PERITONEUM: Trace pelvic ascites and left subphrenic fluid.\par VESSELS: Atherosclerotic changes. Chronic occlusion of the splenic vein \par with associated collateral vessels. Uncinate process cyst exerting mass \par effect on the SMV which is otherwise patent. Portal vein is patent.\par RETROPERITONEUM/LYMPH NODES: Upper abdominal lymphadenopathy, mildly \par increased. For reference, gastrohepatic node measures 3.0 x 2.1 cm, \par previously 2.6 x 1.7 cm. Small retroperitoneal lymph nodes are also noted.\par ABDOMINAL WALL: Within normal limits.\par BONES: Within normal limits.\par \par IMPRESSION:\par Persistent peripancreatic inflammatory change, decreased from prior CT \par 3/10/2021.\par \par New and increased pancreatic collections/pseudocysts and new small \par gastric intramural collection.\par \par Left upper quadrant collection is decreased and left subphrenic \par collections are resolved.\par \par Last colonoscopy:  within 1-2 years. January\par Dr. Garcia Person (GI) 9/11 related matters, [phone] Elena  \par \par \par

## 2022-07-20 NOTE — ASSESSMENT
[FreeTextEntry1] : Impression:\par Chronic pancreatitis / pseudocyst\par Alcohol induced chronic pancreatitis.\par Last alcohol intake November 2021\par \par Recommendations:\par \par CT abdomen/pancreas protocol\par We will discuss care in multidisciplinary [facility] conference.\par

## 2022-07-20 NOTE — ASSESSMENT
[FreeTextEntry1] : Impression:\par Chronic pancreatitis / pseudocyst\par Alcohol induced chronic pancreatitis.\par Last alcohol intake November 2021\par \par Recommendations:\par \par CT abdomen/pancreas protocol\par We will discuss care in multidisciplinary [facility] conference.\par

## 2022-07-20 NOTE — CONSULT LETTER
[Dear  ___] : Dear  [unfilled], [Consult Letter:] : I had the pleasure of evaluating your patient, [unfilled]. [Please see my note below.] : Please see my note below. [Consult Closing:] : Thank you very much for allowing me to participate in the care of this patient.  If you have any questions, please do not hesitate to contact me. [Sincerely,] : Sincerely, [DrJonathan  ___] : Dr. FANG [FreeTextEntry3] : Rodriguez Rocha MD, MPH, AXEL, LEEANNA\par Chief of Clinical Quality in Gastroenterology, [facility]\par Associate Chief of Gastroenterology, [facility]\par Interim Director of Endoscopy, [facility]\par Director of Endoscopic Ultrasound, [facility]\par [address], [address]\par [facility], [address]\par 24 hours [phone]\par \par

## 2022-07-20 NOTE — CONSULT LETTER
[Dear  ___] : Dear  [unfilled], [Consult Letter:] : I had the pleasure of evaluating your patient, [unfilled]. [Please see my note below.] : Please see my note below. [Consult Closing:] : Thank you very much for allowing me to participate in the care of this patient.  If you have any questions, please do not hesitate to contact me. [Sincerely,] : Sincerely, [DrJonathan  ___] : Dr. FANG [FreeTextEntry3] : Rodriguez Rocha MD, MPH, AXEL, LEEANNA\par Chief of Clinical Quality in Gastroenterology, [facility]\par Associate Chief of Gastroenterology, [facility]\par Interim Director of Endoscopy, [facility]\par Director of Endoscopic Ultrasound, [facility]\par [address], [address]\par [facility], [address]\par 24 hours [phone]\par \par

## 2022-07-20 NOTE — HISTORY OF PRESENT ILLNESS
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  \par \par Was evaluated at [facility] by my partner Dr. Kay in 2019 for EUS guided cyst gastrostomy, however this was not performed as there were overlying vessels and the collections were relatively small.  \par \par He subsequently saw Dr. Theo Duke, pancreas surgeon, and was planned for pancreatic surgery, however patient chose not to go through with it because of scheduling difficulties.\par \par Patient's receives GI care in [place], was referred by Dr. Ayala SIMS to Dr. Rafiq Escobar / [facility] who, who performed EUS guided drainage in early May 2022 (Records of this are not available to me).  He was subsequently hospitalized fat [facility] or epigastric/left upper quadrant abdominal pain.  \par \par Currently\par Epigastric / LUQ pain\par Not affected by food.\par Has weight loss 178->154 lb\par Decreased appetite.\par PPI helps\par No f/c/s/n/v/diarrhea/melena/jaundice.\par Odynophagia:  since May procedure\par \par Labs from a hospitalization demonstrate no leukocytosis.  Hemoglobin between 8 and 9.  LFTs were not elevated.  Lipase was minimally elevated at 79.\par CT abd/pelvis 5/17/22:\par LIVER: Within normal limits.\par BILE DUCTS: Normal caliber.\par GALLBLADDER: Within normal limits.\par SPLEEN: Few tiny residual hypodensities/chronic infarcts.\par PANCREAS: Peripancreatic inflammatory changes decreased as compared to \par prior CT 3/10/2021. Uncinate process collection is increased in size \par measuring 2.4 x 2.2 cm (2, 46), previously 2.4 x 1.8 cm. Increased \par collection in the pancreatic head measuring 3.6 x 3.2 cm (2, 42). New 1.1 \par cm collection in the pancreatic body (2, 34). Collection extending from \par the pancreatic tail to the posterior stomach and splenic hilum is \par decreased measuring 4.1 x 3.1 cm (2, 25), previously 5.1 x 4.5 cm. \par Previously noted subphrenic collections are resolved. Wall thickening of \par the proximal stomach with a new 1.6 x 1.0 cm intramural collection \par anteriorly (2, 18).\par ADRENALS: Within normal limits.\par KIDNEYS/URETERS: Within normal limits.\par \par BLADDER: Within normal limits.\par REPRODUCTIVE ORGANS: Prostate within normal limits.\par \par BOWEL: No bowel obstruction. Appendix is normal.\par PERITONEUM: Trace pelvic ascites and left subphrenic fluid.\par VESSELS: Atherosclerotic changes. Chronic occlusion of the splenic vein \par with associated collateral vessels. Uncinate process cyst exerting mass \par effect on the SMV which is otherwise patent. Portal vein is patent.\par RETROPERITONEUM/LYMPH NODES: Upper abdominal lymphadenopathy, mildly \par increased. For reference, gastrohepatic node measures 3.0 x 2.1 cm, \par previously 2.6 x 1.7 cm. Small retroperitoneal lymph nodes are also noted.\par ABDOMINAL WALL: Within normal limits.\par BONES: Within normal limits.\par \par IMPRESSION:\par Persistent peripancreatic inflammatory change, decreased from prior CT \par 3/10/2021.\par \par New and increased pancreatic collections/pseudocysts and new small \par gastric intramural collection.\par \par Left upper quadrant collection is decreased and left subphrenic \par collections are resolved.\par \par Last colonoscopy:  within 1-2 years. January\par Dr. Garcia Person (GI) 9/11 related matters, [phone] Elena  \par \par \par

## 2022-08-03 ENCOUNTER — APPOINTMENT (OUTPATIENT)
Dept: CT IMAGING | Facility: IMAGING CENTER | Age: 57
End: 2022-08-03

## 2022-08-03 DIAGNOSIS — M54.2 CERVICALGIA: ICD-10-CM

## 2022-08-08 NOTE — DATA REVIEWED
[FreeTextEntry1] : ACC: [id number] EXAM: CT ABDOMEN AND PELVIS IC\par \par PROCEDURE DATE: 05/17/2022\par \par \par \par INTERPRETATION: CLINICAL INFORMATION: Epigastric abdominal pain.\par \par COMPARISON: CT abdomen pelvis 3/10/2021, 9/15/2020. MR abdomen 10/30/2020.\par \par CONTRAST/COMPLICATIONS:\par IV Contrast: Omnipaque 350 70 cc administered\par Oral Contrast: None\par Complications: None reported\par \par PROCEDURE:\par CT of the Abdomen and Pelvis was performed.\par Sagittal and coronal reformats were performed.\par \par FINDINGS:\par LOWER CHEST: Linear atelectasis in the left lower lobe.\par \par LIVER: Within normal limits.\par BILE DUCTS: Normal caliber.\par GALLBLADDER: Within normal limits.\par SPLEEN: Few tiny residual hypodensities/chronic infarcts.\par PANCREAS: Peripancreatic inflammatory changes decreased as compared to prior CT 3/10/2021. Uncinate process collection is increased in size measuring 2.4 x 2.2 cm (2, 46), previously 2.4 x 1.8 cm. Increased collection in the pancreatic head measuring 3.6 x 3.2 cm (2, 42). New 1.1 cm collection in the pancreatic body (2, 34). Collection extending from the pancreatic tail to the posterior stomach and splenic hilum is decreased measuring 4.1 x 3.1 cm (2, 25), previously 5.1 x 4.5 cm. Previously noted subphrenic collections are resolved. Wall thickening of the proximal stomach with a new 1.6 x 1.0 cm intramural collection anteriorly (2, 18).\par ADRENALS: Within normal limits.\par KIDNEYS/URETERS: Within normal limits.\par \par BLADDER: Within normal limits.\par REPRODUCTIVE ORGANS: Prostate within normal limits.\par \par BOWEL: No bowel obstruction. Appendix is normal.\par PERITONEUM: Trace pelvic ascites and left subphrenic fluid.\par VESSELS: Atherosclerotic changes. Chronic occlusion of the splenic vein with associated collateral vessels. Uncinate process cyst exerting mass effect on the SMV which is otherwise patent. Portal vein is patent.\par RETROPERITONEUM/LYMPH NODES: Upper abdominal lymphadenopathy, mildly increased. For reference, gastrohepatic node measures 3.0 x 2.1 cm, previously 2.6 x 1.7 cm. Small retroperitoneal lymph nodes are also noted.\par ABDOMINAL WALL: Within normal limits.\par BONES: Within normal limits.\par \par IMPRESSION:\par Persistent peripancreatic inflammatory change, decreased from prior CT 3/10/2021.\par \par New and increased pancreatic collections/pseudocysts and new small gastric intramural collection.\par \par Left upper quadrant collection is decreased and left subphrenic collections are resolved.\par \par --- End of Report ---\par \par \par ORTIZ ROBBINS MD; Attending Radiologist\par This document has been electronically signed. May 17 2022 2:47PM

## 2022-08-09 ENCOUNTER — APPOINTMENT (OUTPATIENT)
Dept: PAIN MANAGEMENT | Facility: CLINIC | Age: 57
End: 2022-08-09

## 2022-08-09 ENCOUNTER — APPOINTMENT (OUTPATIENT)
Dept: SURGERY | Facility: CLINIC | Age: 57
End: 2022-08-09

## 2022-08-09 VITALS
WEIGHT: 157 LBS | DIASTOLIC BLOOD PRESSURE: 79 MMHG | SYSTOLIC BLOOD PRESSURE: 120 MMHG | HEART RATE: 103 BPM | HEIGHT: 71 IN | BODY MASS INDEX: 21.98 KG/M2

## 2022-08-09 VITALS — TEMPERATURE: 96.7 F

## 2022-08-09 RX ORDER — PANTOPRAZOLE 40 MG/1
40 TABLET, DELAYED RELEASE ORAL DAILY
Qty: 90 | Refills: 1 | Status: COMPLETED | COMMUNITY
Start: 2022-06-03 | End: 2022-08-09

## 2022-08-11 ENCOUNTER — OUTPATIENT (OUTPATIENT)
Dept: OUTPATIENT SERVICES | Facility: HOSPITAL | Age: 57
LOS: 1 days | End: 2022-08-11
Payer: COMMERCIAL

## 2022-08-11 ENCOUNTER — NON-APPOINTMENT (OUTPATIENT)
Age: 57
End: 2022-08-11

## 2022-08-11 ENCOUNTER — APPOINTMENT (OUTPATIENT)
Dept: CT IMAGING | Facility: IMAGING CENTER | Age: 57
End: 2022-08-11

## 2022-08-11 DIAGNOSIS — K86.3 PSEUDOCYST OF PANCREAS: ICD-10-CM

## 2022-08-11 DIAGNOSIS — Z98.890 OTHER SPECIFIED POSTPROCEDURAL STATES: Chronic | ICD-10-CM

## 2022-08-11 PROCEDURE — 74160 CT ABDOMEN W/CONTRAST: CPT | Mod: 26,MH

## 2022-08-11 PROCEDURE — 74160 CT ABDOMEN W/CONTRAST: CPT

## 2022-09-02 ENCOUNTER — APPOINTMENT (OUTPATIENT)
Dept: UROLOGY | Facility: CLINIC | Age: 57
End: 2022-09-02

## 2022-09-02 VITALS
SYSTOLIC BLOOD PRESSURE: 129 MMHG | BODY MASS INDEX: 21.28 KG/M2 | RESPIRATION RATE: 17 BRPM | WEIGHT: 152 LBS | HEIGHT: 71 IN | OXYGEN SATURATION: 98 % | DIASTOLIC BLOOD PRESSURE: 82 MMHG | HEART RATE: 93 BPM

## 2022-09-02 DIAGNOSIS — K40.90 UNILATERAL INGUINAL HERNIA, W/OUT OBSTRUCTION OR GANGRENE, NOT SPECIFIED AS RECURRENT: ICD-10-CM

## 2022-09-02 PROCEDURE — 99203 OFFICE O/P NEW LOW 30 MIN: CPT
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
## 2022-09-02 NOTE — PHYSICAL EXAM
[General Appearance - Well Developed] : well developed [General Appearance - Well Nourished] : well nourished [Heart Rate And Rhythm] : Heart rate and rhythm were normal [] : no respiratory distress [Respiration, Rhythm And Depth] : normal respiratory rhythm and effort [Bowel Sounds] : normal bowel sounds [Abdomen Soft] : soft [Normal Station and Gait] : the gait and station were normal for the patient's age [Skin Color & Pigmentation] : normal skin color and pigmentation [Skin Turgor] : supple [No Focal Deficits] : no focal deficits [Oriented To Time, Place, And Person] : oriented to person, place, and time [Not Anxious] : not anxious [FreeTextEntry1] : large hernia defect through R external ring, mild defect on L side as well

## 2022-09-02 NOTE — REVIEW OF SYSTEMS
[Recent Weight Loss (___ Lbs)] : recent [unfilled] ~Ulb weight loss [Abdominal Pain] : abdominal pain [see HPI] : see HPI [Negative] : Gastrointestinal

## 2022-09-08 ENCOUNTER — APPOINTMENT (OUTPATIENT)
Dept: CT IMAGING | Facility: IMAGING CENTER | Age: 57
End: 2022-09-08

## 2022-09-09 ENCOUNTER — RESULT REVIEW (OUTPATIENT)
Age: 57
End: 2022-09-09

## 2022-09-10 ENCOUNTER — APPOINTMENT (OUTPATIENT)
Dept: CT IMAGING | Facility: IMAGING CENTER | Age: 57
End: 2022-09-10

## 2022-09-10 ENCOUNTER — OUTPATIENT (OUTPATIENT)
Dept: OUTPATIENT SERVICES | Facility: HOSPITAL | Age: 57
LOS: 1 days | End: 2022-09-10
Payer: MEDICARE

## 2022-09-10 DIAGNOSIS — Z98.890 OTHER SPECIFIED POSTPROCEDURAL STATES: Chronic | ICD-10-CM

## 2022-09-10 DIAGNOSIS — K40.90 UNILATERAL INGUINAL HERNIA, WITHOUT OBSTRUCTION OR GANGRENE, NOT SPECIFIED AS RECURRENT: ICD-10-CM

## 2022-09-10 DIAGNOSIS — Z00.8 ENCOUNTER FOR OTHER GENERAL EXAMINATION: ICD-10-CM

## 2022-09-10 PROCEDURE — 74176 CT ABD & PELVIS W/O CONTRAST: CPT | Mod: 26

## 2022-09-10 PROCEDURE — 74176 CT ABD & PELVIS W/O CONTRAST: CPT

## 2022-09-25 ENCOUNTER — TRANSCRIPTION ENCOUNTER (OUTPATIENT)
Age: 57
End: 2022-09-25

## 2022-09-29 ENCOUNTER — APPOINTMENT (OUTPATIENT)
Dept: PAIN MANAGEMENT | Facility: CLINIC | Age: 57
End: 2022-09-29

## 2022-09-29 DIAGNOSIS — K40.90 UNILATERAL INGUINAL HERNIA, W/OUT OBSTRUCTION OR GANGRENE, NOT SPECIFIED AS RECURRENT: ICD-10-CM

## 2022-10-25 ENCOUNTER — APPOINTMENT (OUTPATIENT)
Dept: GASTROENTEROLOGY | Facility: CLINIC | Age: 57
End: 2022-10-25

## 2022-10-25 VITALS
HEART RATE: 108 BPM | WEIGHT: 151 LBS | SYSTOLIC BLOOD PRESSURE: 115 MMHG | TEMPERATURE: 97.8 F | BODY MASS INDEX: 21.14 KG/M2 | DIASTOLIC BLOOD PRESSURE: 70 MMHG | OXYGEN SATURATION: 98 % | HEIGHT: 71 IN

## 2022-10-25 DIAGNOSIS — Z87.898 PERSONAL HISTORY OF OTHER SPECIFIED CONDITIONS: ICD-10-CM

## 2022-10-25 PROCEDURE — 99214 OFFICE O/P EST MOD 30 MIN: CPT

## 2022-10-30 PROBLEM — Z87.898 STOPPED DRINKING ALCOHOL: Status: ACTIVE | Noted: 2022-10-30

## 2022-10-30 NOTE — CONSULT LETTER
[DrJonathan  ___] : Dr. FANG [Dear  ___] : Dear  [unfilled], [Consult Letter:] : I had the pleasure of evaluating your patient, [unfilled]. [Please see my note below.] : Please see my note below. [Consult Closing:] : Thank you very much for allowing me to participate in the care of this patient.  If you have any questions, please do not hesitate to contact me. [Sincerely,] : Sincerely, [FreeTextEntry3] : Rodriguez Rocha MD, MPH, AXEL, LEEANNA\par Chief of Clinical Quality in Gastroenterology, [facility]\par Associate Chief of Gastroenterology, [facility]\par Interim Director of Endoscopy, [facility]\par Director of Endoscopic Ultrasound, [facility]\par [address], [address]\par [facility], [address]\par 24 hours [phone]\par \par

## 2022-10-30 NOTE — PHYSICAL EXAM
[Alert] : alert [Sclera] : the sclera and conjunctiva were normal [Bowel Sounds] : normal bowel sounds [No Masses] : no abdominal mass palpated [Abdomen Soft] : soft [Epigastric] : in the epigastric area [Abnormal Walk] : normal gait [Normal Affect] : the affect was normal [de-identified] : Facemask in place [de-identified] : No jaundice [de-identified] : Moderate epigastric tenderness [de-identified] : No confusion

## 2022-10-30 NOTE — HISTORY OF PRESENT ILLNESS
[FreeTextEntry1] : Patient follows up for chronic pancreatitis, alcohol induced, complicated by pancreatic pseudocyst, treated with endoscopic ultrasound-guided drainage at outside hospital in May 2022, followed by worsening abdominal pain prompting hospitalization at [facility].\par \par Still has abd pain, no pain meds, pain management appointments keep on getting rescheduled\par Better than before (left sided resolved, but has epigastric pain and tenderness).\par Still losing weight but weight of loss is slowing down\par Takes Ensure but maybe intolerant, switching brands.\par No f/c/s/n/v.\par \par Physical exam notable for moderate epigastric tenderness.\par \par August 2022 CT abd pancreas protocol\par FINDINGS:\par LOWER CHEST: Within normal limits.\par \par LIVER: Within normal limits.\par BILE DUCTS: Normal caliber.\par GALLBLADDER: Within normal limits.\par SPLEEN: Within normal limits.\par PANCREAS: Normal pancreatic enhancement. Decreased peripancreatic \par stranding and fluid. A collection adjacent to the pancreatic tail \par measures 2.6 x 4.2 cm (2-31), previously 3.1 x 4.1 cm. Additional \par previously referenced collections are as follows:\par *  In the uncinate process, 1.7 x 1.5 cm (2-50), previously 2.2 x 2.4 cm\par *  In the pancreatic head, 3.4 x 3.6 cm (2-47), previously 3.2 x 3.6 cm\par *  Near complete resolution of previously noted collection within the \par pancreatic body (2-40).\par \par ADRENALS: Within normal limits.\par KIDNEYS/URETERS: Within normal limits.\par \par VISUALIZED PORTIONS:\par BOWEL: Wall thickening of the proximal stomach is without significant \par change. 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 \par Atherosclerotic changes. The portal vein and SMV are patent with mild \par mass effect on the SMV from the collection in uncinate process.\par RETROPERITONEUM/LYMPH NODES: Upper abdominal and retroperitoneal \par lymphadenopathy is without significant interval change. A reference \par gastrohepatic lymph node measures 2.2 x 3.0 cm (3-31) and a cluster of \par left retroperitoneal lymph node measures 1.8 x 2.1 cm (2, 47).\par ABDOMINAL WALL: Within normal limits.\par BONES: Within normal limits.\par \par IMPRESSION:\par *  Multiple pancreatic pseudocysts are overall slightly decreased. Small \par intramural gastric collection is minimally increased to not significantly \par changed.\par *  Stable upper abdominal and retroperitoneal lymphadenopathy.\par \par \par \par \par CT abd/pelvis with contrast: 9/10/22\par FINDINGS:\par LOWER CHEST: Within normal limits.\par \par LIVER: Within normal limits.\par BILE DUCTS: Normal caliber.\par GALLBLADDER: Within normal limits.\par SPLEEN: Within normal limits.\par PANCREAS: Cystic lesion in the pancreatic head/neck measuring 3.1 cm \par slightly decreased in size from prior exam. Fullness of the pancreatic \par Sherlyn seen with peripancreatic infiltrative changes extending down \par the left paracolic gutter unchanged.\par ADRENALS: Within normal limits.\par KIDNEYS/URETERS: No hydronephrosis, calculus or mass bilaterally. The \par ureters are unremarkable.\par \par BLADDER: Within normal limits.\par REPRODUCTIVE ORGANS: Prostate within normal limits.\par \par BOWEL: No bowel obstruction. Appendix is normal.\par PERITONEUM: No ascites.\par VESSELS: Atherosclerotic changes. Gastroepiploic varices identified \par suggesting chronic attenuation of the splenic vein.\par RETROPERITONEUM/LYMPH NODES: No lymphadenopathy.\par ABDOMINAL WALL: No significant inguinal hernias as clinically questioned. \par Tiny fat-containing umbilical hernia.\par BONES: Degenerative changes.\par \par IMPRESSION:\par Stable pancreatic head cystic lesion with stable fullness to the \par pancreatic tail and peripancreatic infiltrative changes extending down \par the left paracolic gutter consistent with sequela of known pancreatitis, \par correlate clinically for acute on chronic component.\par \par No definite inguinal hernia as clinically questioned.

## 2022-10-30 NOTE — ASSESSMENT
[FreeTextEntry1] : Impression:\par \par #1.  Chronic pancreatitis / pseudocyst status post endoscopic ultrasound-guided drainage at outside hospital in May 2022 followed by worsened abdominal pain requiring inpatient hospitalization at [facility] later that month.  Still with abdominal pain, CT scan pancreas protocol and CT scan abd/pelvis demonstrate stable 3 cm pseudocyst and unchanged peripancreatic stranding with extension into left pericolic gutter,  has not yet consulted with outpatient pain management.\par #2.  Alcohol induced chronic pancreatitis.\par #3.  Alcohol use disorder, last alcohol intake November 2021\par \par Plan:\par \par #1.  Obtain records from Dr. Anderson Workman ([facility] [place]).  Pt and our office will reach out.\par #2.  Pt will call Stormy NP in 2 weeks for update\par #3.  [facility] Conference for discussion after records obtained.\par #4.  Continue sobriety.

## 2022-10-31 ENCOUNTER — APPOINTMENT (OUTPATIENT)
Dept: SURGERY | Facility: CLINIC | Age: 57
End: 2022-10-31

## 2022-10-31 VITALS
HEART RATE: 78 BPM | BODY MASS INDEX: 21.28 KG/M2 | DIASTOLIC BLOOD PRESSURE: 85 MMHG | HEIGHT: 71 IN | TEMPERATURE: 96.7 F | WEIGHT: 152 LBS | SYSTOLIC BLOOD PRESSURE: 134 MMHG

## 2022-10-31 PROCEDURE — 99203 OFFICE O/P NEW LOW 30 MIN: CPT

## 2022-11-02 ENCOUNTER — OUTPATIENT (OUTPATIENT)
Dept: OUTPATIENT SERVICES | Facility: HOSPITAL | Age: 57
LOS: 1 days | End: 2022-11-02

## 2022-11-02 VITALS
WEIGHT: 154.1 LBS | SYSTOLIC BLOOD PRESSURE: 142 MMHG | HEIGHT: 70 IN | RESPIRATION RATE: 16 BRPM | TEMPERATURE: 97 F | DIASTOLIC BLOOD PRESSURE: 73 MMHG | OXYGEN SATURATION: 98 % | HEART RATE: 83 BPM

## 2022-11-02 DIAGNOSIS — Z92.241 PERSONAL HISTORY OF SYSTEMIC STEROID THERAPY: Chronic | ICD-10-CM

## 2022-11-02 DIAGNOSIS — K40.90 UNILATERAL INGUINAL HERNIA, WITHOUT OBSTRUCTION OR GANGRENE, NOT SPECIFIED AS RECURRENT: ICD-10-CM

## 2022-11-02 DIAGNOSIS — I10 ESSENTIAL (PRIMARY) HYPERTENSION: ICD-10-CM

## 2022-11-02 DIAGNOSIS — Z98.890 OTHER SPECIFIED POSTPROCEDURAL STATES: Chronic | ICD-10-CM

## 2022-11-02 LAB
ALBUMIN SERPL ELPH-MCNC: 4.1 G/DL — SIGNIFICANT CHANGE UP (ref 3.3–5)
ALP SERPL-CCNC: 129 U/L — HIGH (ref 40–120)
ALT FLD-CCNC: 19 U/L — SIGNIFICANT CHANGE UP (ref 4–41)
ANION GAP SERPL CALC-SCNC: 12 MMOL/L — SIGNIFICANT CHANGE UP (ref 7–14)
AST SERPL-CCNC: 28 U/L — SIGNIFICANT CHANGE UP (ref 4–40)
BILIRUB SERPL-MCNC: 0.3 MG/DL — SIGNIFICANT CHANGE UP (ref 0.2–1.2)
BUN SERPL-MCNC: 8 MG/DL — SIGNIFICANT CHANGE UP (ref 7–23)
CALCIUM SERPL-MCNC: 9.1 MG/DL — SIGNIFICANT CHANGE UP (ref 8.4–10.5)
CHLORIDE SERPL-SCNC: 104 MMOL/L — SIGNIFICANT CHANGE UP (ref 98–107)
CO2 SERPL-SCNC: 26 MMOL/L — SIGNIFICANT CHANGE UP (ref 22–31)
CREAT SERPL-MCNC: 0.6 MG/DL — SIGNIFICANT CHANGE UP (ref 0.5–1.3)
EGFR: 113 ML/MIN/1.73M2 — SIGNIFICANT CHANGE UP
GLUCOSE SERPL-MCNC: 82 MG/DL — SIGNIFICANT CHANGE UP (ref 70–99)
HCT VFR BLD CALC: 31 % — LOW (ref 39–50)
HGB BLD-MCNC: 9.8 G/DL — LOW (ref 13–17)
MCHC RBC-ENTMCNC: 29.5 PG — SIGNIFICANT CHANGE UP (ref 27–34)
MCHC RBC-ENTMCNC: 31.6 GM/DL — LOW (ref 32–36)
MCV RBC AUTO: 93.4 FL — SIGNIFICANT CHANGE UP (ref 80–100)
NRBC # BLD: 0 /100 WBCS — SIGNIFICANT CHANGE UP (ref 0–0)
NRBC # FLD: 0 K/UL — SIGNIFICANT CHANGE UP (ref 0–0)
PLATELET # BLD AUTO: 426 K/UL — HIGH (ref 150–400)
POTASSIUM SERPL-MCNC: 3.7 MMOL/L — SIGNIFICANT CHANGE UP (ref 3.5–5.3)
POTASSIUM SERPL-SCNC: 3.7 MMOL/L — SIGNIFICANT CHANGE UP (ref 3.5–5.3)
PROT SERPL-MCNC: 7.6 G/DL — SIGNIFICANT CHANGE UP (ref 6–8.3)
RBC # BLD: 3.32 M/UL — LOW (ref 4.2–5.8)
RBC # FLD: 17.4 % — HIGH (ref 10.3–14.5)
SODIUM SERPL-SCNC: 142 MMOL/L — SIGNIFICANT CHANGE UP (ref 135–145)
WBC # BLD: 6.49 K/UL — SIGNIFICANT CHANGE UP (ref 3.8–10.5)
WBC # FLD AUTO: 6.49 K/UL — SIGNIFICANT CHANGE UP (ref 3.8–10.5)

## 2022-11-02 PROCEDURE — 93010 ELECTROCARDIOGRAM REPORT: CPT

## 2022-11-02 RX ORDER — ACETAMINOPHEN 500 MG
2 TABLET ORAL
Qty: 0 | Refills: 0 | DISCHARGE

## 2022-11-02 RX ORDER — PANTOPRAZOLE SODIUM 20 MG/1
1 TABLET, DELAYED RELEASE ORAL
Qty: 0 | Refills: 0 | DISCHARGE

## 2022-11-02 NOTE — H&P PST ADULT - NSICDXFAMILYHX_GEN_ALL_CORE_FT
FAMILY HISTORY:  Father  Still living? No  Family history of prostate cancer in father, Age at diagnosis: Age Unknown    Mother  Still living? Yes, Estimated age: Age Unknown  FH: multiple myeloma, Age at diagnosis: Age Unknown

## 2022-11-02 NOTE — H&P PST ADULT - NSICDXPASTMEDICALHX_GEN_ALL_CORE_FT
PAST MEDICAL HISTORY:  Cervical herniated disc     Hernia     Hypertension     Hypothyroid     Lumbar herniated disc     Pancreatitis     PTSD (post-traumatic stress disorder) "due to 9/11"     PAST MEDICAL HISTORY:  2019 novel coronavirus disease (COVID-19) 2021    Bilateral inguinal hernia     Cervical herniated disc     Hernia     Hypertension     Hypothyroid     Lumbar herniated disc     Pancreatitis     PTSD (post-traumatic stress disorder) "due to 9/11"

## 2022-11-11 PROBLEM — M50.20 OTHER CERVICAL DISC DISPLACEMENT, UNSPECIFIED CERVICAL REGION: Chronic | Status: ACTIVE | Noted: 2022-11-02

## 2022-11-11 PROBLEM — M51.26 OTHER INTERVERTEBRAL DISC DISPLACEMENT, LUMBAR REGION: Chronic | Status: ACTIVE | Noted: 2022-11-02

## 2022-11-11 PROBLEM — K40.20 BILATERAL INGUINAL HERNIA, WITHOUT OBSTRUCTION OR GANGRENE, NOT SPECIFIED AS RECURRENT: Chronic | Status: ACTIVE | Noted: 2022-11-02

## 2022-11-11 PROBLEM — U07.1 COVID-19: Chronic | Status: ACTIVE | Noted: 2022-11-02

## 2022-11-11 PROBLEM — F43.10 POST-TRAUMATIC STRESS DISORDER, UNSPECIFIED: Chronic | Status: ACTIVE | Noted: 2022-11-02

## 2022-11-13 ENCOUNTER — LABORATORY RESULT (OUTPATIENT)
Age: 57
End: 2022-11-13

## 2022-11-15 ENCOUNTER — TRANSCRIPTION ENCOUNTER (OUTPATIENT)
Age: 57
End: 2022-11-15

## 2022-11-15 VITALS
HEIGHT: 70 IN | OXYGEN SATURATION: 98 % | WEIGHT: 154.1 LBS | SYSTOLIC BLOOD PRESSURE: 122 MMHG | DIASTOLIC BLOOD PRESSURE: 71 MMHG | HEART RATE: 90 BPM | RESPIRATION RATE: 16 BRPM | TEMPERATURE: 98 F

## 2022-11-16 ENCOUNTER — APPOINTMENT (OUTPATIENT)
Dept: SURGERY | Facility: AMBULATORY SURGERY CENTER | Age: 57
End: 2022-11-16

## 2022-11-16 ENCOUNTER — TRANSCRIPTION ENCOUNTER (OUTPATIENT)
Age: 57
End: 2022-11-16

## 2022-11-16 ENCOUNTER — OUTPATIENT (OUTPATIENT)
Dept: OUTPATIENT SERVICES | Facility: HOSPITAL | Age: 57
LOS: 1 days | Discharge: HOME | End: 2022-11-16

## 2022-11-16 VITALS
OXYGEN SATURATION: 98 % | SYSTOLIC BLOOD PRESSURE: 144 MMHG | RESPIRATION RATE: 17 BRPM | DIASTOLIC BLOOD PRESSURE: 95 MMHG | HEART RATE: 96 BPM

## 2022-11-16 DIAGNOSIS — K40.90 UNILATERAL INGUINAL HERNIA, WITHOUT OBSTRUCTION OR GANGRENE, NOT SPECIFIED AS RECURRENT: ICD-10-CM

## 2022-11-16 DIAGNOSIS — Z92.241 PERSONAL HISTORY OF SYSTEMIC STEROID THERAPY: Chronic | ICD-10-CM

## 2022-11-16 PROCEDURE — 49650 LAP ING HERNIA REPAIR INIT: CPT | Mod: 50,GC

## 2022-11-16 DEVICE — CLIP APPLIER COVIDIEN ENDOCLIP III 5MM: Type: IMPLANTABLE DEVICE | Status: FUNCTIONAL

## 2022-11-16 DEVICE — TROCAR COVIDIEN SPACEMAKER PRO BLUNT TIP 10MM-12MM WITH DISSECTION BALLOON OVAL: Type: IMPLANTABLE DEVICE | Status: FUNCTIONAL

## 2022-11-16 DEVICE — MESH HERNIA INGUINAL PROGRIP LAPAROSCOPIC 15 X 10CM LEFT: Type: IMPLANTABLE DEVICE | Status: FUNCTIONAL

## 2022-11-16 DEVICE — MESH HERNIA INGUINAL PROGRIP LAPAROSCOPIC 15 X 10CM RIGHT: Type: IMPLANTABLE DEVICE | Status: FUNCTIONAL

## 2022-11-16 RX ORDER — AMLODIPINE BESYLATE 2.5 MG/1
1 TABLET ORAL
Qty: 0 | Refills: 0 | DISCHARGE

## 2022-11-16 RX ORDER — LEVOTHYROXINE SODIUM 125 MCG
1 TABLET ORAL
Qty: 0 | Refills: 0 | DISCHARGE

## 2022-11-16 NOTE — ASU DISCHARGE PLAN (ADULT/PEDIATRIC) - MEDICATION INSTRUCTIONS
1000mg tylenol + 400mg motrin or advil every 6hrs as needed for pain 1000mg tylenol + 400mg motrin or advil every 6hrs as needed for pain. Next dose of Tylenol can be taken at 745pm

## 2022-11-16 NOTE — ASU DISCHARGE PLAN (ADULT/PEDIATRIC) - CALL YOUR DOCTOR IF YOU HAVE ANY OF THE FOLLOWING:
Bleeding that does not stop/Swelling that gets worse/Fever greater than (need to indicate Fahrenheit or Celsius)/Wound/Surgical Site with redness, or foul smelling discharge or pus/Nausea and vomiting that does not stop/Unable to urinate Bleeding that does not stop/Swelling that gets worse/Fever greater than (need to indicate Fahrenheit or Celsius)/Wound/Surgical Site with redness, or foul smelling discharge or pus/Nausea and vomiting that does not stop/Unable to urinate/Inability to tolerate liquids or foods

## 2022-11-16 NOTE — ASU PREOP CHECKLIST - HEART RATE (BEATS/MIN)
Problem: Communication  Goal: The ability to communicate needs accurately and effectively will improve  Outcome: MET     Problem: Safety  Goal: Will remain free from injury  Outcome: MET     Problem: Medication  Goal: Compliance with prescribed medication will improve  Outcome: MET     
  Problem: Pain Management  Goal: Pain level will decrease to patient's comfort goal  7/4/2020 2257 by Kandis Morgan R.N.  Outcome: PROGRESSING AS EXPECTED         Problem: Infection  Goal: Will remain free from infection  Outcome: PROGRESSING AS EXPECTED     
90

## 2022-11-16 NOTE — ASU DISCHARGE PLAN (ADULT/PEDIATRIC) - NS MD DC FALL RISK RISK
For information on Fall & Injury Prevention, visit: [email or web address] OR  [email or web address] OR  https://www.cdc.gov/steadi/patient.html

## 2022-11-28 ENCOUNTER — APPOINTMENT (OUTPATIENT)
Dept: SURGERY | Facility: CLINIC | Age: 57
End: 2022-11-28

## 2022-11-28 VITALS
BODY MASS INDEX: 21.56 KG/M2 | DIASTOLIC BLOOD PRESSURE: 89 MMHG | TEMPERATURE: 99 F | HEIGHT: 71 IN | HEART RATE: 80 BPM | WEIGHT: 154 LBS | SYSTOLIC BLOOD PRESSURE: 148 MMHG

## 2022-11-28 PROCEDURE — 99024 POSTOP FOLLOW-UP VISIT: CPT

## 2022-11-28 NOTE — ASSESSMENT
[FreeTextEntry1] : patient is well, has some periumbilical discomfort and right testicular sensitivity.\par \par \par \par incisions are c/d/i and healing well. no hematomas or seromas \par \par \par f/u 2 weeks

## 2022-12-05 NOTE — PATIENT PROFILE ADULT - FUNCTIONAL ASSESSMENT - BASIC MOBILITY 6.
Impression: Presence of intraocular lens: Z96.1.  Plan: Continue to observe 4 = No assist / stand by assistance

## 2023-01-05 ENCOUNTER — APPOINTMENT (OUTPATIENT)
Dept: GASTROENTEROLOGY | Facility: CLINIC | Age: 58
End: 2023-01-05

## 2023-01-05 VITALS
BODY MASS INDEX: 21.14 KG/M2 | SYSTOLIC BLOOD PRESSURE: 139 MMHG | WEIGHT: 151 LBS | TEMPERATURE: 97.2 F | HEIGHT: 71 IN | DIASTOLIC BLOOD PRESSURE: 87 MMHG | HEART RATE: 84 BPM | OXYGEN SATURATION: 97 %

## 2023-01-26 ENCOUNTER — NON-APPOINTMENT (OUTPATIENT)
Age: 58
End: 2023-01-26

## 2023-01-26 ENCOUNTER — APPOINTMENT (OUTPATIENT)
Dept: GASTROENTEROLOGY | Facility: CLINIC | Age: 58
End: 2023-01-26

## 2023-02-09 ENCOUNTER — LABORATORY RESULT (OUTPATIENT)
Age: 58
End: 2023-02-09

## 2023-02-09 ENCOUNTER — APPOINTMENT (OUTPATIENT)
Dept: FAMILY MEDICINE | Facility: CLINIC | Age: 58
End: 2023-02-09
Payer: MEDICARE

## 2023-02-09 VITALS
HEIGHT: 71 IN | DIASTOLIC BLOOD PRESSURE: 80 MMHG | BODY MASS INDEX: 21.42 KG/M2 | TEMPERATURE: 98.1 F | OXYGEN SATURATION: 100 % | HEART RATE: 97 BPM | SYSTOLIC BLOOD PRESSURE: 131 MMHG | WEIGHT: 153 LBS

## 2023-02-09 DIAGNOSIS — M54.50 LOW BACK PAIN, UNSPECIFIED: ICD-10-CM

## 2023-02-09 DIAGNOSIS — Z12.11 ENCOUNTER FOR SCREENING FOR MALIGNANT NEOPLASM OF COLON: ICD-10-CM

## 2023-02-09 PROCEDURE — 36415 COLL VENOUS BLD VENIPUNCTURE: CPT

## 2023-02-09 PROCEDURE — 99214 OFFICE O/P EST MOD 30 MIN: CPT | Mod: 25

## 2023-02-09 NOTE — HISTORY OF PRESENT ILLNESS
[de-identified] : Pt came back for q 3 months follow up. Pt has been taking all her medications, needs Rxs for all medications and blood tests today. Pt had no major complaints\par Pt had steroid injection at his lumbar spine yesterday, bilateral inguinal hernia repair in 2022 and endoscopic ultrasound and fine-needle aspiration of the pancreatic head cyst in 2022. Pt has been stable since then. Pt denied abdominal pain. Low back pain has improved after spine injection.

## 2023-02-10 LAB
25(OH)D3 SERPL-MCNC: 39.4 NG/ML
ALBUMIN SERPL ELPH-MCNC: 4.4 G/DL
ALP BLD-CCNC: 91 U/L
ALT SERPL-CCNC: 12 U/L
ANION GAP SERPL CALC-SCNC: 13 MMOL/L
APPEARANCE: CLEAR
AST SERPL-CCNC: 19 U/L
BASOPHILS # BLD AUTO: 0.01 K/UL
BASOPHILS NFR BLD AUTO: 0.2 %
BILIRUB SERPL-MCNC: 0.2 MG/DL
BILIRUBIN URINE: NEGATIVE
BLOOD URINE: NEGATIVE
BUN SERPL-MCNC: 10 MG/DL
CALCIUM SERPL-MCNC: 10.1 MG/DL
CHLORIDE SERPL-SCNC: 106 MMOL/L
CHOLEST SERPL-MCNC: 144 MG/DL
CO2 SERPL-SCNC: 25 MMOL/L
COLOR: YELLOW
CREAT SERPL-MCNC: 0.66 MG/DL
EGFR: 109 ML/MIN/1.73M2
EOSINOPHIL # BLD AUTO: 0 K/UL
EOSINOPHIL NFR BLD AUTO: 0 %
ESTIMATED AVERAGE GLUCOSE: 103 MG/DL
GLUCOSE QUALITATIVE U: NORMAL
GLUCOSE SERPL-MCNC: 120 MG/DL
HBA1C MFR BLD HPLC: 5.2 %
HCT VFR BLD CALC: 34 %
HDLC SERPL-MCNC: 47 MG/DL
HGB BLD-MCNC: 10.5 G/DL
IMM GRANULOCYTES NFR BLD AUTO: 0.3 %
KETONES URINE: NORMAL
LDLC SERPL CALC-MCNC: 87 MG/DL
LEUKOCYTE ESTERASE URINE: NEGATIVE
LYMPHOCYTES # BLD AUTO: 1.6 K/UL
LYMPHOCYTES NFR BLD AUTO: 26.2 %
MAN DIFF?: NORMAL
MCHC RBC-ENTMCNC: 30.9 GM/DL
MCHC RBC-ENTMCNC: 31.3 PG
MCV RBC AUTO: 101.5 FL
MONOCYTES # BLD AUTO: 0.54 K/UL
MONOCYTES NFR BLD AUTO: 8.8 %
NEUTROPHILS # BLD AUTO: 3.94 K/UL
NEUTROPHILS NFR BLD AUTO: 64.5 %
NITRITE URINE: NEGATIVE
NONHDLC SERPL-MCNC: 97 MG/DL
PH URINE: 6
PLATELET # BLD AUTO: 422 K/UL
POTASSIUM SERPL-SCNC: 4.7 MMOL/L
PROT SERPL-MCNC: 7.4 G/DL
PROTEIN URINE: ABNORMAL
RBC # BLD: 3.35 M/UL
RBC # FLD: 16.6 %
SODIUM SERPL-SCNC: 144 MMOL/L
SPECIFIC GRAVITY URINE: 1.05
T3 SERPL-MCNC: 62 NG/DL
T3FREE SERPL-MCNC: 1.87 PG/ML
T4 FREE SERPL-MCNC: 1 NG/DL
T4 SERPL-MCNC: 6.3 UG/DL
TRIGL SERPL-MCNC: 50 MG/DL
TSH SERPL-ACNC: 7.19 UIU/ML
URATE SERPL-MCNC: 4.4 MG/DL
UROBILINOGEN URINE: NORMAL
WBC # FLD AUTO: 6.11 K/UL

## 2023-02-11 LAB
FERRITIN SERPL-MCNC: 31 NG/ML
IRON SATN MFR SERPL: 11 %
IRON SERPL-MCNC: 43 UG/DL
TIBC SERPL-MCNC: 398 UG/DL
UIBC SERPL-MCNC: 354 UG/DL

## 2023-02-15 ENCOUNTER — APPOINTMENT (OUTPATIENT)
Dept: FAMILY MEDICINE | Facility: CLINIC | Age: 58
End: 2023-02-15
Payer: MEDICARE

## 2023-02-15 PROCEDURE — 99442: CPT | Mod: 95

## 2023-02-15 NOTE — HISTORY OF PRESENT ILLNESS
[Home] : at home, [unfilled] , at the time of the visit. [Medical Office: ([facility])___] : at the medical office located in  [Verbal consent obtained from patient] : the patient, [unfilled] [de-identified] : RBC was 3.35, hemoglobin 10.5, HCT 34, .5, RDW 16.6%, platelets 422, ferritin 31, iron 43, iron sat 11%, fasting glucose 120, HBA1c 5.2, TSH 7.19, free T3 1.87, total T3 62, UA positive for protein 100 mg/dl, and epithelial cells 6/HPF. Reports were reviewed with pt in details. Other blood tests came back wnl. Pt has been taking all his medications.\par \par \par

## 2023-02-21 LAB
CREAT 24H UR-MCNC: 1.4 G/24 H
CREAT ?TM UR-MCNC: 139 MG/DL
PROT 24H UR-MRATE: 20 MG/DL
PROT ?TM UR-MCNC: 24 HR
PROT UR-MCNC: 200 MG/24 H
SPECIMEN VOL 24H UR: 1000 ML

## 2023-02-24 NOTE — ED ADULT TRIAGE NOTE - HEIGHT IN FEET
5 Detail Level: Simple Additional Notes: Patient consent was obtained to proceed with the visit and recommended plan of care after discussion of all risks and benefits, including the risks of COVID-19 exposure.

## 2023-02-28 ENCOUNTER — APPOINTMENT (OUTPATIENT)
Dept: FAMILY MEDICINE | Facility: CLINIC | Age: 58
End: 2023-02-28
Payer: MEDICARE

## 2023-02-28 PROCEDURE — 99441: CPT | Mod: 95

## 2023-02-28 NOTE — HISTORY OF PRESENT ILLNESS
[Home] : at home, [unfilled] , at the time of the visit. [Medical Office: ([facility])___] : at the medical office located in  [Verbal consent obtained from patient] : the patient, [unfilled] [de-identified] : 24 hour urine for protein was 200. Reports were reviewed with pt in details today.\par

## 2023-03-09 ENCOUNTER — APPOINTMENT (OUTPATIENT)
Dept: GASTROENTEROLOGY | Facility: CLINIC | Age: 58
End: 2023-03-09
Payer: MEDICARE

## 2023-03-09 VITALS
WEIGHT: 158 LBS | HEART RATE: 97 BPM | HEIGHT: 71 IN | DIASTOLIC BLOOD PRESSURE: 80 MMHG | TEMPERATURE: 98 F | BODY MASS INDEX: 22.12 KG/M2 | SYSTOLIC BLOOD PRESSURE: 125 MMHG | OXYGEN SATURATION: 97 %

## 2023-03-09 DIAGNOSIS — R10.10 UPPER ABDOMINAL PAIN, UNSPECIFIED: ICD-10-CM

## 2023-03-09 PROCEDURE — 99214 OFFICE O/P EST MOD 30 MIN: CPT

## 2023-03-09 RX ORDER — PANTOPRAZOLE 40 MG/1
40 TABLET, DELAYED RELEASE ORAL DAILY
Qty: 1 | Refills: 1 | Status: DISCONTINUED | COMMUNITY
Start: 2023-03-09 | End: 2023-03-09

## 2023-03-09 RX ORDER — CHLORZOXAZONE 500 MG/1
500 TABLET ORAL
Qty: 75 | Refills: 0 | Status: DISCONTINUED | COMMUNITY
Start: 2018-09-14 | End: 2023-03-09

## 2023-03-20 ENCOUNTER — APPOINTMENT (OUTPATIENT)
Dept: MRI IMAGING | Facility: HOSPITAL | Age: 58
End: 2023-03-20
Payer: MEDICARE

## 2023-03-20 ENCOUNTER — OUTPATIENT (OUTPATIENT)
Dept: OUTPATIENT SERVICES | Facility: HOSPITAL | Age: 58
LOS: 1 days | End: 2023-03-20
Payer: MEDICARE

## 2023-03-20 DIAGNOSIS — K86.3 PSEUDOCYST OF PANCREAS: ICD-10-CM

## 2023-03-20 DIAGNOSIS — K86.0 ALCOHOL-INDUCED CHRONIC PANCREATITIS: ICD-10-CM

## 2023-03-20 DIAGNOSIS — Z92.241 PERSONAL HISTORY OF SYSTEMIC STEROID THERAPY: Chronic | ICD-10-CM

## 2023-03-20 PROCEDURE — A9579: CPT

## 2023-03-20 PROCEDURE — 74183 MRI ABD W/O CNTR FLWD CNTR: CPT

## 2023-03-20 PROCEDURE — 74183 MRI ABD W/O CNTR FLWD CNTR: CPT | Mod: 26,MH

## 2023-03-26 PROBLEM — R10.10 PAIN LOCALIZED TO UPPER ABDOMEN: Status: ACTIVE | Noted: 2023-03-26

## 2023-03-26 NOTE — PHYSICAL EXAM
[Alert] : alert [Sclera] : the sclera and conjunctiva were normal [Bowel Sounds] : normal bowel sounds [No Masses] : no abdominal mass palpated [Abdomen Soft] : soft [Epigastric] : in the epigastric area [Abnormal Walk] : normal gait [Normal Affect] : the affect was normal [de-identified] : Facemask in place [de-identified] : Moderate epigastric tenderness [de-identified] : No jaundice [de-identified] : No confusion

## 2023-03-26 NOTE — ASSESSMENT
[FreeTextEntry1] : Impression:\par \par #1. Chronic pancreatitis / pseudocyst status post endoscopic ultrasound-guided drainage at outside hospital in May 2022 followed by worsened abdominal pain requiring inpatient hospitalization at [facility] later that month. Still with abdominal pain, CT scan pancreas protocol and CT scan abd/pelvis demonstrate stable 3 cm pseudocyst and unchanged peripancreatic stranding with extension into left pericolic gutter, has not yet consulted with outpatient pain management.\par #2. Alcohol induced chronic pancreatitis.\par #3. Alcohol use disorder, last alcohol intake November 2021\par \par Plan:\par \par #1.  MRI/MRCP with and without contrast of the abdomen\par #2. [facility] Conference for discussion \par #3. Continue sobriety. \par #4.  Omeprazole 40 mg daily for empiric treatment of upper abdominal pain\par #5.  Office follow-up in approximately 1 month\par #6.  Pain management appointment.

## 2023-03-26 NOTE — CONSULT LETTER
[Dear  ___] : Dear  [unfilled], [Consult Letter:] : I had the pleasure of evaluating your patient, [unfilled]. [Please see my note below.] : Please see my note below. [Consult Closing:] : Thank you very much for allowing me to participate in the care of this patient.  If you have any questions, please do not hesitate to contact me. [Sincerely,] : Sincerely, [DrJonathan  ___] : Dr. FANG [FreeTextEntry3] : Rodriguez Rocha MD, MPH, AXEL, LEEANNA\par Chief of Clinical Quality in Gastroenterology, [facility]\par Associate Chief of Gastroenterology, [facility]\par Interim Director of Endoscopy, [facility]\par Director of Endoscopic Ultrasound, [facility]\par [address], [address]\par [place], [address]\par 24 hours [phone]\par \par

## 2023-03-26 NOTE — HISTORY OF PRESENT ILLNESS
[FreeTextEntry1] : Patient follows up for chronic pancreatitis, alcohol induced, complicated by pancreatic pseudocyst, treated with endoscopic ultrasound-guided drainage at outside hospital in May 2022, followed by worsening abdominal pain prompting hospitalization at [facility].\par \par Still has upper abd pain, no pain meds, pain management appointments keep on getting rescheduled\par Gaining weight, up 5 lbs since early Feb visit.\par No f/c/s/n/v.\par ________________________\par \par Office records of Dr. Cai, hepatobiliary surgeon, reviewed from 2022.\par Patient underwent endoscopic ultrasound and fine-needle aspiration of the pancreatic head cyst by interventional gastroenterologist in [place] in May 2022. High amylase levels, CEA level greater than 300, molecular analysis suggest low risk cyst per his office notes.\par Patient was documented to have abdominal pain of mild severity after endoscopic ultrasound.\par His plan was serial MRI imaging.\par ________________________\par \par August 2022 CT abd pancreas protocol\par FINDINGS:\par LOWER CHEST: Within normal limits.\par \par LIVER: Within normal limits.\par BILE DUCTS: Normal caliber.\par GALLBLADDER: Within normal limits.\par SPLEEN: Within normal limits.\par PANCREAS: Normal pancreatic enhancement. Decreased peripancreatic \par stranding and fluid. A collection adjacent to the pancreatic tail \par measures 2.6 x 4.2 cm (2-31), previously 3.1 x 4.1 cm. Additional \par previously referenced collections are as follows:\par *  In the uncinate process, 1.7 x 1.5 cm (2-50), previously 2.2 x 2.4 cm\par *  In the pancreatic head, 3.4 x 3.6 cm (2-47), previously 3.2 x 3.6 cm\par *  Near complete resolution of previously noted collection within the \par pancreatic body (2-40).\par \par ADRENALS: Within normal limits.\par KIDNEYS/URETERS: Within normal limits.\par \par VISUALIZED PORTIONS:\par BOWEL: Wall thickening of the proximal stomach is without significant \par change. 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 \par Atherosclerotic changes. The portal vein and SMV are patent with mild \par mass effect on the SMV from the collection in uncinate process.\par RETROPERITONEUM/LYMPH NODES: Upper abdominal and retroperitoneal \par lymphadenopathy is without significant interval change. A reference \par gastrohepatic lymph node measures 2.2 x 3.0 cm (3-31) and a cluster of \par left retroperitoneal lymph node measures 1.8 x 2.1 cm (2, 47).\par ABDOMINAL WALL: Within normal limits.\par BONES: Within normal limits.\par \par IMPRESSION:\par *  Multiple pancreatic pseudocysts are overall slightly decreased. Small \par intramural gastric collection is minimally increased to not significantly \par changed.\par *  Stable upper abdominal and retroperitoneal lymphadenopathy.\par \par \par \par \par CT abd/pelvis with contrast: 9/10/22\par FINDINGS:\par LOWER CHEST: Within normal limits.\par \par LIVER: Within normal limits.\par BILE DUCTS: Normal caliber.\par GALLBLADDER: Within normal limits.\par SPLEEN: Within normal limits.\par PANCREAS: Cystic lesion in the pancreatic head/neck measuring 3.1 cm \par slightly decreased in size from prior exam. Fullness of the pancreatic \par [place] seen with peripancreatic infiltrative changes extending down \par the left paracolic gutter unchanged.\par ADRENALS: Within normal limits.\par KIDNEYS/URETERS: No hydronephrosis, calculus or mass bilaterally. The \par ureters are unremarkable.\par \par BLADDER: Within normal limits.\par REPRODUCTIVE ORGANS: Prostate within normal limits.\par \par BOWEL: No bowel obstruction. Appendix is normal.\par PERITONEUM: No ascites.\par VESSELS: Atherosclerotic changes. Gastroepiploic varices identified \par suggesting chronic attenuation of the splenic vein.\par RETROPERITONEUM/LYMPH NODES: No lymphadenopathy.\par ABDOMINAL WALL: No significant inguinal hernias as clinically questioned. \par Tiny fat-containing umbilical hernia.\par BONES: Degenerative changes.\par \par IMPRESSION:\par Stable pancreatic head cystic lesion with stable fullness to the \par pancreatic tail and peripancreatic infiltrative changes extending down \par the left paracolic gutter consistent with sequela of known pancreatitis, \par correlate clinically for acute on chronic component.\par \par No definite inguinal hernia as clinically questioned.

## 2023-04-20 ENCOUNTER — APPOINTMENT (OUTPATIENT)
Dept: GASTROENTEROLOGY | Facility: CLINIC | Age: 58
End: 2023-04-20
Payer: MEDICARE

## 2023-04-20 VITALS
HEART RATE: 89 BPM | WEIGHT: 151 LBS | TEMPERATURE: 97 F | HEIGHT: 71 IN | SYSTOLIC BLOOD PRESSURE: 108 MMHG | OXYGEN SATURATION: 98 % | BODY MASS INDEX: 21.14 KG/M2 | DIASTOLIC BLOOD PRESSURE: 66 MMHG

## 2023-04-20 DIAGNOSIS — R18.8 OTHER ASCITES: ICD-10-CM

## 2023-04-20 DIAGNOSIS — R10.9 UNSPECIFIED ABDOMINAL PAIN: ICD-10-CM

## 2023-04-20 PROCEDURE — 99214 OFFICE O/P EST MOD 30 MIN: CPT

## 2023-04-23 PROBLEM — R18.8 INTRA-ABDOMINAL COLLECTION: Status: ACTIVE | Noted: 2023-03-26

## 2023-04-23 PROBLEM — R10.9 ABDOMINAL PAIN: Status: ACTIVE | Noted: 2023-04-23

## 2023-04-23 NOTE — HISTORY OF PRESENT ILLNESS
[FreeTextEntry1] : Patient follows up for chronic pancreatitis, alcohol induced, complicated by pancreatic pseudocyst, treated with endoscopic ultrasound-guided drainage at outside hospital in May 2022, followed by worsening abdominal pain prompting hospitalization at [facility].\par \par Still has upper abd pain, no pain meds, having difficulty finding pain management specialist.\par Main complaint is decreased or altered taste sensation.\par Weight down 7 lb since March 9th visit.\par No f/c/s/n/v.\par \par Patient had interval MRI/MRCP with and without contrast dated 3/20/2023.\par \par \par  MR MRCP w/wo IV Cont             Final\par \par   ACC: [id number]     EXAM:  MR MRCP WAW IC   ORDERED BY: DEBRA NEWTON\par \par PROCEDURE DATE:  03/20/2023\par \par INTERPRETATION:  CLINICAL INFORMATION: Abdominal pain. Acute on chronic pancreatitis.\par \par COMPARISON: 10/30/2020.\par \par CONTRAST/COMPLICATIONS:\par IV Contrast: Gadavist  7 cc administered   3 cc discarded\par Oral Contrast: NONE\par Complications: None reported at time of study completion\par \par PROCEDURE:\par MRI of the abdomen was performed.\par MRCP was performed.\par \par FINDINGS:\par LOWER CHEST: Within normal limits.\par \par LIVER: Within normal limits.\par BILE DUCTS: Normal caliber.\par GALLBLADDER: Within normal limits.\par SPLEEN: Within normal limits.\par PANCREAS: Evidence of a 2.7 cm sized complex collection involving the head of the pancreas compatible with walled off necrosis. An adjacent 1 cm sized cystic focus and a 1.1 cm sized complex cystic focus in the body of the pancreas may be related to pseudocysts or cystic neoplasms. There is a 2 cm sized collection with dependent debris in the region of lesser sac. Trace peripancreatic fluid is identified at the tail of the pancreas. No dilatation of the pancreatic duct.\par ADRENALS: Within normal limits.\par KIDNEYS/URETERS: Tiny cyst upper pole of the left kidney.\par \par VISUALIZED PORTIONS:\par BOWEL: Interval improvement of intramural collection involving the stomach.\par PERITONEUM: No ascites.\par VESSELS: Redemonstration of occluded splenic vein with evidence of collaterals. Portal vein is patent.\par RETROPERITONEUM/LYMPH NODES: No lymphadenopathy.\par ABDOMINAL WALL: Within normal limits.\par BONES: Within normal limits.\par \par IMPRESSION:\par 3 cystic pancreatic foci measuring up to 2.7 cm compatible with walled off necrosis. 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 Cai, hepatobiliary surgeon, reviewed from 2022.\par Patient underwent endoscopic ultrasound and fine-needle aspiration of the pancreatic head cyst by interventional gastroenterologist in [place] in May 2022. High amylase levels, CEA level greater than 300, molecular analysis suggest low risk cyst per his office notes.\par Patient was documented to have abdominal pain of mild severity after endoscopic ultrasound.\par His plan was serial MRI imaging.\par ________________________\par \par August 2022 CT abd pancreas protocol\par FINDINGS:\par LOWER CHEST: Within normal limits.\par \par LIVER: Within normal limits.\par BILE DUCTS: Normal caliber.\par GALLBLADDER: Within normal limits.\par SPLEEN: Within normal limits.\par PANCREAS: Normal pancreatic enhancement. Decreased peripancreatic \par stranding and fluid. A collection adjacent to the pancreatic tail \par measures 2.6 x 4.2 cm (2-31), previously 3.1 x 4.1 cm. Additional \par previously referenced collections are as follows:\par *  In the uncinate process, 1.7 x 1.5 cm (2-50), previously 2.2 x 2.4 cm\par *  In the pancreatic head, 3.4 x 3.6 cm (2-47), previously 3.2 x 3.6 cm\par *  Near complete resolution of previously noted collection within the \par pancreatic body (2-40).\par \par ADRENALS: Within normal limits.\par KIDNEYS/URETERS: Within normal limits.\par \par VISUALIZED PORTIONS:\par BOWEL: Wall thickening of the proximal stomach is without significant \par change. 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 \par Atherosclerotic changes. The portal vein and SMV are patent with mild \par mass effect on the SMV from the collection in uncinate process.\par RETROPERITONEUM/LYMPH NODES: Upper abdominal and retroperitoneal \par lymphadenopathy is without significant interval change. A reference \par gastrohepatic lymph node measures 2.2 x 3.0 cm (3-31) and a cluster of \par left retroperitoneal lymph node measures 1.8 x 2.1 cm (2, 47).\par ABDOMINAL WALL: Within normal limits.\par BONES: Within normal limits.\par \par IMPRESSION:\par *  Multiple pancreatic pseudocysts are overall slightly decreased. Small \par intramural gastric collection is minimally increased to not significantly \par changed.\par *  Stable upper abdominal and retroperitoneal lymphadenopathy.\par \par \par \par \par CT abd/pelvis with contrast: 9/10/22\par FINDINGS:\par LOWER CHEST: Within normal limits.\par \par LIVER: Within normal limits.\par BILE DUCTS: Normal caliber.\par GALLBLADDER: Within normal limits.\par SPLEEN: Within normal limits.\par PANCREAS: Cystic lesion in the pancreatic head/neck measuring 3.1 cm \par slightly decreased in size from prior exam. Fullness of the pancreatic \par [place] seen with peripancreatic infiltrative changes extending down \par the left paracolic gutter unchanged.\par ADRENALS: Within normal limits.\par KIDNEYS/URETERS: No hydronephrosis, calculus or mass bilaterally. The \par ureters are unremarkable.\par \par BLADDER: Within normal limits.\par REPRODUCTIVE ORGANS: Prostate within normal limits.\par \par BOWEL: No bowel obstruction. Appendix is normal.\par PERITONEUM: No ascites.\par VESSELS: Atherosclerotic changes. Gastroepiploic varices identified \par suggesting chronic attenuation of the splenic vein.\par RETROPERITONEUM/LYMPH NODES: No lymphadenopathy.\par ABDOMINAL WALL: No significant inguinal hernias as clinically questioned. \par Tiny fat-containing umbilical hernia.\par BONES: Degenerative changes.\par \par IMPRESSION:\par Stable pancreatic head cystic lesion with stable fullness to the \par pancreatic tail and peripancreatic infiltrative changes extending down \par the left paracolic gutter consistent with sequela of known pancreatitis, \par correlate clinically for acute on chronic component.\par \par No definite inguinal hernia as clinically questioned.

## 2023-04-23 NOTE — CONSULT LETTER
[Dear  ___] : Dear  [unfilled], [Consult Letter:] : I had the pleasure of evaluating your patient, [unfilled]. [Please see my note below.] : Please see my note below. [Consult Closing:] : Thank you very much for allowing me to participate in the care of this patient.  If you have any questions, please do not hesitate to contact me. [Sincerely,] : Sincerely, [FreeTextEntry3] : Rodriguez Rocha MD, MPH, AXEL, LEEANNA\par Chief of Clinical Quality in Gastroenterology, [facility]\par Associate Chief of Gastroenterology, [facility]\par Interim Director of Endoscopy, [facility]\par Director of Endoscopic Ultrasound, [facility]\par [address], [address]\par [place], [address]\par 24 hours [phone]\par \par

## 2023-04-23 NOTE — PHYSICAL EXAM
[Alert] : alert [Sclera] : the sclera and conjunctiva were normal [Bowel Sounds] : normal bowel sounds [No Masses] : no abdominal mass palpated [Abdomen Soft] : soft [Epigastric] : in the epigastric area [Abnormal Walk] : normal gait [Normal Affect] : the affect was normal [de-identified] : Mild epigastric tenderness [de-identified] : No jaundice [de-identified] : No confusion

## 2023-04-23 NOTE — ASSESSMENT
[FreeTextEntry1] : Impression:\par \par #1. Chronic pancreatitis / pseudocyst status post endoscopic ultrasound-guided drainage at outside hospital in May 2022 followed by worsened abdominal pain requiring inpatient hospitalization at [facility] later that month. Still with abdominal pain, CT scan pancreas protocol and CT scan abd/pelvis demonstrate stable 3 cm pseudocyst and unchanged peripancreatic stranding with extension into left pericolic gutter, has not yet consulted with outpatient pain management.  Most recent MRI/MRCP in March 2023 demonstrated mild improvement in relatively small collection consistent with walled off necrosis, measuring approximately 2.7 cm in maximal dimension.  Several other smaller collections measuring up to 2 cm.  Of note, inflammatory changes appear to have largely resolved except for small amount of fluid around the pancreatic tail.  Abdominal lymphadenopathy resolved.\par #2. Alcohol induced chronic pancreatitis.\par #3. Alcohol use disorder, last alcohol intake November 2021\par #4. Altered taste sensation, unclear etiology.\par \par Recommendations:\par \par #1.  Given stability and mild decrease in size of walled off necrosis, and overall small size of largest collection, no plan for endoscopic ultrasound-guided drainage.\par \par #2.  We will follow with serial imaging.\par \par #3.  Trial proton pump inhibitor.\par \par #4.  Office follow-up appointment in 3 months.\par \par

## 2023-04-24 ENCOUNTER — APPOINTMENT (OUTPATIENT)
Dept: NEPHROLOGY | Facility: CLINIC | Age: 58
End: 2023-04-24
Payer: MEDICARE

## 2023-04-24 ENCOUNTER — LABORATORY RESULT (OUTPATIENT)
Age: 58
End: 2023-04-24

## 2023-04-24 VITALS
SYSTOLIC BLOOD PRESSURE: 127 MMHG | DIASTOLIC BLOOD PRESSURE: 75 MMHG | WEIGHT: 153 LBS | OXYGEN SATURATION: 100 % | HEIGHT: 71 IN | BODY MASS INDEX: 21.42 KG/M2 | TEMPERATURE: 97.1 F | HEART RATE: 92 BPM

## 2023-04-24 PROCEDURE — 99203 OFFICE O/P NEW LOW 30 MIN: CPT

## 2023-04-24 RX ORDER — TRAMADOL HYDROCHLORIDE AND ACETAMINOPHEN 37.5; 325 MG/1; MG/1
37.5-325 TABLET, FILM COATED ORAL
Qty: 90 | Refills: 0 | Status: ACTIVE | COMMUNITY
Start: 2023-04-05

## 2023-04-24 NOTE — PHYSICAL EXAM
[General Appearance - Alert] : alert [General Appearance - In No Acute Distress] : in no acute distress [General Appearance - Well Nourished] : well nourished [Sclera] : the sclera and conjunctiva were normal [Outer Ear] : the ears and nose were normal in appearance [Neck Appearance] : the appearance of the neck was normal [Neck Cervical Mass (___cm)] : no neck mass was observed [] : no respiratory distress [Exaggerated Use Of Accessory Muscles For Inspiration] : no accessory muscle use [Apical Impulse] : the apical impulse was normal [Heart Rate And Rhythm] : heart rate was normal and rhythm regular [Heart Sounds] : normal S1 and S2 [Edema] : there was no peripheral edema [Bowel Sounds] : normal bowel sounds [Abdomen Soft] : soft [Abdomen Tenderness] : non-tender [Cervical Lymph Nodes Enlarged Posterior Bilaterally] : posterior cervical [Cervical Lymph Nodes Enlarged Anterior Bilaterally] : anterior cervical [Supraclavicular Lymph Nodes Enlarged Bilaterally] : supraclavicular [No CVA Tenderness] : no ~M costovertebral angle tenderness [No Spinal Tenderness] : no spinal tenderness [Abnormal Walk] : normal gait [Musculoskeletal - Swelling] : no joint swelling seen [Skin Color & Pigmentation] : normal skin color and pigmentation [Skin Turgor] : normal skin turgor [Oriented To Time, Place, And Person] : oriented to person, place, and time

## 2023-04-27 LAB
ALBUMIN SERPL ELPH-MCNC: 4.5 G/DL
ANA PAT FLD IF-IMP: ABNORMAL
ANA SER IF-ACNC: ABNORMAL
ANION GAP SERPL CALC-SCNC: 15 MMOL/L
APPEARANCE: CLEAR
BACTERIA: NEGATIVE /HPF
BASOPHILS # BLD AUTO: 0.02 K/UL
BASOPHILS NFR BLD AUTO: 0.3 %
BILIRUBIN URINE: NEGATIVE
BLOOD URINE: NEGATIVE
BUN SERPL-MCNC: 10 MG/DL
C3 SERPL-MCNC: 129 MG/DL
CALCIUM SERPL-MCNC: 10.4 MG/DL
CAST: 0 /LPF
CHLORIDE SERPL-SCNC: 94 MMOL/L
CO2 SERPL-SCNC: 25 MMOL/L
COLOR: YELLOW
CREAT SERPL-MCNC: 0.7 MG/DL
CREAT SPEC-SCNC: 229 MG/DL
CREAT/PROT UR: 0.2 RATIO
DEPRECATED KAPPA LC FREE/LAMBDA SER: 0.87 RATIO
DSDNA AB SER-ACNC: 20 IU/ML
EGFR: 107 ML/MIN/1.73M2
EOSINOPHIL # BLD AUTO: 0.02 K/UL
EOSINOPHIL NFR BLD AUTO: 0.3 %
EPITHELIAL CELLS: 0 /HPF
GLUCOSE QUALITATIVE U: NEGATIVE MG/DL
GLUCOSE SERPL-MCNC: 106 MG/DL
HBV SURFACE AB SER QL: NONREACTIVE
HBV SURFACE AG SER QL: NONREACTIVE
HCT VFR BLD CALC: 43 %
HCV AB SER QL: NONREACTIVE
HCV S/CO RATIO: 0.17 S/CO
HGB BLD-MCNC: 13.7 G/DL
IGA SER QL IEP: 369 MG/DL
IGG SER QL IEP: 1638 MG/DL
IGM SER QL IEP: 112 MG/DL
IMM GRANULOCYTES NFR BLD AUTO: 0.1 %
KAPPA LC CSF-MCNC: 3 MG/DL
KAPPA LC SERPL-MCNC: 2.62 MG/DL
KETONES URINE: ABNORMAL MG/DL
LEUKOCYTE ESTERASE URINE: NEGATIVE
LYMPHOCYTES # BLD AUTO: 1.81 K/UL
LYMPHOCYTES NFR BLD AUTO: 22.9 %
M PROTEIN SPEC IFE-MCNC: NORMAL
MAN DIFF?: NORMAL
MCHC RBC-ENTMCNC: 31.9 GM/DL
MCHC RBC-ENTMCNC: 31.9 PG
MCV RBC AUTO: 100.2 FL
MICROSCOPIC-UA: NORMAL
MONOCYTES # BLD AUTO: 0.37 K/UL
MONOCYTES NFR BLD AUTO: 4.7 %
NEUTROPHILS # BLD AUTO: 5.66 K/UL
NEUTROPHILS NFR BLD AUTO: 71.7 %
NITRITE URINE: NEGATIVE
PH URINE: 6.5
PHOSPHATE SERPL-MCNC: 3.7 MG/DL
PLATELET # BLD AUTO: 319 K/UL
POTASSIUM SERPL-SCNC: 4.7 MMOL/L
PROT UR-MCNC: 47 MG/DL
PROTEIN URINE: 30 MG/DL
RBC # BLD: 4.29 M/UL
RBC # FLD: 14.7 %
RED BLOOD CELLS URINE: 0 /HPF
SODIUM SERPL-SCNC: 135 MMOL/L
SPECIFIC GRAVITY URINE: 1.02
UROBILINOGEN URINE: 0.2 MG/DL
WBC # FLD AUTO: 7.89 K/UL
WHITE BLOOD CELLS URINE: 0 /HPF

## 2023-04-27 RX ORDER — AMLODIPINE BESYLATE 10 MG/1
10 TABLET ORAL DAILY
Qty: 90 | Refills: 1 | Status: DISCONTINUED | COMMUNITY
Start: 2021-01-14 | End: 2023-04-27

## 2023-04-27 NOTE — HISTORY OF PRESENT ILLNESS
[FreeTextEntry1] : A case of hypertension with proteinuria, h/o CVA, necrotizing pancreatitis, portal vein thrombosis, and hypothyroidism has been referred for proteinuria.

## 2023-04-27 NOTE — REVIEW OF SYSTEMS
[Recent Weight Loss (___ Lbs)] : recent [unfilled] ~Ulb weight loss [SOB on Exertion] : shortness of breath during exertion [Dizziness] : dizziness [Eyesight Problems] : no eyesight problems [Loss Of Hearing] : no hearing loss [Chest Pain] : no chest pain [Palpitations] : no palpitations [Lower Ext Edema] : no extremity edema [Constipation] : no constipation [Diarrhea] : no diarrhea [Heartburn] : no heartburn [Nocturia] : no nocturia [Joint Swelling] : no joint swelling [Joint Stiffness] : no joint stiffness [Itching] : no itching [Anxiety] : no anxiety [Fainting] : no fainting [Depression] : no depression [Muscle Weakness] : no muscle weakness [Easy Bleeding] : no tendency for easy bleeding [Easy Bruising] : no tendency for easy bruising [FreeTextEntry9] : backache

## 2023-05-04 ENCOUNTER — APPOINTMENT (OUTPATIENT)
Dept: ULTRASOUND IMAGING | Facility: CLINIC | Age: 58
End: 2023-05-04
Payer: MEDICARE

## 2023-05-04 PROCEDURE — 76775 US EXAM ABDO BACK WALL LIM: CPT

## 2023-05-08 ENCOUNTER — RX RENEWAL (OUTPATIENT)
Age: 58
End: 2023-05-08

## 2023-05-10 NOTE — PATIENT PROFILE ADULT - NSTRANSFERBELONGINGSDISPO_GEN_A_NUR
with patient Partial Purse String (Intermediate) Text: Given the location of the defect and the characteristics of the surrounding skin an intermediate purse string closure was deemed most appropriate.  Undermining was performed circumferentially around the surgical defect.  A purse string suture was then placed and tightened. Wound tension only allowed a partial closure of the circular defect.

## 2023-05-16 ENCOUNTER — APPOINTMENT (OUTPATIENT)
Dept: GASTROENTEROLOGY | Facility: CLINIC | Age: 58
End: 2023-05-16

## 2023-05-16 RX ORDER — OMEPRAZOLE 20 MG/1
20 CAPSULE, DELAYED RELEASE ORAL DAILY
Qty: 1 | Refills: 1 | Status: ACTIVE | COMMUNITY
Start: 2023-05-16 | End: 1900-01-01

## 2023-05-16 RX ORDER — OMEPRAZOLE 40 MG/1
40 CAPSULE, DELAYED RELEASE ORAL
Qty: 90 | Refills: 1 | Status: DISCONTINUED | COMMUNITY
Start: 2023-03-09 | End: 2023-05-16

## 2023-05-22 ENCOUNTER — LABORATORY RESULT (OUTPATIENT)
Age: 58
End: 2023-05-22

## 2023-05-22 ENCOUNTER — APPOINTMENT (OUTPATIENT)
Dept: NEPHROLOGY | Facility: CLINIC | Age: 58
End: 2023-05-22
Payer: MEDICARE

## 2023-05-22 VITALS
BODY MASS INDEX: 20.86 KG/M2 | SYSTOLIC BLOOD PRESSURE: 104 MMHG | HEIGHT: 71 IN | DIASTOLIC BLOOD PRESSURE: 70 MMHG | WEIGHT: 149 LBS | TEMPERATURE: 98 F | HEART RATE: 97 BPM | OXYGEN SATURATION: 98 %

## 2023-05-22 DIAGNOSIS — D64.9 ANEMIA, UNSPECIFIED: ICD-10-CM

## 2023-05-22 PROCEDURE — 99213 OFFICE O/P EST LOW 20 MIN: CPT

## 2023-05-23 LAB
ALBUMIN SERPL ELPH-MCNC: 4.4 G/DL
ALP BLD-CCNC: 89 U/L
ALT SERPL-CCNC: 12 U/L
ANION GAP SERPL CALC-SCNC: 15 MMOL/L
AST SERPL-CCNC: 18 U/L
BILIRUB SERPL-MCNC: 0.3 MG/DL
BUN SERPL-MCNC: 14 MG/DL
CALCIUM SERPL-MCNC: 10.3 MG/DL
CHLORIDE SERPL-SCNC: 98 MMOL/L
CO2 SERPL-SCNC: 25 MMOL/L
CREAT SERPL-MCNC: 0.75 MG/DL
EGFR: 105 ML/MIN/1.73M2
GLUCOSE SERPL-MCNC: 84 MG/DL
POTASSIUM SERPL-SCNC: 4.5 MMOL/L
PROT SERPL-MCNC: 7.7 G/DL
SODIUM SERPL-SCNC: 138 MMOL/L

## 2023-05-23 NOTE — HISTORY OF PRESENT ILLNESS
[FreeTextEntry1] : A case of hypertension with proteinuria, h/o CVA, necrotizing pancreatitis, portal vein thrombosis, and hypothyroidism is being followed for hypertension and proteinuria.

## 2023-05-23 NOTE — ASSESSMENT
[FreeTextEntry1] : A case of hypertension with proteinuria, h/o CVA, necrotizing pancreatitis, portal vein thrombosis, and hypothyroidism is being followed for hypertension and proteinuria.\par Weight is stable. BP is on lower side.\par Advised to decrease the dose of Valsartan to half if BP is less than 110 mm Hg.\par Advised CMP, CBC and urrine microalbumin: creatinine ratio.\par Lab tests evaluated. Renal function normal. Urine protein creatinine ration within normal range.

## 2023-05-23 NOTE — REVIEW OF SYSTEMS
[Recent Weight Loss (___ Lbs)] : recent [unfilled] ~Ulb weight loss [SOB on Exertion] : shortness of breath during exertion [Dizziness] : dizziness [Negative] : ENT [Eyesight Problems] : no eyesight problems [Loss Of Hearing] : no hearing loss [Chest Pain] : no chest pain [Palpitations] : no palpitations [Lower Ext Edema] : no extremity edema [Constipation] : no constipation [Diarrhea] : no diarrhea [Heartburn] : no heartburn [Nocturia] : no nocturia [Joint Swelling] : no joint swelling [Joint Stiffness] : no joint stiffness [Itching] : no itching [Fainting] : no fainting [Anxiety] : no anxiety [Depression] : no depression [Muscle Weakness] : no muscle weakness [Easy Bleeding] : no tendency for easy bleeding [Easy Bruising] : no tendency for easy bruising [FreeTextEntry9] : backache

## 2023-05-25 LAB
BASOPHILS # BLD AUTO: 0.03 K/UL
BASOPHILS NFR BLD AUTO: 0.6 %
EOSINOPHIL # BLD AUTO: 0.18 K/UL
EOSINOPHIL NFR BLD AUTO: 3.6 %
HCT VFR BLD CALC: 39.6 %
HGB BLD-MCNC: 12.5 G/DL
IMM GRANULOCYTES NFR BLD AUTO: 0.2 %
LYMPHOCYTES # BLD AUTO: 1.78 K/UL
LYMPHOCYTES NFR BLD AUTO: 35.3 %
MAN DIFF?: NORMAL
MCHC RBC-ENTMCNC: 31.6 GM/DL
MCHC RBC-ENTMCNC: 33.2 PG
MCV RBC AUTO: 105.3 FL
MONOCYTES # BLD AUTO: 0.42 K/UL
MONOCYTES NFR BLD AUTO: 8.3 %
NEUTROPHILS # BLD AUTO: 2.62 K/UL
NEUTROPHILS NFR BLD AUTO: 52 %
PLATELET # BLD AUTO: 242 K/UL
RBC # BLD: 3.76 M/UL
RBC # FLD: 17 %
WBC # FLD AUTO: 5.04 K/UL

## 2023-06-02 ENCOUNTER — RX RENEWAL (OUTPATIENT)
Age: 58
End: 2023-06-02

## 2023-07-30 NOTE — H&P ADULT - ASSESSMENT
Patient discharged to home with all belongings and paperwork at 1245p. IV removed. VS stable. No distress noted. Discharge teaching provided to patient and wife. Verbalized understanding. Tele removed. CTC aware.    Patient is a 54y old Male with HTN, GERD, Hypothyroidism, PTSD, Alcoholic pancreatitis who presents with progressive abdominal pain found to have new fluid collection in the lesser sac and enlarging fluid collection above the spleen.     Plan:  - Admit to Dr. Duke  - NPO with sips for comfort  - GI consult  - Discussed with Dr. Srikanth Pierson MD  General Surgery, PGY 2

## 2023-08-07 ENCOUNTER — RX RENEWAL (OUTPATIENT)
Age: 58
End: 2023-08-07

## 2023-08-08 ENCOUNTER — RX RENEWAL (OUTPATIENT)
Age: 58
End: 2023-08-08

## 2023-08-22 ENCOUNTER — APPOINTMENT (OUTPATIENT)
Dept: GASTROENTEROLOGY | Facility: CLINIC | Age: 58
End: 2023-08-22

## 2023-09-07 NOTE — ED PROVIDER NOTE - SKIN, MLM
Addended by: Kirsten Lindsey on: 9/7/2023 10:54 AM     Modules accepted: Level of Service
Skin normal color for race, warm, dry and intact. No evidence of rash.

## 2023-12-08 PROBLEM — K21.9 GASTRO-ESOPHAGEAL REFLUX DISEASE WITHOUT ESOPHAGITIS: Status: ACTIVE | Noted: 2022-06-03

## 2023-12-08 PROBLEM — Z00.01 ENCOUNTER FOR WELL ADULT EXAM WITH ABNORMAL FINDINGS: Status: ACTIVE | Noted: 2023-12-08

## 2023-12-08 RX ORDER — CYCLOBENZAPRINE HYDROCHLORIDE 5 MG/1
5 TABLET, FILM COATED ORAL
Qty: 30 | Refills: 0 | Status: DISCONTINUED | COMMUNITY
Start: 2023-04-05 | End: 2023-12-08

## 2023-12-13 ENCOUNTER — LABORATORY RESULT (OUTPATIENT)
Age: 58
End: 2023-12-13

## 2023-12-13 ENCOUNTER — NON-APPOINTMENT (OUTPATIENT)
Age: 58
End: 2023-12-13

## 2023-12-13 ENCOUNTER — APPOINTMENT (OUTPATIENT)
Dept: FAMILY MEDICINE | Facility: CLINIC | Age: 58
End: 2023-12-13
Payer: MEDICARE

## 2023-12-13 VITALS
WEIGHT: 153 LBS | HEIGHT: 71 IN | OXYGEN SATURATION: 98 % | BODY MASS INDEX: 21.42 KG/M2 | SYSTOLIC BLOOD PRESSURE: 136 MMHG | HEART RATE: 65 BPM | DIASTOLIC BLOOD PRESSURE: 80 MMHG | TEMPERATURE: 97.6 F

## 2023-12-13 DIAGNOSIS — Z00.01 ENCOUNTER FOR GENERAL ADULT MEDICAL EXAMINATION WITH ABNORMAL FINDINGS: ICD-10-CM

## 2023-12-13 DIAGNOSIS — K21.9 GASTRO-ESOPHAGEAL REFLUX DISEASE W/OUT ESOPHAGITIS: ICD-10-CM

## 2023-12-13 DIAGNOSIS — R94.31 ABNORMAL ELECTROCARDIOGRAM [ECG] [EKG]: ICD-10-CM

## 2023-12-13 PROCEDURE — 93000 ELECTROCARDIOGRAM COMPLETE: CPT | Mod: 59

## 2023-12-13 PROCEDURE — G0439: CPT

## 2023-12-13 PROCEDURE — 36415 COLL VENOUS BLD VENIPUNCTURE: CPT

## 2023-12-13 NOTE — HEALTH RISK ASSESSMENT
[Patient reported colonoscopy was normal] : Patient reported colonoscopy was normal [Fair] :  ~his/her~ mood as fair [No] : In the past 12 months have you used drugs other than those required for medical reasons? No [0] : 2) Feeling down, depressed, or hopeless: Not at all (0) [PHQ-2 Negative - No further assessment needed] : PHQ-2 Negative - No further assessment needed [Former] : Former [10-14] : 10-14 [> 15 Years] : > 15 Years [ColonoscopyDate] : 2021

## 2023-12-13 NOTE — HISTORY OF PRESENT ILLNESS
[de-identified] : Pt came to office for annual physical exam today.  Pt had EGD recently, was scheduled to have another EGD soon for possible removal of upper GI lesion. Pt needs CBC, comp metabolic panel, PT/INR and PTT blood test today.

## 2023-12-19 LAB
25(OH)D3 SERPL-MCNC: 19.3 NG/ML
ALBUMIN SERPL ELPH-MCNC: 4.5 G/DL
ALP BLD-CCNC: 92 U/L
ALT SERPL-CCNC: 27 U/L
ANION GAP SERPL CALC-SCNC: 11 MMOL/L
APPEARANCE: CLEAR
AST SERPL-CCNC: 46 U/L
BASOPHILS # BLD AUTO: 0.04 K/UL
BASOPHILS NFR BLD AUTO: 0.8 %
BILIRUB SERPL-MCNC: 0.5 MG/DL
BILIRUBIN URINE: NEGATIVE
BLOOD URINE: NEGATIVE
BUN SERPL-MCNC: 11 MG/DL
CALCIUM SERPL-MCNC: 9.8 MG/DL
CHLORIDE SERPL-SCNC: 100 MMOL/L
CHOLEST SERPL-MCNC: 182 MG/DL
CO2 SERPL-SCNC: 29 MMOL/L
COLOR: YELLOW
CREAT SERPL-MCNC: 0.86 MG/DL
CREAT SPEC-SCNC: 114 MG/DL
EGFR: 100 ML/MIN/1.73M2
EOSINOPHIL # BLD AUTO: 0.05 K/UL
EOSINOPHIL NFR BLD AUTO: 1 %
ESTIMATED AVERAGE GLUCOSE: 103 MG/DL
FERRITIN SERPL-MCNC: 70 NG/ML
GLUCOSE QUALITATIVE U: NEGATIVE MG/DL
GLUCOSE SERPL-MCNC: 90 MG/DL
HBA1C MFR BLD HPLC: 5.2 %
HCT VFR BLD CALC: 38 %
HDLC SERPL-MCNC: 73 MG/DL
HGB BLD-MCNC: 12.7 G/DL
IMM GRANULOCYTES NFR BLD AUTO: 0.2 %
INR PPP: 0.98 RATIO
IRON SATN MFR SERPL: 36 %
IRON SERPL-MCNC: 130 UG/DL
KETONES URINE: NEGATIVE MG/DL
LDLC SERPL CALC-MCNC: 91 MG/DL
LEUKOCYTE ESTERASE URINE: NEGATIVE
LYMPHOCYTES # BLD AUTO: 1.82 K/UL
LYMPHOCYTES NFR BLD AUTO: 35.5 %
MAN DIFF?: NORMAL
MCHC RBC-ENTMCNC: 33.4 GM/DL
MCHC RBC-ENTMCNC: 36.4 PG
MCV RBC AUTO: 108.9 FL
MICROALBUMIN 24H UR DL<=1MG/L-MCNC: 4.2 MG/DL
MICROALBUMIN/CREAT 24H UR-RTO: 37 MG/G
MONOCYTES # BLD AUTO: 0.43 K/UL
MONOCYTES NFR BLD AUTO: 8.4 %
NEUTROPHILS # BLD AUTO: 2.77 K/UL
NEUTROPHILS NFR BLD AUTO: 54.1 %
NITRITE URINE: NEGATIVE
NONHDLC SERPL-MCNC: 109 MG/DL
PH URINE: 7.5
PLATELET # BLD AUTO: 318 K/UL
POTASSIUM SERPL-SCNC: 4.7 MMOL/L
PROT SERPL-MCNC: 7.5 G/DL
PROTEIN URINE: NORMAL MG/DL
PT BLD: 11.1 SEC
RBC # BLD: 3.49 M/UL
RBC # FLD: 15.1 %
SODIUM SERPL-SCNC: 140 MMOL/L
SPECIFIC GRAVITY URINE: 1.02
T3 SERPL-MCNC: 59 NG/DL
T3FREE SERPL-MCNC: 1.51 PG/ML
T4 FREE SERPL-MCNC: 0.5 NG/DL
T4 SERPL-MCNC: 3.3 UG/DL
TIBC SERPL-MCNC: 362 UG/DL
TRIGL SERPL-MCNC: 102 MG/DL
TSH SERPL-ACNC: 77.3 UIU/ML
UIBC SERPL-MCNC: 232 UG/DL
URATE SERPL-MCNC: 6.5 MG/DL
UROBILINOGEN URINE: 0.2 MG/DL
WBC # FLD AUTO: 5.12 K/UL

## 2023-12-20 ENCOUNTER — APPOINTMENT (OUTPATIENT)
Dept: FAMILY MEDICINE | Facility: CLINIC | Age: 58
End: 2023-12-20
Payer: COMMERCIAL

## 2023-12-20 DIAGNOSIS — R79.89 OTHER SPECIFIED ABNORMAL FINDINGS OF BLOOD CHEMISTRY: ICD-10-CM

## 2023-12-20 PROCEDURE — 99214 OFFICE O/P EST MOD 30 MIN: CPT

## 2023-12-20 NOTE — HISTORY OF PRESENT ILLNESS
[de-identified] : AST was 46, TSH 77.3, total T3 59, total T4 3.3, free T3 1.51, free T4 0.5, vitamin D 19.3, RBC 3.49, HGB 12.7, HCT 38, .9, RDW 15.1, albumin/creatinine ratio 37. Pt was recalled back for abnormal test reports. Reports were reviewed with pt in details. Other blood tests came back wnl. Pt has been taking all his medications. Pt takes levothyroxine 175 ug, 1 fasting in the morning and ate breakfast in one hour later.

## 2023-12-28 ENCOUNTER — APPOINTMENT (OUTPATIENT)
Dept: CARDIOLOGY | Facility: CLINIC | Age: 58
End: 2023-12-28

## 2024-01-04 ENCOUNTER — APPOINTMENT (OUTPATIENT)
Dept: ENDOCRINOLOGY | Facility: CLINIC | Age: 59
End: 2024-01-04
Payer: COMMERCIAL

## 2024-01-04 VITALS
OXYGEN SATURATION: 100 % | HEIGHT: 71 IN | SYSTOLIC BLOOD PRESSURE: 121 MMHG | BODY MASS INDEX: 21.42 KG/M2 | HEART RATE: 92 BPM | WEIGHT: 153 LBS | TEMPERATURE: 98.2 F | DIASTOLIC BLOOD PRESSURE: 76 MMHG | RESPIRATION RATE: 17 BRPM

## 2024-01-04 PROCEDURE — 99205 OFFICE O/P NEW HI 60 MIN: CPT

## 2024-01-04 PROCEDURE — 99215 OFFICE O/P EST HI 40 MIN: CPT

## 2024-01-04 NOTE — ASSESSMENT
[Levothyroxine] : The patient was instructed to take Levothyroxine on an empty stomach, separate from vitamins, and wait at least 30 minutes before eating [FreeTextEntry1] : Hypothyroidism: s/p BRANHAM for Grave's disease in 2010 CUrrently taking 1.5tablets of 175mcg daily started 12/21/23. Has only been 2 weeks WIll repeat blood work in 2 weeks from today to assess effectiveness Patient with digestive issues, may have absorption issues with levothyroxine.  Patient is following with GI in [place], to get a follow up EGD soon  would need to assess exocrine function if continuing to have digestive and absorption issues. Denies fatty stool or loose bowels but reports discomfort and pain. Will talk about doing stool Fecal elastase-1 at next visit if poor response to increase in levothyroxine. f/u lab work 01/18/24

## 2024-01-04 NOTE — PHYSICAL EXAM
[Alert] : alert [Well Nourished] : well nourished [No Acute Distress] : no acute distress [Well Developed] : well developed [Normal Sclera/Conjunctiva] : normal sclera/conjunctiva [EOMI] : extra ocular movement intact [No Proptosis] : no proptosis [Normal Oropharynx] : the oropharynx was normal [Thyroid Not Enlarged] : the thyroid was not enlarged [No Thyroid Nodules] : no palpable thyroid nodules [No Respiratory Distress] : no respiratory distress [No Accessory Muscle Use] : no accessory muscle use [Clear to Auscultation] : lungs were clear to auscultation bilaterally [Normal S1, S2] : normal S1 and S2 [Normal Rate] : heart rate was normal [Regular Rhythm] : with a regular rhythm [No Edema] : no peripheral edema [Pedal Pulses Normal] : the pedal pulses are present [Normal Bowel Sounds] : normal bowel sounds [Not Tender] : non-tender [Not Distended] : not distended [Soft] : abdomen soft [Normal Anterior Cervical Nodes] : no anterior cervical lymphadenopathy [Normal Posterior Cervical Nodes] : no posterior cervical lymphadenopathy [No Spinal Tenderness] : no spinal tenderness [Spine Straight] : spine straight [No Stigmata of Cushings Syndrome] : no stigmata of Cushings Syndrome [Normal Gait] : normal gait [Normal Strength/Tone] : muscle strength and tone were normal [No Rash] : no rash [No Tremors] : no tremors [Oriented x3] : oriented to person, place, and time [Acanthosis Nigricans] : no acanthosis nigricans [de-identified] : thin middle aged male with cane

## 2024-01-04 NOTE — REVIEW OF SYSTEMS
[Fatigue] : fatigue [Recent Weight Loss (___ Lbs)] : recent weight loss: [unfilled] lbs [Joint Pain] : joint pain [Muscle Weakness] : muscle weakness [Back Pain] : back pain [Negative] : Heme/Lymph
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
## 2024-01-18 ENCOUNTER — APPOINTMENT (OUTPATIENT)
Dept: ENDOCRINOLOGY | Facility: CLINIC | Age: 59
End: 2024-01-18
Payer: COMMERCIAL

## 2024-01-18 PROCEDURE — 36415 COLL VENOUS BLD VENIPUNCTURE: CPT

## 2024-01-19 LAB
T3 SERPL-MCNC: 119 NG/DL
T4 FREE SERPL-MCNC: 1.7 NG/DL
TSH SERPL-ACNC: 5.02 UIU/ML

## 2024-01-30 NOTE — DISCHARGE NOTE NURSING/CASE MANAGEMENT/SOCIAL WORK - NSFLUVACAGEDISCH_IMM_ALL_CORE
Adult General: Well developed; well nourished; in no acute distress    Eyes: PERRL (A), EOM intact; conjunctiva and sclera clear  ENMT: External ear normal, tympanic membranes intact, nasal mucosa normal, no nasal discharge; airway clear, oropharynx clear  Respiratory: No chest wall deformity, normal respiratory pattern, clear to auscultation bilaterally  Cardiovascular: Regular rate and rhythm. S1 and S2 Normal; No murmurs, gallops or rubs  Abdominal: Soft non-tender non-distended; normal bowel sounds; no hepatosplenomegaly; no masses

## 2024-02-02 DIAGNOSIS — Z12.11 ENCOUNTER FOR SCREENING FOR MALIGNANT NEOPLASM OF COLON: ICD-10-CM

## 2024-02-10 ENCOUNTER — RX RENEWAL (OUTPATIENT)
Age: 59
End: 2024-02-10

## 2024-03-18 ENCOUNTER — RX RENEWAL (OUTPATIENT)
Age: 59
End: 2024-03-18

## 2024-04-18 ENCOUNTER — APPOINTMENT (OUTPATIENT)
Dept: ENDOCRINOLOGY | Facility: CLINIC | Age: 59
End: 2024-04-18
Payer: MEDICARE

## 2024-04-18 VITALS
DIASTOLIC BLOOD PRESSURE: 88 MMHG | WEIGHT: 148 LBS | BODY MASS INDEX: 20.72 KG/M2 | RESPIRATION RATE: 17 BRPM | HEIGHT: 71 IN | OXYGEN SATURATION: 99 % | SYSTOLIC BLOOD PRESSURE: 160 MMHG | TEMPERATURE: 98.3 F | HEART RATE: 75 BPM

## 2024-04-18 VITALS — SYSTOLIC BLOOD PRESSURE: 144 MMHG | DIASTOLIC BLOOD PRESSURE: 88 MMHG

## 2024-04-18 DIAGNOSIS — Z87.19 PERSONAL HISTORY OF OTHER DISEASES OF THE DIGESTIVE SYSTEM: ICD-10-CM

## 2024-04-18 DIAGNOSIS — Z86.39 PERSONAL HISTORY OF OTHER ENDOCRINE, NUTRITIONAL AND METABOLIC DISEASE: ICD-10-CM

## 2024-04-18 DIAGNOSIS — Z86.79 PERSONAL HISTORY OF OTHER DISEASES OF THE CIRCULATORY SYSTEM: ICD-10-CM

## 2024-04-18 PROCEDURE — 99214 OFFICE O/P EST MOD 30 MIN: CPT

## 2024-04-18 RX ORDER — LEVOTHYROXINE SODIUM 0.17 MG/1
175 TABLET ORAL
Qty: 135 | Refills: 3 | Status: ACTIVE | COMMUNITY
Start: 2018-11-28 | End: 1900-01-01
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
## 2024-04-18 NOTE — ASSESSMENT
[Levothyroxine] : The patient was instructed to take Levothyroxine on an empty stomach, separate from vitamins, and wait at least 30 minutes before eating [FreeTextEntry1] : Hypothyroidism: s/p BRANHAM for Grave's disease in 2010 Currently taking 1 tablets of 175mcg daily started mid March 2024 as he was running out of medications  Patient with digestive issues, recently had surgery to remove 2 masses from GI tract.  Was responding previously well to 1.5 tabs of 175mcg, will see effectiveness of 175mcg QD.  Will check TFTS and call to adjust dosing if needed

## 2024-04-18 NOTE — REVIEW OF SYSTEMS
[Recent Weight Loss (___ Lbs)] : recent weight loss: [unfilled] lbs [Abdominal Pain] : abdominal pain [Gas/Bloating] : gas/bloating [Muscle Weakness] : muscle weakness [Negative] : Heme/Lymph

## 2024-04-18 NOTE — PHYSICAL EXAM
[Alert] : alert [Well Nourished] : well nourished [No Acute Distress] : no acute distress [Well Developed] : well developed [Normal Sclera/Conjunctiva] : normal sclera/conjunctiva [EOMI] : extra ocular movement intact [No Proptosis] : no proptosis [Normal Oropharynx] : the oropharynx was normal [Thyroid Not Enlarged] : the thyroid was not enlarged [No Thyroid Nodules] : no palpable thyroid nodules [No Respiratory Distress] : no respiratory distress [No Accessory Muscle Use] : no accessory muscle use [Clear to Auscultation] : lungs were clear to auscultation bilaterally [Normal S1, S2] : normal S1 and S2 [Normal Rate] : heart rate was normal [Regular Rhythm] : with a regular rhythm [No Edema] : no peripheral edema [Pedal Pulses Normal] : the pedal pulses are present [Normal Bowel Sounds] : normal bowel sounds [Not Tender] : non-tender [Not Distended] : not distended [Soft] : abdomen soft [Normal Anterior Cervical Nodes] : no anterior cervical lymphadenopathy [No Spinal Tenderness] : no spinal tenderness [Spine Straight] : spine straight [No Stigmata of Cushings Syndrome] : no stigmata of Cushings Syndrome [Normal Gait] : normal gait [Normal Strength/Tone] : muscle strength and tone were normal [No Rash] : no rash [Acanthosis Nigricans] : no acanthosis nigricans [No Tremors] : no tremors [Oriented x3] : oriented to person, place, and time [de-identified] : thin middle aged male with cane

## 2024-04-22 LAB
T3 SERPL-MCNC: 78 NG/DL
T4 FREE SERPL-MCNC: 1.2 NG/DL
TSH SERPL-ACNC: 23 UIU/ML

## 2024-05-03 PROBLEM — I63.411 CEREBROVASCULAR ACCIDENT (CVA) DUE TO EMBOLISM OF RIGHT MIDDLE CEREBRAL ARTERY: Status: ACTIVE | Noted: 2021-04-19

## 2024-05-03 PROBLEM — I10 ESSENTIAL HYPERTENSION: Status: ACTIVE | Noted: 2022-06-03

## 2024-05-03 PROBLEM — K86.0 ALCOHOL-INDUCED CHRONIC PANCREATITIS: Status: ACTIVE | Noted: 2022-07-20

## 2024-05-03 PROBLEM — R80.9 PROTEINURIA: Status: ACTIVE | Noted: 2023-02-15

## 2024-05-09 ENCOUNTER — LABORATORY RESULT (OUTPATIENT)
Age: 59
End: 2024-05-09

## 2024-05-09 ENCOUNTER — APPOINTMENT (OUTPATIENT)
Dept: FAMILY MEDICINE | Facility: CLINIC | Age: 59
End: 2024-05-09
Payer: MEDICARE

## 2024-05-09 VITALS
DIASTOLIC BLOOD PRESSURE: 84 MMHG | HEART RATE: 95 BPM | SYSTOLIC BLOOD PRESSURE: 133 MMHG | TEMPERATURE: 98 F | BODY MASS INDEX: 21 KG/M2 | HEIGHT: 71 IN | OXYGEN SATURATION: 99 % | WEIGHT: 150 LBS

## 2024-05-09 DIAGNOSIS — I10 ESSENTIAL (PRIMARY) HYPERTENSION: ICD-10-CM

## 2024-05-09 DIAGNOSIS — I63.411 CEREBRAL INFARCTION DUE TO EMBOLISM OF RIGHT MIDDLE CEREBRAL ARTERY: ICD-10-CM

## 2024-05-09 DIAGNOSIS — K31.89 OTHER DISEASES OF STOMACH AND DUODENUM: ICD-10-CM

## 2024-05-09 DIAGNOSIS — K86.0 ALCOHOL-INDUCED CHRONIC PANCREATITIS: ICD-10-CM

## 2024-05-09 DIAGNOSIS — R80.9 PROTEINURIA, UNSPECIFIED: ICD-10-CM

## 2024-05-09 PROCEDURE — 36415 COLL VENOUS BLD VENIPUNCTURE: CPT

## 2024-05-09 PROCEDURE — 99214 OFFICE O/P EST MOD 30 MIN: CPT | Mod: 25

## 2024-05-09 NOTE — HISTORY OF PRESENT ILLNESS
[de-identified] : 58 years old male has past medical history of CVA, alcohol induced pancreatitis, anemia, hypothyroidism with Grave"s disease BRANHAM therapy, GERD, hypertension, prediabetes, iron deficiency anemia, proteinuria, came back to office for follow up. Pt was last seen in 12/23, ran out of levothyroxine, restarted in March, 2024. Pt saw endocrinologist, levothyroxine 175 ug, 1 daily restarted. Pt had blood tests for thyroid function in 4/18/24. TSH was 23. Patient with digestive issues, recently had two EGDs because first EGD biopsy positive for malignancy and 2nd negative. Pt had MRI of abdomen and pelvis last week, will see GI today for follow up.

## 2024-05-09 NOTE — HEALTH RISK ASSESSMENT
[Yes] : Yes [No] : In the past 12 months have you used drugs other than those required for medical reasons? No [0] : 2) Feeling down, depressed, or hopeless: Not at all (0) [PHQ-2 Negative - No further assessment needed] : PHQ-2 Negative - No further assessment needed [Former] : Former [20 or more] : 20 or more [> 15 Years] : > 15 Years [de-identified] : socially [[id number]] : 0 [de-identified] : stopped in 30 years ago

## 2024-05-10 LAB
ALBUMIN SERPL ELPH-MCNC: 4.2 G/DL
ALP BLD-CCNC: 98 U/L
ALT SERPL-CCNC: 7 U/L
ANION GAP SERPL CALC-SCNC: 10 MMOL/L
APPEARANCE: CLEAR
AST SERPL-CCNC: 13 U/L
BILIRUB SERPL-MCNC: 0.6 MG/DL
BILIRUBIN URINE: NEGATIVE
BLOOD URINE: NEGATIVE
BUN SERPL-MCNC: 11 MG/DL
CALCIUM SERPL-MCNC: 9.8 MG/DL
CHLORIDE SERPL-SCNC: 102 MMOL/L
CHOLEST SERPL-MCNC: 175 MG/DL
CO2 SERPL-SCNC: 27 MMOL/L
COLOR: ABNORMAL
CREAT SERPL-MCNC: 0.99 MG/DL
EGFR: 88 ML/MIN/1.73M2
ESTIMATED AVERAGE GLUCOSE: 108 MG/DL
GLUCOSE QUALITATIVE U: NEGATIVE MG/DL
GLUCOSE SERPL-MCNC: 112 MG/DL
HBA1C MFR BLD HPLC: 5.4 %
HCT VFR BLD CALC: 34.4 %
HDLC SERPL-MCNC: 58 MG/DL
HGB BLD-MCNC: 10.9 G/DL
IRON SATN MFR SERPL: 30 %
IRON SERPL-MCNC: 95 UG/DL
KETONES URINE: NEGATIVE MG/DL
LDLC SERPL CALC-MCNC: 99 MG/DL
LEUKOCYTE ESTERASE URINE: ABNORMAL
MCHC RBC-ENTMCNC: 31.7 GM/DL
MCHC RBC-ENTMCNC: 34.2 PG
MCV RBC AUTO: 107.8 FL
NITRITE URINE: NEGATIVE
NONHDLC SERPL-MCNC: 117 MG/DL
PH URINE: 6
PLATELET # BLD AUTO: 242 K/UL
POTASSIUM SERPL-SCNC: 5.2 MMOL/L
PROT SERPL-MCNC: 7.2 G/DL
PROTEIN URINE: NEGATIVE MG/DL
RBC # BLD: 3.19 M/UL
RBC # FLD: 16.1 %
SODIUM SERPL-SCNC: 139 MMOL/L
SPECIFIC GRAVITY URINE: 1.01
TIBC SERPL-MCNC: 316 UG/DL
TRIGL SERPL-MCNC: 99 MG/DL
UIBC SERPL-MCNC: 221 UG/DL
URATE SERPL-MCNC: 7 MG/DL
UROBILINOGEN URINE: 0.2 MG/DL
WBC # FLD AUTO: 5.16 K/UL

## 2024-05-13 LAB
25(OH)D3 SERPL-MCNC: 55.6 NG/ML
FERRITIN SERPL-MCNC: 134 NG/ML
T3 SERPL-MCNC: 129 NG/DL
T3FREE SERPL-MCNC: 3.45 PG/ML
T4 FREE SERPL-MCNC: 2.2 NG/DL
T4 SERPL-MCNC: 10.9 UG/DL
TSH SERPL-ACNC: 0.29 UIU/ML

## 2024-05-14 ENCOUNTER — APPOINTMENT (OUTPATIENT)
Dept: FAMILY MEDICINE | Facility: CLINIC | Age: 59
End: 2024-05-14
Payer: MEDICARE

## 2024-05-14 PROCEDURE — 99214 OFFICE O/P EST MOD 30 MIN: CPT

## 2024-05-14 RX ORDER — ERGOCALCIFEROL 1.25 MG/1
1.25 MG CAPSULE, LIQUID FILLED ORAL
Qty: 13 | Refills: 1 | Status: DISCONTINUED | COMMUNITY
Start: 2018-12-04 | End: 2024-05-14

## 2024-05-14 RX ORDER — CHROMIUM 200 MCG
25 MCG TABLET ORAL DAILY
Qty: 90 | Refills: 3 | Status: ACTIVE | COMMUNITY
Start: 2024-05-14 | End: 1900-01-01

## 2024-05-14 RX ORDER — CHLORHEXIDINE GLUCONATE 4 %
325 (65 FE) LIQUID (ML) TOPICAL
Qty: 90 | Refills: 1 | Status: ACTIVE | COMMUNITY
Start: 2023-02-15 | End: 1900-01-01

## 2024-05-14 NOTE — HISTORY OF PRESENT ILLNESS
[de-identified] : 58 years old male has past medical history of CVA, alcohol induced pancreatitis, anemia, hypothyroidism with Grave"s disease BRANHAM therapy, GERD, hypertension, prediabetes, iron deficiency anemia, proteinuria, came back to office for follow up. Pt had blood and urine tests recently, needs to review test results with pcp today. RBC was 3.19, 10.9, HGB 34.4, HCT, RDW 16.1, ferritin, 134, fasting glucose 112, HBA1c 5.4, UA positive for trace leukocyte esterase. Reports were reviewed with pt in details. Other blood tests came back wnl.

## 2024-05-30 PROBLEM — K86.3 PANCREATIC PSEUDOCYST: Status: ACTIVE | Noted: 2022-07-19

## 2024-06-05 ENCOUNTER — APPOINTMENT (OUTPATIENT)
Dept: FAMILY MEDICINE | Facility: CLINIC | Age: 59
End: 2024-06-05
Payer: MEDICARE

## 2024-06-05 VITALS — TEMPERATURE: 98 F | SYSTOLIC BLOOD PRESSURE: 152 MMHG | DIASTOLIC BLOOD PRESSURE: 93 MMHG

## 2024-06-05 DIAGNOSIS — E55.9 VITAMIN D DEFICIENCY, UNSPECIFIED: ICD-10-CM

## 2024-06-05 DIAGNOSIS — R73.01 IMPAIRED FASTING GLUCOSE: ICD-10-CM

## 2024-06-05 DIAGNOSIS — R82.90 UNSPECIFIED ABNORMAL FINDINGS IN URINE: ICD-10-CM

## 2024-06-05 DIAGNOSIS — E03.9 HYPOTHYROIDISM, UNSPECIFIED: ICD-10-CM

## 2024-06-05 DIAGNOSIS — K86.3 PSEUDOCYST OF PANCREAS: ICD-10-CM

## 2024-06-05 DIAGNOSIS — D50.9 IRON DEFICIENCY ANEMIA, UNSPECIFIED: ICD-10-CM

## 2024-06-05 DIAGNOSIS — D75.839 THROMBOCYTOSIS, UNSPECIFIED: ICD-10-CM

## 2024-06-05 PROCEDURE — 99214 OFFICE O/P EST MOD 30 MIN: CPT

## 2024-06-05 RX ORDER — VALSARTAN AND HYDROCHLOROTHIAZIDE 160; 12.5 MG/1; MG/1
160-12.5 TABLET, FILM COATED ORAL
Qty: 90 | Refills: 1 | Status: ACTIVE | COMMUNITY
Start: 2023-04-27 | End: 1900-01-01

## 2024-06-25 NOTE — ED ADULT TRIAGE NOTE - PAIN RATING/NUMBER SCALE (0-10): ACTIVITY
Post-Anesthesia Evaluation and Assessment    Patient: Berenice Curtis MRN: [id number]  SSN: [id number]    YOB: 1979  Age: 45 y.o.  Sex: female      I have evaluated the patient and they are stable and ready for discharge from the PACU.     Cardiovascular Function/Vital Signs  Visit Vitals  BP (!) 102/58   Pulse 69   Temp 98.7 °F (37.1 °C) (Axillary)   Resp 20   Ht 1.676 m (5' 6\")   Wt 73.9 kg (163 lb)   SpO2 93%   BMI 26.31 kg/m²       Patient is status post General anesthesia for Procedure(s):  LEFORT I:  SINGLE PIECE (WITHOUT BONE GRAFT) AND RECONSTRUCTION OF MANDIBULAR RAMI, WITHOUT BONE GRAFT.    Nausea/Vomiting: None    Postoperative hydration reviewed and adequate.    Pain:      Managed    Neurological Status:       At baseline    Mental Status, Level of Consciousness: Alert and  oriented to person, place, and time    Pulmonary Status:       Adequate oxygenation and airway patent    Complications related to anesthesia: None    Post-anesthesia assessment completed. No concerns    Signed By: Carlton Westbrook MD     June 25, 2024            Department of Anesthesiology  Postprocedure Note    Patient: Berenice Curtis  MRN: [id number]  YOB: 1979  Date of evaluation: 6/25/2024    Procedure Summary       Date: 06/25/24 Room / Location: [facility] MAIN OR [address] [place] [address] MAIN OR    Anesthesia Start: 1212 Anesthesia Stop: 1730    Procedure: LEFORT I:  SINGLE PIECE (WITHOUT BONE GRAFT) AND RECONSTRUCTION OF MANDIBULAR RAMI, WITHOUT BONE GRAFT (Mouth) Diagnosis:       Maxillary hypoplasia      Mandibular retrognathism      (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  
10

## 2024-09-22 NOTE — HISTORY OF PRESENT ILLNESS
[de-identified] : 58 years old male with past medical history of CVA, alcohol induced pancreatitis, anemia, hypothyroidism with Grave"s disease BRANHAM therapy, GERD, hypertension, prediabetes, iron deficiency anemia, proteinuria, came back for follow up.

## 2024-09-27 ENCOUNTER — APPOINTMENT (OUTPATIENT)
Dept: FAMILY MEDICINE | Facility: CLINIC | Age: 59
End: 2024-09-27

## 2024-09-27 DIAGNOSIS — D50.9 IRON DEFICIENCY ANEMIA, UNSPECIFIED: ICD-10-CM

## 2024-09-27 DIAGNOSIS — I10 ESSENTIAL (PRIMARY) HYPERTENSION: ICD-10-CM

## 2024-09-27 DIAGNOSIS — K86.3 PSEUDOCYST OF PANCREAS: ICD-10-CM

## 2024-09-27 DIAGNOSIS — R73.01 IMPAIRED FASTING GLUCOSE: ICD-10-CM

## 2024-09-27 DIAGNOSIS — E03.9 HYPOTHYROIDISM, UNSPECIFIED: ICD-10-CM

## 2024-09-27 DIAGNOSIS — E55.9 VITAMIN D DEFICIENCY, UNSPECIFIED: ICD-10-CM

## 2024-10-09 ENCOUNTER — LABORATORY RESULT (OUTPATIENT)
Age: 59
End: 2024-10-09

## 2024-10-09 ENCOUNTER — NON-APPOINTMENT (OUTPATIENT)
Age: 59
End: 2024-10-09

## 2024-10-09 ENCOUNTER — APPOINTMENT (OUTPATIENT)
Dept: FAMILY MEDICINE | Facility: CLINIC | Age: 59
End: 2024-10-09
Payer: MEDICARE

## 2024-10-09 VITALS
TEMPERATURE: 97.6 F | BODY MASS INDEX: 21 KG/M2 | WEIGHT: 150 LBS | HEIGHT: 71 IN | HEART RATE: 74 BPM | SYSTOLIC BLOOD PRESSURE: 149 MMHG | OXYGEN SATURATION: 98 % | DIASTOLIC BLOOD PRESSURE: 86 MMHG

## 2024-10-09 DIAGNOSIS — D50.9 IRON DEFICIENCY ANEMIA, UNSPECIFIED: ICD-10-CM

## 2024-10-09 DIAGNOSIS — E03.9 HYPOTHYROIDISM, UNSPECIFIED: ICD-10-CM

## 2024-10-09 DIAGNOSIS — R73.01 IMPAIRED FASTING GLUCOSE: ICD-10-CM

## 2024-10-09 DIAGNOSIS — E55.9 VITAMIN D DEFICIENCY, UNSPECIFIED: ICD-10-CM

## 2024-10-09 PROCEDURE — G2211 COMPLEX E/M VISIT ADD ON: CPT

## 2024-10-09 PROCEDURE — 36415 COLL VENOUS BLD VENIPUNCTURE: CPT

## 2024-10-09 PROCEDURE — 93000 ELECTROCARDIOGRAM COMPLETE: CPT

## 2024-10-09 PROCEDURE — 99214 OFFICE O/P EST MOD 30 MIN: CPT

## 2024-10-10 LAB
FERRITIN SERPL-MCNC: 129 NG/ML
IRON SATN MFR SERPL: 21 %
IRON SERPL-MCNC: 82 UG/DL
TIBC SERPL-MCNC: 387 UG/DL
UIBC SERPL-MCNC: 305 UG/DL

## 2024-10-11 ENCOUNTER — APPOINTMENT (OUTPATIENT)
Dept: FAMILY MEDICINE | Facility: CLINIC | Age: 59
End: 2024-10-11
Payer: COMMERCIAL

## 2024-10-11 VITALS — SYSTOLIC BLOOD PRESSURE: 137 MMHG | TEMPERATURE: 97.9 F | DIASTOLIC BLOOD PRESSURE: 87 MMHG

## 2024-10-11 DIAGNOSIS — E78.5 HYPERLIPIDEMIA, UNSPECIFIED: ICD-10-CM

## 2024-10-11 DIAGNOSIS — I10 ESSENTIAL (PRIMARY) HYPERTENSION: ICD-10-CM

## 2024-10-11 DIAGNOSIS — D64.9 ANEMIA, UNSPECIFIED: ICD-10-CM

## 2024-10-11 DIAGNOSIS — Z01.818 ENCOUNTER FOR OTHER PREPROCEDURAL EXAMINATION: ICD-10-CM

## 2024-10-11 DIAGNOSIS — R80.9 PROTEINURIA, UNSPECIFIED: ICD-10-CM

## 2024-10-11 PROCEDURE — G2211 COMPLEX E/M VISIT ADD ON: CPT

## 2024-10-11 PROCEDURE — 99214 OFFICE O/P EST MOD 30 MIN: CPT

## 2024-10-17 ENCOUNTER — LABORATORY RESULT (OUTPATIENT)
Age: 59
End: 2024-10-17

## 2024-11-11 ENCOUNTER — RX RENEWAL (OUTPATIENT)
Age: 59
End: 2024-11-11

## 2024-12-03 ENCOUNTER — APPOINTMENT (OUTPATIENT)
Dept: FAMILY MEDICINE | Facility: CLINIC | Age: 59
End: 2024-12-03
Payer: COMMERCIAL

## 2024-12-03 DIAGNOSIS — J06.9 ACUTE UPPER RESPIRATORY INFECTION, UNSPECIFIED: ICD-10-CM

## 2024-12-03 PROCEDURE — 99441: CPT

## 2024-12-03 RX ORDER — BENZONATATE 100 MG/1
100 CAPSULE ORAL 3 TIMES DAILY
Qty: 30 | Refills: 1 | Status: ACTIVE | COMMUNITY
Start: 2024-12-03 | End: 1900-01-01

## 2024-12-03 RX ORDER — AZITHROMYCIN 250 MG/1
250 TABLET, FILM COATED ORAL
Qty: 1 | Refills: 0 | Status: ACTIVE | COMMUNITY
Start: 2024-12-03 | End: 1900-01-01

## 2024-12-05 NOTE — H&P ADULT - GUM GEN PE MLT EXAM PC
Medication:   Name from pharmacy: Atorvastatin Calcium 80 MG Oral Tablet          Will file in chart as: atorvastatin (LIPITOR) 80 MG tablet    Sig: Take 1 tablet by mouth daily.    Original sig: TAKE 1 TABLET BY MOUTH DAILY    Disp: 90 tablet    Refills: 3    Start: 12/4/2024    Class: Eprescribe    For: Elevated coronary artery calcium score, Combined hyperlipidemia    To pharmacy: Please send a replace/new response with 100-Day Supply if appropriate to maximize member benefit. Requesting 1 year supply.    Last ordered: 1 month ago (10/10/2024) by Joon Garibay MD    Last refill: 10/10/2024    Rx #: [id number]   Medication passed protocol.   Last office visit date: 09/19/2024  Next appointment scheduled?: Yes 03/19/2025  Number of refills given: 3  
not examined

## 2025-02-08 ENCOUNTER — LABORATORY RESULT (OUTPATIENT)
Age: 60
End: 2025-02-08

## 2025-02-08 ENCOUNTER — NON-APPOINTMENT (OUTPATIENT)
Age: 60
End: 2025-02-08

## 2025-02-08 ENCOUNTER — APPOINTMENT (OUTPATIENT)
Dept: FAMILY MEDICINE | Facility: CLINIC | Age: 60
End: 2025-02-08
Payer: COMMERCIAL

## 2025-02-08 VITALS
TEMPERATURE: 98.4 F | HEIGHT: 71 IN | HEART RATE: 74 BPM | BODY MASS INDEX: 21.98 KG/M2 | OXYGEN SATURATION: 98 % | WEIGHT: 157 LBS | SYSTOLIC BLOOD PRESSURE: 159 MMHG | DIASTOLIC BLOOD PRESSURE: 90 MMHG

## 2025-02-08 DIAGNOSIS — E78.5 HYPERLIPIDEMIA, UNSPECIFIED: ICD-10-CM

## 2025-02-08 DIAGNOSIS — E03.9 HYPOTHYROIDISM, UNSPECIFIED: ICD-10-CM

## 2025-02-08 DIAGNOSIS — R94.31 ABNORMAL ELECTROCARDIOGRAM [ECG] [EKG]: ICD-10-CM

## 2025-02-08 DIAGNOSIS — E55.9 VITAMIN D DEFICIENCY, UNSPECIFIED: ICD-10-CM

## 2025-02-08 DIAGNOSIS — D50.9 IRON DEFICIENCY ANEMIA, UNSPECIFIED: ICD-10-CM

## 2025-02-08 DIAGNOSIS — R80.9 PROTEINURIA, UNSPECIFIED: ICD-10-CM

## 2025-02-08 DIAGNOSIS — R73.01 IMPAIRED FASTING GLUCOSE: ICD-10-CM

## 2025-02-08 DIAGNOSIS — I10 ESSENTIAL (PRIMARY) HYPERTENSION: ICD-10-CM

## 2025-02-08 DIAGNOSIS — I63.411 CEREBRAL INFARCTION DUE TO EMBOLISM OF RIGHT MIDDLE CEREBRAL ARTERY: ICD-10-CM

## 2025-02-08 PROCEDURE — 36415 COLL VENOUS BLD VENIPUNCTURE: CPT

## 2025-02-08 PROCEDURE — G2211 COMPLEX E/M VISIT ADD ON: CPT

## 2025-02-08 PROCEDURE — 99214 OFFICE O/P EST MOD 30 MIN: CPT

## 2025-02-08 PROCEDURE — 93000 ELECTROCARDIOGRAM COMPLETE: CPT | Mod: 59

## 2025-02-11 LAB
25(OH)D3 SERPL-MCNC: 29.7 NG/ML
ALBUMIN SERPL ELPH-MCNC: 4.6 G/DL
ALP BLD-CCNC: 120 U/L
ALT SERPL-CCNC: 24 U/L
ANION GAP SERPL CALC-SCNC: 11 MMOL/L
APPEARANCE: CLEAR
APTT 2H P 1:4 NP PPP: NORMAL
APTT 2H P INC PPP: NORMAL
APTT IMM NP/PRE NP PPP: NORMAL
APTT INV RATIO PPP: 33.9 SEC
AST SERPL-CCNC: 30 U/L
BASOPHILS # BLD AUTO: 0.08 K/UL
BASOPHILS NFR BLD AUTO: 2.2 %
BILIRUB SERPL-MCNC: 0.4 MG/DL
BILIRUBIN URINE: NEGATIVE
BLOOD URINE: ABNORMAL
BUN SERPL-MCNC: 7 MG/DL
CALCIUM SERPL-MCNC: 10.4 MG/DL
CHLORIDE SERPL-SCNC: 101 MMOL/L
CHOLEST SERPL-MCNC: 212 MG/DL
CO2 SERPL-SCNC: 30 MMOL/L
COLOR: YELLOW
CREAT SERPL-MCNC: 0.93 MG/DL
EGFR: 95 ML/MIN/1.73M2
EOSINOPHIL # BLD AUTO: 0.16 K/UL
EOSINOPHIL NFR BLD AUTO: 4.3 %
ESTIMATED AVERAGE GLUCOSE: 114 MG/DL
FERRITIN SERPL-MCNC: 169 NG/ML
GLUCOSE QUALITATIVE U: NEGATIVE MG/DL
GLUCOSE SERPL-MCNC: 105 MG/DL
HBA1C MFR BLD HPLC: 5.6 %
HCT VFR BLD CALC: 40.1 %
HDLC SERPL-MCNC: 62 MG/DL
HGB BLD-MCNC: 12.6 G/DL
IMM GRANULOCYTES NFR BLD AUTO: 0.3 %
INR PPP: 0.95 RATIO
IRON SATN MFR SERPL: 36 %
IRON SERPL-MCNC: 129 UG/DL
KETONES URINE: NEGATIVE MG/DL
LDLC SERPL CALC-MCNC: 128 MG/DL
LEUKOCYTE ESTERASE URINE: NEGATIVE
LYMPHOCYTES # BLD AUTO: 1.76 K/UL
LYMPHOCYTES NFR BLD AUTO: 47.6 %
MAN DIFF?: NORMAL
MCHC RBC-ENTMCNC: 31.4 G/DL
MCHC RBC-ENTMCNC: 33.7 PG
MCV RBC AUTO: 107.2 FL
MONOCYTES # BLD AUTO: 0.23 K/UL
MONOCYTES NFR BLD AUTO: 6.2 %
NEUTROPHILS # BLD AUTO: 1.46 K/UL
NEUTROPHILS NFR BLD AUTO: 39.4 %
NITRITE URINE: NEGATIVE
NONHDLC SERPL-MCNC: 150 MG/DL
NPP NORMAL POOLED PLASMA: NORMAL SECS
PH URINE: 5.5
PLATELET # BLD AUTO: 275 K/UL
POTASSIUM SERPL-SCNC: 5.2 MMOL/L
PROT SERPL-MCNC: 8.1 G/DL
PROTEIN URINE: NEGATIVE MG/DL
PT BLD: 11.2 SEC
RBC # BLD: 3.74 M/UL
RBC # FLD: 14.6 %
SODIUM SERPL-SCNC: 141 MMOL/L
SPECIFIC GRAVITY URINE: 1.01
T3 SERPL-MCNC: 70 NG/DL
T3FREE SERPL-MCNC: 1.84 PG/ML
T4 FREE SERPL-MCNC: 0.6 NG/DL
T4 SERPL-MCNC: 4.6 UG/DL
TIBC SERPL-MCNC: 363 UG/DL
TRIGL SERPL-MCNC: 123 MG/DL
TSH SERPL-ACNC: 73.9 UIU/ML
UIBC SERPL-MCNC: 234 UG/DL
URATE SERPL-MCNC: 7 MG/DL
UROBILINOGEN URINE: 0.2 MG/DL
WBC # FLD AUTO: 3.7 K/UL

## 2025-02-20 ENCOUNTER — APPOINTMENT (OUTPATIENT)
Dept: FAMILY MEDICINE | Facility: CLINIC | Age: 60
End: 2025-02-20
Payer: MEDICARE

## 2025-02-20 VITALS
DIASTOLIC BLOOD PRESSURE: 65 MMHG | BODY MASS INDEX: 21.56 KG/M2 | TEMPERATURE: 98.5 F | WEIGHT: 154 LBS | HEIGHT: 71 IN | SYSTOLIC BLOOD PRESSURE: 105 MMHG | HEART RATE: 88 BPM | OXYGEN SATURATION: 99 %

## 2025-02-20 DIAGNOSIS — I10 ESSENTIAL (PRIMARY) HYPERTENSION: ICD-10-CM

## 2025-02-20 DIAGNOSIS — Z01.818 ENCOUNTER FOR OTHER PREPROCEDURAL EXAMINATION: ICD-10-CM

## 2025-02-20 DIAGNOSIS — E03.9 HYPOTHYROIDISM, UNSPECIFIED: ICD-10-CM

## 2025-02-20 PROCEDURE — 99214 OFFICE O/P EST MOD 30 MIN: CPT

## 2025-02-20 PROCEDURE — G2211 COMPLEX E/M VISIT ADD ON: CPT

## 2025-02-20 RX ORDER — LEVOTHYROXINE SODIUM 0.2 MG/1
200 TABLET ORAL
Qty: 102 | Refills: 1 | Status: ACTIVE | COMMUNITY
Start: 2025-02-11 | End: 1900-01-01

## 2025-03-26 ENCOUNTER — APPOINTMENT (OUTPATIENT)
Dept: FAMILY MEDICINE | Facility: CLINIC | Age: 60
End: 2025-03-26
Payer: MEDICARE

## 2025-03-26 VITALS — SYSTOLIC BLOOD PRESSURE: 130 MMHG | DIASTOLIC BLOOD PRESSURE: 75 MMHG

## 2025-03-26 VITALS
OXYGEN SATURATION: 98 % | DIASTOLIC BLOOD PRESSURE: 90 MMHG | HEIGHT: 71 IN | SYSTOLIC BLOOD PRESSURE: 153 MMHG | WEIGHT: 157 LBS | HEART RATE: 88 BPM | TEMPERATURE: 97.9 F | BODY MASS INDEX: 21.98 KG/M2

## 2025-03-26 PROCEDURE — G2211 COMPLEX E/M VISIT ADD ON: CPT

## 2025-03-26 PROCEDURE — 99213 OFFICE O/P EST LOW 20 MIN: CPT

## 2025-03-27 LAB
T3 SERPL-MCNC: 65 NG/DL
T3FREE SERPL-MCNC: 1.92 PG/ML
T4 FREE SERPL-MCNC: 0.9 NG/DL
T4 SERPL-MCNC: 5.2 UG/DL
TSH SERPL-ACNC: 25.9 UIU/ML

## 2025-04-02 ENCOUNTER — APPOINTMENT (OUTPATIENT)
Dept: FAMILY MEDICINE | Facility: CLINIC | Age: 60
End: 2025-04-02
Payer: COMMERCIAL

## 2025-04-02 VITALS — DIASTOLIC BLOOD PRESSURE: 77 MMHG | TEMPERATURE: 98.2 F | SYSTOLIC BLOOD PRESSURE: 139 MMHG

## 2025-04-02 DIAGNOSIS — E03.9 HYPOTHYROIDISM, UNSPECIFIED: ICD-10-CM

## 2025-04-02 DIAGNOSIS — I10 ESSENTIAL (PRIMARY) HYPERTENSION: ICD-10-CM

## 2025-04-02 DIAGNOSIS — J06.9 ACUTE UPPER RESPIRATORY INFECTION, UNSPECIFIED: ICD-10-CM

## 2025-04-02 PROCEDURE — 99213 OFFICE O/P EST LOW 20 MIN: CPT

## 2025-04-02 PROCEDURE — G2211 COMPLEX E/M VISIT ADD ON: CPT

## 2025-04-02 RX ORDER — LEVOTHYROXINE SODIUM 0.03 MG/1
25 TABLET ORAL DAILY
Qty: 90 | Refills: 1 | Status: ACTIVE | COMMUNITY
Start: 2025-04-02 | End: 1900-01-01

## 2025-04-14 ENCOUNTER — RX RENEWAL (OUTPATIENT)
Age: 60
End: 2025-04-14

## 2025-04-14 RX ORDER — MULTIVIT-MIN/FOLIC/VIT K/LYCOP 400-300MCG
25 MCG TABLET ORAL
Qty: 90 | Refills: 3 | Status: ACTIVE | COMMUNITY
Start: 2025-04-14 | End: 1900-01-01

## 2025-05-07 ENCOUNTER — APPOINTMENT (OUTPATIENT)
Dept: FAMILY MEDICINE | Facility: CLINIC | Age: 60
End: 2025-05-07
Payer: COMMERCIAL

## 2025-05-07 ENCOUNTER — EMERGENCY (EMERGENCY)
Facility: HOSPITAL | Age: 60
LOS: 1 days | End: 2025-05-07
Payer: COMMERCIAL

## 2025-05-07 VITALS
RESPIRATION RATE: 16 BRPM | TEMPERATURE: 98 F | HEART RATE: 94 BPM | SYSTOLIC BLOOD PRESSURE: 105 MMHG | DIASTOLIC BLOOD PRESSURE: 64 MMHG | OXYGEN SATURATION: 97 % | HEIGHT: 71 IN | WEIGHT: 154.98 LBS

## 2025-05-07 VITALS
WEIGHT: 157 LBS | OXYGEN SATURATION: 100 % | DIASTOLIC BLOOD PRESSURE: 78 MMHG | SYSTOLIC BLOOD PRESSURE: 128 MMHG | HEART RATE: 91 BPM | HEIGHT: 71 IN | BODY MASS INDEX: 21.98 KG/M2 | TEMPERATURE: 97.7 F

## 2025-05-07 DIAGNOSIS — R55 SYNCOPE AND COLLAPSE: ICD-10-CM

## 2025-05-07 DIAGNOSIS — E03.9 HYPOTHYROIDISM, UNSPECIFIED: ICD-10-CM

## 2025-05-07 DIAGNOSIS — Z92.241 PERSONAL HISTORY OF SYSTEMIC STEROID THERAPY: Chronic | ICD-10-CM

## 2025-05-07 DIAGNOSIS — M62.838 OTHER MUSCLE SPASM: ICD-10-CM

## 2025-05-07 LAB
ALBUMIN SERPL ELPH-MCNC: 4.4 G/DL — SIGNIFICANT CHANGE UP (ref 3.3–5)
ALP SERPL-CCNC: 100 U/L — SIGNIFICANT CHANGE UP (ref 40–120)
ALT FLD-CCNC: 20 U/L — SIGNIFICANT CHANGE UP (ref 10–45)
ANION GAP SERPL CALC-SCNC: 15 MMOL/L — SIGNIFICANT CHANGE UP (ref 5–17)
AST SERPL-CCNC: 29 U/L — SIGNIFICANT CHANGE UP (ref 10–40)
BASOPHILS # BLD AUTO: 0.02 K/UL — SIGNIFICANT CHANGE UP (ref 0–0.2)
BASOPHILS NFR BLD AUTO: 0.4 % — SIGNIFICANT CHANGE UP (ref 0–2)
BILIRUB SERPL-MCNC: 0.6 MG/DL — SIGNIFICANT CHANGE UP (ref 0.2–1.2)
BUN SERPL-MCNC: 17 MG/DL — SIGNIFICANT CHANGE UP (ref 7–23)
CALCIUM SERPL-MCNC: 10.2 MG/DL — SIGNIFICANT CHANGE UP (ref 8.4–10.5)
CHLORIDE SERPL-SCNC: 97 MMOL/L — SIGNIFICANT CHANGE UP (ref 96–108)
CO2 SERPL-SCNC: 25 MMOL/L — SIGNIFICANT CHANGE UP (ref 22–31)
CREAT SERPL-MCNC: 0.84 MG/DL — SIGNIFICANT CHANGE UP (ref 0.5–1.3)
EGFR: 100 ML/MIN/1.73M2 — SIGNIFICANT CHANGE UP
EGFR: 100 ML/MIN/1.73M2 — SIGNIFICANT CHANGE UP
EOSINOPHIL # BLD AUTO: 0.07 K/UL — SIGNIFICANT CHANGE UP (ref 0–0.5)
EOSINOPHIL NFR BLD AUTO: 1.5 % — SIGNIFICANT CHANGE UP (ref 0–6)
GLUCOSE SERPL-MCNC: 101 MG/DL — HIGH (ref 70–99)
HCT VFR BLD CALC: 36 % — LOW (ref 39–50)
HGB BLD-MCNC: 12.3 G/DL — LOW (ref 13–17)
IMM GRANULOCYTES NFR BLD AUTO: 0.2 % — SIGNIFICANT CHANGE UP (ref 0–0.9)
LYMPHOCYTES # BLD AUTO: 1.96 K/UL — SIGNIFICANT CHANGE UP (ref 1–3.3)
LYMPHOCYTES # BLD AUTO: 42 % — SIGNIFICANT CHANGE UP (ref 13–44)
MCHC RBC-ENTMCNC: 34.2 G/DL — SIGNIFICANT CHANGE UP (ref 32–36)
MCHC RBC-ENTMCNC: 34.7 PG — HIGH (ref 27–34)
MCV RBC AUTO: 101.7 FL — HIGH (ref 80–100)
MONOCYTES # BLD AUTO: 0.36 K/UL — SIGNIFICANT CHANGE UP (ref 0–0.9)
MONOCYTES NFR BLD AUTO: 7.7 % — SIGNIFICANT CHANGE UP (ref 2–14)
NEUTROPHILS # BLD AUTO: 2.25 K/UL — SIGNIFICANT CHANGE UP (ref 1.8–7.4)
NEUTROPHILS NFR BLD AUTO: 48.2 % — SIGNIFICANT CHANGE UP (ref 43–77)
NRBC BLD AUTO-RTO: 0 /100 WBCS — SIGNIFICANT CHANGE UP (ref 0–0)
PLATELET # BLD AUTO: 179 K/UL — SIGNIFICANT CHANGE UP (ref 150–400)
POTASSIUM SERPL-MCNC: 3.8 MMOL/L — SIGNIFICANT CHANGE UP (ref 3.5–5.3)
POTASSIUM SERPL-SCNC: 3.8 MMOL/L — SIGNIFICANT CHANGE UP (ref 3.5–5.3)
PROT SERPL-MCNC: 7.9 G/DL — SIGNIFICANT CHANGE UP (ref 6–8.3)
RBC # BLD: 3.54 M/UL — LOW (ref 4.2–5.8)
RBC # FLD: 13.6 % — SIGNIFICANT CHANGE UP (ref 10.3–14.5)
SODIUM SERPL-SCNC: 137 MMOL/L — SIGNIFICANT CHANGE UP (ref 135–145)
TROPONIN T, HIGH SENSITIVITY RESULT: <6 NG/L — SIGNIFICANT CHANGE UP (ref 0–51)
WBC # BLD: 4.67 K/UL — SIGNIFICANT CHANGE UP (ref 3.8–10.5)
WBC # FLD AUTO: 4.67 K/UL — SIGNIFICANT CHANGE UP (ref 3.8–10.5)

## 2025-05-07 PROCEDURE — 99223 1ST HOSP IP/OBS HIGH 75: CPT

## 2025-05-07 PROCEDURE — 70498 CT ANGIOGRAPHY NECK: CPT | Mod: 26

## 2025-05-07 PROCEDURE — 70450 CT HEAD/BRAIN W/O DYE: CPT | Mod: 26,59

## 2025-05-07 PROCEDURE — 70496 CT ANGIOGRAPHY HEAD: CPT | Mod: 26

## 2025-05-07 PROCEDURE — 36415 COLL VENOUS BLD VENIPUNCTURE: CPT

## 2025-05-07 PROCEDURE — 99214 OFFICE O/P EST MOD 30 MIN: CPT

## 2025-05-07 PROCEDURE — 72125 CT NECK SPINE W/O DYE: CPT | Mod: 26

## 2025-05-07 RX ORDER — KETOROLAC TROMETHAMINE 30 MG/ML
15 INJECTION, SOLUTION INTRAMUSCULAR; INTRAVENOUS ONCE
Refills: 0 | Status: DISCONTINUED | OUTPATIENT
Start: 2025-05-07 | End: 2025-05-07

## 2025-05-07 RX ORDER — ACETAMINOPHEN 500 MG/5ML
1000 LIQUID (ML) ORAL ONCE
Refills: 0 | Status: COMPLETED | OUTPATIENT
Start: 2025-05-07 | End: 2025-05-08

## 2025-05-07 RX ORDER — KETOROLAC TROMETHAMINE 30 MG/ML
15 INJECTION, SOLUTION INTRAMUSCULAR; INTRAVENOUS ONCE
Refills: 0 | Status: DISCONTINUED | OUTPATIENT
Start: 2025-05-07 | End: 2025-05-08

## 2025-05-07 RX ORDER — ACETAMINOPHEN 500 MG/5ML
1000 LIQUID (ML) ORAL ONCE
Refills: 0 | Status: COMPLETED | OUTPATIENT
Start: 2025-05-07 | End: 2025-05-07

## 2025-05-07 RX ADMIN — KETOROLAC TROMETHAMINE 15 MILLIGRAM(S): 30 INJECTION, SOLUTION INTRAMUSCULAR; INTRAVENOUS at 15:25

## 2025-05-07 RX ADMIN — Medication 400 MILLIGRAM(S): at 15:25

## 2025-05-08 VITALS
SYSTOLIC BLOOD PRESSURE: 133 MMHG | DIASTOLIC BLOOD PRESSURE: 82 MMHG | OXYGEN SATURATION: 98 % | TEMPERATURE: 98 F | RESPIRATION RATE: 18 BRPM | HEART RATE: 66 BPM

## 2025-05-08 DIAGNOSIS — R55 SYNCOPE AND COLLAPSE: ICD-10-CM

## 2025-05-08 DIAGNOSIS — I10 ESSENTIAL (PRIMARY) HYPERTENSION: ICD-10-CM

## 2025-05-08 DIAGNOSIS — E03.9 HYPOTHYROIDISM, UNSPECIFIED: ICD-10-CM

## 2025-05-08 LAB
A1C WITH ESTIMATED AVERAGE GLUCOSE RESULT: 5.2 % — SIGNIFICANT CHANGE UP (ref 4–5.6)
CHOLEST SERPL-MCNC: 178 MG/DL — SIGNIFICANT CHANGE UP
ESTIMATED AVERAGE GLUCOSE: 103 MG/DL — SIGNIFICANT CHANGE UP (ref 68–114)
FOLATE SERPL-MCNC: 10.8 NG/ML — SIGNIFICANT CHANGE UP
HDLC SERPL-MCNC: 55 MG/DL — SIGNIFICANT CHANGE UP
LDLC SERPL-MCNC: 104 MG/DL — HIGH
LIPID PNL WITH DIRECT LDL SERPL: 104 MG/DL — HIGH
NONHDLC SERPL-MCNC: 123 MG/DL — SIGNIFICANT CHANGE UP
T3 SERPL-MCNC: 95 NG/DL
T3FREE SERPL-MCNC: 2.77 PG/ML
T4 FREE SERPL-MCNC: 2.2 NG/DL
T4 SERPL-MCNC: 13.1 UG/DL
TRIGL SERPL-MCNC: 102 MG/DL — SIGNIFICANT CHANGE UP
TSH SERPL-ACNC: 3.99 UIU/ML
TSH SERPL-MCNC: 2.08 UIU/ML — SIGNIFICANT CHANGE UP (ref 0.27–4.2)
VIT B12 SERPL-MCNC: 401 PG/ML — SIGNIFICANT CHANGE UP (ref 232–1245)

## 2025-05-08 PROCEDURE — 70548 MR ANGIOGRAPHY NECK W/DYE: CPT

## 2025-05-08 PROCEDURE — 80053 COMPREHEN METABOLIC PANEL: CPT

## 2025-05-08 PROCEDURE — 99239 HOSP IP/OBS DSCHRG MGMT >30: CPT

## 2025-05-08 PROCEDURE — 82607 VITAMIN B-12: CPT

## 2025-05-08 PROCEDURE — 82746 ASSAY OF FOLIC ACID SERUM: CPT

## 2025-05-08 PROCEDURE — 80061 LIPID PANEL: CPT

## 2025-05-08 PROCEDURE — 96376 TX/PRO/DX INJ SAME DRUG ADON: CPT

## 2025-05-08 PROCEDURE — 70553 MRI BRAIN STEM W/O & W/DYE: CPT | Mod: 26

## 2025-05-08 PROCEDURE — 85025 COMPLETE CBC W/AUTO DIFF WBC: CPT

## 2025-05-08 PROCEDURE — 72156 MRI NECK SPINE W/O & W/DYE: CPT

## 2025-05-08 PROCEDURE — 84484 ASSAY OF TROPONIN QUANT: CPT

## 2025-05-08 PROCEDURE — G0378: CPT

## 2025-05-08 PROCEDURE — 70548 MR ANGIOGRAPHY NECK W/DYE: CPT | Mod: 26

## 2025-05-08 PROCEDURE — 70544 MR ANGIOGRAPHY HEAD W/O DYE: CPT | Mod: 26,59

## 2025-05-08 PROCEDURE — 70544 MR ANGIOGRAPHY HEAD W/O DYE: CPT

## 2025-05-08 PROCEDURE — 83036 HEMOGLOBIN GLYCOSYLATED A1C: CPT

## 2025-05-08 PROCEDURE — 96375 TX/PRO/DX INJ NEW DRUG ADDON: CPT | Mod: XU

## 2025-05-08 PROCEDURE — 93285 PRGRMG DEV EVAL SCRMS IP: CPT | Mod: 26

## 2025-05-08 PROCEDURE — 70498 CT ANGIOGRAPHY NECK: CPT

## 2025-05-08 PROCEDURE — 70553 MRI BRAIN STEM W/O & W/DYE: CPT

## 2025-05-08 PROCEDURE — 72156 MRI NECK SPINE W/O & W/DYE: CPT | Mod: 26

## 2025-05-08 PROCEDURE — A9585: CPT

## 2025-05-08 PROCEDURE — 70450 CT HEAD/BRAIN W/O DYE: CPT

## 2025-05-08 PROCEDURE — 84443 ASSAY THYROID STIM HORMONE: CPT

## 2025-05-08 PROCEDURE — 96374 THER/PROPH/DIAG INJ IV PUSH: CPT | Mod: XU

## 2025-05-08 PROCEDURE — 99284 EMERGENCY DEPT VISIT MOD MDM: CPT | Mod: 25

## 2025-05-08 PROCEDURE — 72125 CT NECK SPINE W/O DYE: CPT

## 2025-05-08 PROCEDURE — 70496 CT ANGIOGRAPHY HEAD: CPT

## 2025-05-08 RX ORDER — ACETAMINOPHEN 500 MG/5ML
1000 LIQUID (ML) ORAL ONCE
Refills: 0 | Status: COMPLETED | OUTPATIENT
Start: 2025-05-08 | End: 2025-05-08

## 2025-05-08 RX ORDER — LEVOTHYROXINE SODIUM 300 MCG
200 TABLET ORAL DAILY
Refills: 0 | Status: DISCONTINUED | OUTPATIENT
Start: 2025-05-08 | End: 2025-05-11

## 2025-05-08 RX ORDER — ATORVASTATIN CALCIUM 80 MG/1
1 TABLET, FILM COATED ORAL
Qty: 30 | Refills: 0
Start: 2025-05-08 | End: 2025-06-06

## 2025-05-08 RX ADMIN — Medication 1000 MILLILITER(S): at 08:45

## 2025-05-08 RX ADMIN — Medication 400 MILLIGRAM(S): at 00:57

## 2025-05-08 RX ADMIN — Medication 400 MILLIGRAM(S): at 15:48

## 2025-05-08 RX ADMIN — Medication 200 MICROGRAM(S): at 05:07

## 2025-05-08 RX ADMIN — KETOROLAC TROMETHAMINE 15 MILLIGRAM(S): 30 INJECTION, SOLUTION INTRAMUSCULAR; INTRAVENOUS at 00:58

## (undated) DEVICE — BLADE SURGICAL #15 CARBON

## (undated) DEVICE — PACK GENERAL LAPAROSCOPY

## (undated) DEVICE — ELCTR GROUNDING PAD ADULT COVIDIEN

## (undated) DEVICE — D HELP - CLEARVIEW CLEARIFY SYSTEM

## (undated) DEVICE — DRSG STERISTRIPS 0.25 X 3"

## (undated) DEVICE — SUT VICRYL 3-0 27" SH UNDYED

## (undated) DEVICE — SUT MONOCRYL 4-0 27" PS-2 UNDYED

## (undated) DEVICE — TUBING STRYKEFLOW II SUCTION / IRRIGATOR

## (undated) DEVICE — SHEARS COVIDIEN ENDO SHEAR 5MM X 31CM W UNIPOLAR CAUTERY

## (undated) DEVICE — SUT VICRYL 0 27" UR-6

## (undated) DEVICE — VENODYNE/SCD SLEEVE CALF MEDIUM

## (undated) DEVICE — GLV 7.5 PROTEXIS (WHITE)

## (undated) DEVICE — CANISTER DISPOSABLE THIN WALL 3000CC

## (undated) DEVICE — SOL IRR POUR NS 0.9% 500ML

## (undated) DEVICE — WARMING BLANKET FULL UNDERBODY

## (undated) DEVICE — SOL INJ NS 0.9% 1000ML